# Patient Record
Sex: FEMALE | Race: WHITE | NOT HISPANIC OR LATINO | Employment: OTHER | URBAN - METROPOLITAN AREA
[De-identification: names, ages, dates, MRNs, and addresses within clinical notes are randomized per-mention and may not be internally consistent; named-entity substitution may affect disease eponyms.]

---

## 2017-04-18 ENCOUNTER — OFFICE VISIT (OUTPATIENT)
Dept: URGENT CARE | Facility: CLINIC | Age: 72
End: 2017-04-18
Payer: COMMERCIAL

## 2017-04-18 PROCEDURE — G0463 HOSPITAL OUTPT CLINIC VISIT: HCPCS

## 2017-04-18 PROCEDURE — 99213 OFFICE O/P EST LOW 20 MIN: CPT

## 2019-02-19 LAB — HBA1C MFR BLD HPLC: 5.4 %

## 2019-06-02 DIAGNOSIS — K21.9 GASTROESOPHAGEAL REFLUX DISEASE, ESOPHAGITIS PRESENCE NOT SPECIFIED: Primary | ICD-10-CM

## 2019-06-03 RX ORDER — PANTOPRAZOLE SODIUM 40 MG/1
TABLET, DELAYED RELEASE ORAL
Qty: 90 TABLET | Refills: 1 | Status: SHIPPED | OUTPATIENT
Start: 2019-06-03 | End: 2019-11-24 | Stop reason: SDUPTHER

## 2019-06-27 ENCOUNTER — TELEPHONE (OUTPATIENT)
Dept: GASTROENTEROLOGY | Facility: CLINIC | Age: 74
End: 2019-06-27

## 2019-07-01 ENCOUNTER — OFFICE VISIT (OUTPATIENT)
Dept: GASTROENTEROLOGY | Facility: CLINIC | Age: 74
End: 2019-07-01
Payer: COMMERCIAL

## 2019-07-01 ENCOUNTER — TELEPHONE (OUTPATIENT)
Dept: GASTROENTEROLOGY | Facility: CLINIC | Age: 74
End: 2019-07-01

## 2019-07-01 VITALS
WEIGHT: 132 LBS | BODY MASS INDEX: 24.29 KG/M2 | HEART RATE: 64 BPM | HEIGHT: 62 IN | DIASTOLIC BLOOD PRESSURE: 72 MMHG | SYSTOLIC BLOOD PRESSURE: 140 MMHG

## 2019-07-01 DIAGNOSIS — R10.10 PAIN OF UPPER ABDOMEN: Primary | ICD-10-CM

## 2019-07-01 DIAGNOSIS — Z96.641 HISTORY OF TOTAL RIGHT HIP REPLACEMENT: ICD-10-CM

## 2019-07-01 DIAGNOSIS — K22.70 BARRETT'S ESOPHAGUS WITHOUT DYSPLASIA: ICD-10-CM

## 2019-07-01 DIAGNOSIS — K59.00 CONSTIPATION, UNSPECIFIED CONSTIPATION TYPE: ICD-10-CM

## 2019-07-01 PROBLEM — E78.00 PURE HYPERCHOLESTEROLEMIA: Status: ACTIVE | Noted: 2019-07-01

## 2019-07-01 PROCEDURE — 99214 OFFICE O/P EST MOD 30 MIN: CPT | Performed by: INTERNAL MEDICINE

## 2019-07-01 RX ORDER — PRAVASTATIN SODIUM 40 MG
40 TABLET ORAL DAILY
Refills: 2 | COMMUNITY
Start: 2019-06-06

## 2019-07-01 RX ORDER — FOSINOPRIL SODIUM 10 MG/1
10 TABLET ORAL 2 TIMES DAILY
Refills: 3 | COMMUNITY
Start: 2019-06-08

## 2019-07-01 NOTE — LETTER
July 1, 2019     Mary Elliott MD  [address] [facility]  [address]  [facility] [address]    Patient: Lainey Fairbanks   YOB: 1945   Date of Visit: 7/1/2019       Dear Dr Kaden Jaramillo:    Thank you for referring Cherie Angry to me for evaluation  Below are my notes for this consultation  If you have questions, please do not hesitate to call me  I look forward to following your patient along with you  Sincerely,        Karla Wayne MD        CC: No Recipients  Karla Wayne MD  7/1/2019  6:00 PM  Incomplete  [facility] - Outpatient Follow-up Note  Lainey Fairbanks 74 y o  female MRN: [id number]  Encounter: [id number]    ASSESSMENT AND PLAN:      1  Pain of upper abdomen    --pain seems to be positional   She has noticed it after her surgery on June 20th  On physical exam patient seems to have tenderness over her ribs  I suspect this is related to a musculoskeletal type issue  Patient is not short of breath and does not have increasing pain with deep breathing  No clear relation to eating she is not sure if a bowel movement will help the pain  -pain is worse with sitting  No pain with standing or lying flat  Suspect pain is musculoskeletal in origin    - XR abdomen obstruction series; Future  - Comprehensive metabolic panel; Future  - CBC and differential; Future  -Amylase and Lipase     -if no improvement in 1-2 weeks consider CT scan exam  -pain worsens would obtain CT scan      2  Constipation, unspecified constipation type    Has been a chronic problem  Try Metamucil 1 tbsp daily or MiraLax as needed  Obstruction series should show if there is lot of stool in the colon    3  Edmondson's esophagus without dysplasia    -has been stable  Last endoscopy was 2018 without dysplasia    4   History of total right hip replacement    -patient had surgery June 20th [facility] [place]   -stable at this time  -patient's symptoms probably have something to do with the patient's surgery either change of her position during this procedure, less likely a bowel issue  Followup Appointment:  ISAEL r n   ______________________________________________________________________    Chief Complaint   Patient presents with    Constipation     Abd pain; changes with position; loose stools     HPI:  The patient comes to the office with complaints of sub costal abdominal pain  She has had surgery on June 20th at the [facility]  She had a right total hip replacement  She was discharged the next day from the hospital and did well but does complain of pain under both rib cages  She has this particularly with being in the sitting position  If she is in a standing position or walking she does not have pain  The pain is not aggravated by eating  She did have some problems with constipation as she had been on tramadol for pain  She has been off the tramadol now  She took various laxatives under their direction and had loose stool  She typically does not move her bowels all that frequently and has a bowel movement about twice per week  She does not have particular relief with having a bowel movement although she thought she might  Appetite is good and she has no nausea  Patient did have a colonoscopy as recently as 2017    Historical Information   No past medical history on file  No past surgical history on file  Social History     Substance and Sexual Activity   Alcohol Use Not on file     Social History     Substance and Sexual Activity   Drug Use Not on file     Social History     Tobacco Use   Smoking Status Not on file     Family History   Problem Relation Age of Onset    Colon polyps Sister          Current Outpatient Medications:     fosinopril (MONOPRIL) 10 mg tablet    pantoprazole (PROTONIX) 40 mg tablet    pravastatin (PRAVACHOL) 40 mg tablet  No Known Allergies    10 Point REVIEW OF SYSTEMS   Negative except for under musculoskeletal some hip pain    General/psych difficulty sleeping mostly related to current problem    PHYSICAL EXAM:    Blood pressure 140/72, pulse 64, height 5' 2" (1 575 m), weight 59 9 kg (132 lb)  Body mass index is 24 14 kg/m²  General Appearance:  Alert, cooperative, no distress  HEENT:  Normocephalic, atraumatic, anicteric  Neck: Supple, symmetrical, trachea midline  Lungs: Clear to auscultation bilaterally; no rales, rhonchi or wheezing; respirations unlabored   Heart: Regular rate and rhythm; no murmur, rub, or gallop  Abdomen:   Soft, non-tender, non-distended; normal bowel sounds; no masses, no organomegaly   Musculoskeletal patient does have some tenderness on palpation of both rib cages  Rectal:  Deferred   Extremities:  No cyanosis, clubbing or edema   Skin:  No jaundice, rashes, or lesions   Lymph nodes: No palpable cervical lymphadenopathy         Lebron Dunbar MD  7/1/2019  5:01 PM  Sign at close encounter  [facility] - Outpatient Follow-up Note  Lana Beatty 74 y o  female MRN: [id number]  Encounter: [id number]    ASSESSMENT AND PLAN:      1  Pain of upper abdomen  --pain seems to be positional   She has noticed it after her surgery on June 20th  On physical exam patient seems to have tenderness over her ribs  I suspect this is related to a musculoskeletal type issue  Patient is not short of breath and does not have increasing pain with deep breathing  No clear relation to eating she is not sure if a bowel movement will help the pain  -pain is worse with sitting  No pain with standing or lying flat    - XR abdomen obstruction series; Future  - Comprehensive metabolic panel; Future  - CBC and differential; Future  -Amylase and Lipase     -if no improvement in 1-2 weeks consider CT scan exam  -pain worsens would obtain CT scan      2   Constipation, unspecified constipation type    Has been a chronic problem  Try Metamucil 1 tbsp daily or MiraLax as needed  Obstruction series should show if there is lot of stool in the colon    3  Edmondson's esophagus without dysplasia    -has been stable  Last endoscopy was 2018 without dysplasia    4  History of total right hip replacement    -patient had surgery June [facility]th [facility] [place]   -stable at this time  -patient's symptoms probably have something to do with the patient's surgery either change of her position during this procedure, less likely a bowel issue  Followup Appointment:  ISAEL espinosa n   ______________________________________________________________________    Chief Complaint   Patient presents with    Constipation     Abd pain; changes with position; loose stools     HPI:  The patient comes to the office with complaints of sub costal abdominal pain  She has had surgery on June 20th at the [facility]  She had a right total hip replacement  She was discharged the next day from the hospital and did well but does complain of pain under both rib cages  She has this particularly with being in the sitting position  If she is in a standing position or walking she does not have pain  The pain is not aggravated by eating  She did have some problems with constipation as she had been on tramadol for pain  She has been off the tramadol now  She took various laxatives under their direction and had loose stool  She typically does not move her bowels all that frequently and has a bowel movement about twice per week  She does not have particular relief with having a bowel movement although she thought she might  Appetite is good and she has no nausea  Patient did have a colonoscopy as recently as 2017    Historical Information   No past medical history on file  No past surgical history on file    Social History     Substance and Sexual Activity   Alcohol Use Not on file     Social History     Substance and Sexual Activity   Drug Use Not on file     Social History     Tobacco Use   Smoking Status Not on file     Family History   Problem Relation Age of Onset    Colon polyps Sister          Current Outpatient Medications:     fosinopril (MONOPRIL) 10 mg tablet    pantoprazole (PROTONIX) 40 mg tablet    pravastatin (PRAVACHOL) 40 mg tablet  No Known Allergies    10 Point REVIEW OF SYSTEMS   Negative except for under musculoskeletal some hip pain    General/psych difficulty sleeping mostly related to current problem    PHYSICAL EXAM:    Blood pressure 140/72, pulse 64, height 5' 2" (1 575 m), weight 59 9 kg (132 lb)  Body mass index is 24 14 kg/m²  General Appearance:  Alert, cooperative, no distress  HEENT:  Normocephalic, atraumatic, anicteric  Neck: Supple, symmetrical, trachea midline  Lungs: Clear to auscultation bilaterally; no rales, rhonchi or wheezing; respirations unlabored   Heart: Regular rate and rhythm; no murmur, rub, or gallop    Abdomen:   Soft, non-tender, non-distended; normal bowel sounds; no masses, no organomegaly   Musculoskeletal patient does have some tenderness on palpation of both rib cages  Rectal:  Deferred   Extremities:  No cyanosis, clubbing or edema   Skin:  No jaundice, rashes, or lesions   Lymph nodes: No palpable cervical lymphadenopathy

## 2019-07-01 NOTE — PROGRESS NOTES
[facility] - Outpatient Follow-up Note  Ryan Rodrigez 74 y o  female MRN: [id number]  Encounter: [id number]    ASSESSMENT AND PLAN:      1  Pain of upper abdomen    --pain seems to be positional   She has noticed it after her surgery on June 20th  On physical exam patient seems to have tenderness over her ribs  I suspect this is related to a musculoskeletal type issue  Patient is not short of breath and does not have increasing pain with deep breathing  No clear relation to eating she is not sure if a bowel movement will help the pain  -pain is worse with sitting  No pain with standing or lying flat  Suspect pain is musculoskeletal in origin    - XR abdomen obstruction series; Future  - Comprehensive metabolic panel; Future  - CBC and differential; Future  -Amylase and Lipase     -if no improvement in 1-2 weeks consider CT scan exam  -pain worsens would obtain CT scan      2  Constipation, unspecified constipation type    Has been a chronic problem  Try Metamucil 1 tbsp daily or MiraLax as needed  Obstruction series should show if there is lot of stool in the colon    3  Edmondson's esophagus without dysplasia    -has been stable  Last endoscopy was 2018 without dysplasia    4  History of total right hip replacement    -patient had surgery June 20th [facility] [place]   -stable at this time  -patient's symptoms probably have something to do with the patient's surgery either change of her position during this procedure, less likely a bowel issue  Followup Appointment:  ISAEL ohara   ______________________________________________________________________    Chief Complaint   Patient presents with    Constipation     Abd pain; changes with position; loose stools     HPI:  The patient comes to the office with complaints of sub costal abdominal pain  She has had surgery on June 20th at the [facility]  She had a right total hip replacement    She was discharged the next day from the hospital and did well but does complain of pain under both rib cages  She has this particularly with being in the sitting position  If she is in a standing position or walking she does not have pain  The pain is not aggravated by eating  She did have some problems with constipation as she had been on tramadol for pain  She has been off the tramadol now  She took various laxatives under their direction and had loose stool  She typically does not move her bowels all that frequently and has a bowel movement about twice per week  She does not have particular relief with having a bowel movement although she thought she might  Appetite is good and she has no nausea  Patient did have a colonoscopy as recently as 2017    Historical Information   No past medical history on file  No past surgical history on file  Social History     Substance and Sexual Activity   Alcohol Use Not on file     Social History     Substance and Sexual Activity   Drug Use Not on file     Social History     Tobacco Use   Smoking Status Not on file     Family History   Problem Relation Age of Onset    Colon polyps Sister          Current Outpatient Medications:     fosinopril (MONOPRIL) 10 mg tablet    pantoprazole (PROTONIX) 40 mg tablet    pravastatin (PRAVACHOL) 40 mg tablet  No Known Allergies    10 Point REVIEW OF SYSTEMS   Negative except for under musculoskeletal some hip pain    General/psych difficulty sleeping mostly related to current problem    PHYSICAL EXAM:    Blood pressure 140/72, pulse 64, height 5' 2" (1 575 m), weight 59 9 kg (132 lb)  Body mass index is 24 14 kg/m²  General Appearance:  Alert, cooperative, no distress  HEENT:  Normocephalic, atraumatic, anicteric  Neck: Supple, symmetrical, trachea midline  Lungs: Clear to auscultation bilaterally; no rales, rhonchi or wheezing; respirations unlabored   Heart: Regular rate and rhythm; no murmur, rub, or gallop    Abdomen:   Soft, non-tender, non-distended; normal bowel sounds; no masses, no organomegaly   Musculoskeletal patient does have some tenderness on palpation of both rib cages  Rectal:  Deferred   Extremities:  No cyanosis, clubbing or edema   Skin:  No jaundice, rashes, or lesions   Lymph nodes: No palpable cervical lymphadenopathy

## 2019-07-01 NOTE — PATIENT INSTRUCTIONS
[facility] - Outpatient Follow-up Note  Michelle Liu 74 y o  female MRN: [id number]  Encounter: [id number]    ASSESSMENT AND PLAN:      1  Pain of upper abdomen  --pain seems to be positional   She has noticed it after her surgery on June 20th  On physical exam patient seems to have tenderness over her ribs  I suspect this is related to a musculoskeletal type issue  Patient is not short of breath and does not have increasing pain with deep breathing  No clear relation to eating she is not sure if a bowel movement will help the pain  -pain is worse with sitting  No pain with standing or lying flat    - XR abdomen obstruction series; Future  - Comprehensive metabolic panel; Future  - CBC and differential; Future  -Amylase and Lipase     -if no improvement in 1-2 weeks consider CT scan exam  -pain worsens would obtain CT scan      2  Constipation, unspecified constipation type    Has been a chronic problem  Try Metamucil 1 tbsp daily or MiraLax as needed  Obstruction series should show if there is lot of stool in the colon    3  Edmondson's esophagus without dysplasia    -has been stable  Last endoscopy was 2018 without dysplasia    4  History of total right hip replacement    -patient had surgery June [facility]th [facility] [place]   -stable at this time  -patient's symptoms probably have something to do with the patient's surgery either change of her position during this procedure, less likely a bowel issue  Followup Appointment:  ISAEL ohara

## 2019-07-12 LAB
ALBUMIN SERPL-MCNC: 4.3 G/DL (ref 3.5–4.8)
ALBUMIN/GLOB SERPL: 2 {RATIO} (ref 1.2–2.2)
ALP SERPL-CCNC: 93 IU/L (ref 39–117)
ALT SERPL-CCNC: 11 IU/L (ref 0–32)
AST SERPL-CCNC: 16 IU/L (ref 0–40)
BASOPHILS # BLD AUTO: 0 X10E3/UL (ref 0–0.2)
BASOPHILS NFR BLD AUTO: 0 %
BILIRUB SERPL-MCNC: 0.4 MG/DL (ref 0–1.2)
BUN SERPL-MCNC: 17 MG/DL (ref 8–27)
BUN/CREAT SERPL: 22 (ref 12–28)
CALCIUM SERPL-MCNC: 9.7 MG/DL (ref 8.7–10.3)
CHLORIDE SERPL-SCNC: 101 MMOL/L (ref 96–106)
CO2 SERPL-SCNC: 23 MMOL/L (ref 20–29)
CREAT SERPL-MCNC: 0.76 MG/DL (ref 0.57–1)
EOSINOPHIL # BLD AUTO: 0.2 X10E3/UL (ref 0–0.4)
EOSINOPHIL NFR BLD AUTO: 4 %
ERYTHROCYTE [DISTWIDTH] IN BLOOD BY AUTOMATED COUNT: 15.8 % (ref 12.3–15.4)
GLOBULIN SER-MCNC: 2.2 G/DL (ref 1.5–4.5)
GLUCOSE SERPL-MCNC: 89 MG/DL (ref 65–99)
HCT VFR BLD AUTO: 36.7 % (ref 34–46.6)
HGB BLD-MCNC: 11.7 G/DL (ref 11.1–15.9)
IMM GRANULOCYTES # BLD: 0 X10E3/UL (ref 0–0.1)
IMM GRANULOCYTES NFR BLD: 0 %
LIPASE SERPL-CCNC: 27 U/L (ref 14–85)
LYMPHOCYTES # BLD AUTO: 2.4 X10E3/UL (ref 0.7–3.1)
LYMPHOCYTES NFR BLD AUTO: 39 %
MCH RBC QN AUTO: 27.4 PG (ref 26.6–33)
MCHC RBC AUTO-ENTMCNC: 31.9 G/DL (ref 31.5–35.7)
MCV RBC AUTO: 86 FL (ref 79–97)
MONOCYTES # BLD AUTO: 0.6 X10E3/UL (ref 0.1–0.9)
MONOCYTES NFR BLD AUTO: 11 %
NEUTROPHILS # BLD AUTO: 2.8 X10E3/UL (ref 1.4–7)
NEUTROPHILS NFR BLD AUTO: 46 %
PLATELET # BLD AUTO: 424 X10E3/UL (ref 150–450)
POTASSIUM SERPL-SCNC: 4.7 MMOL/L (ref 3.5–5.2)
PROT SERPL-MCNC: 6.5 G/DL (ref 6–8.5)
RBC # BLD AUTO: 4.27 X10E6/UL (ref 3.77–5.28)
SL AMB EGFR AFRICAN AMERICAN: 89 ML/MIN/1.73
SL AMB EGFR NON AFRICAN AMERICAN: 77 ML/MIN/1.73
SODIUM SERPL-SCNC: 141 MMOL/L (ref 134–144)
WBC # BLD AUTO: 6 X10E3/UL (ref 3.4–10.8)

## 2019-07-13 NOTE — RESULT ENCOUNTER NOTE
Called the patient labs are normal and obstruction series at [facility] is ok - she is doing better at this time  Jey Greene

## 2019-11-24 DIAGNOSIS — K21.9 GASTROESOPHAGEAL REFLUX DISEASE, ESOPHAGITIS PRESENCE NOT SPECIFIED: ICD-10-CM

## 2019-11-25 RX ORDER — PANTOPRAZOLE SODIUM 40 MG/1
TABLET, DELAYED RELEASE ORAL
Qty: 90 TABLET | Refills: 1 | Status: SHIPPED | OUTPATIENT
Start: 2019-11-25 | End: 2020-05-21 | Stop reason: SDUPTHER

## 2020-05-21 DIAGNOSIS — K21.9 GASTROESOPHAGEAL REFLUX DISEASE, ESOPHAGITIS PRESENCE NOT SPECIFIED: ICD-10-CM

## 2020-05-21 RX ORDER — PANTOPRAZOLE SODIUM 40 MG/1
40 TABLET, DELAYED RELEASE ORAL DAILY
Qty: 90 TABLET | Refills: 1 | Status: SHIPPED | OUTPATIENT
Start: 2020-05-21 | End: 2020-11-16

## 2020-08-24 LAB — HBA1C MFR BLD HPLC: 5.4 %

## 2020-11-14 DIAGNOSIS — K21.9 GASTROESOPHAGEAL REFLUX DISEASE: ICD-10-CM

## 2020-11-16 ENCOUNTER — TELEPHONE (OUTPATIENT)
Dept: GASTROENTEROLOGY | Facility: CLINIC | Age: 75
End: 2020-11-16

## 2020-11-16 RX ORDER — PANTOPRAZOLE SODIUM 40 MG/1
TABLET, DELAYED RELEASE ORAL
Qty: 90 TABLET | Refills: 0 | Status: SHIPPED | OUTPATIENT
Start: 2020-11-16 | End: 2020-12-22 | Stop reason: SDUPTHER

## 2020-12-22 ENCOUNTER — OFFICE VISIT (OUTPATIENT)
Dept: GASTROENTEROLOGY | Facility: CLINIC | Age: 75
End: 2020-12-22
Payer: COMMERCIAL

## 2020-12-22 VITALS
BODY MASS INDEX: 24.56 KG/M2 | HEIGHT: 63 IN | WEIGHT: 138.6 LBS | DIASTOLIC BLOOD PRESSURE: 74 MMHG | HEART RATE: 66 BPM | SYSTOLIC BLOOD PRESSURE: 132 MMHG

## 2020-12-22 DIAGNOSIS — K21.00 GASTROESOPHAGEAL REFLUX DISEASE WITH ESOPHAGITIS WITHOUT HEMORRHAGE: Primary | ICD-10-CM

## 2020-12-22 DIAGNOSIS — Z86.010 HISTORY OF COLON POLYPS: ICD-10-CM

## 2020-12-22 DIAGNOSIS — K22.70 BARRETT'S ESOPHAGUS WITHOUT DYSPLASIA: ICD-10-CM

## 2020-12-22 DIAGNOSIS — R10.11 RIGHT UPPER QUADRANT ABDOMINAL PAIN: ICD-10-CM

## 2020-12-22 PROBLEM — Z86.0100 HISTORY OF COLON POLYPS: Status: ACTIVE | Noted: 2020-12-22

## 2020-12-22 PROCEDURE — 99214 OFFICE O/P EST MOD 30 MIN: CPT | Performed by: INTERNAL MEDICINE

## 2020-12-22 RX ORDER — MULTIVITAMIN
1 TABLET ORAL DAILY
COMMUNITY

## 2020-12-22 RX ORDER — PANTOPRAZOLE SODIUM 40 MG/1
40 TABLET, DELAYED RELEASE ORAL DAILY
Qty: 90 TABLET | Refills: 0
Start: 2020-12-22 | End: 2021-02-12

## 2021-01-12 ENCOUNTER — TELEMEDICINE (OUTPATIENT)
Dept: GASTROENTEROLOGY | Facility: CLINIC | Age: 76
End: 2021-01-12

## 2021-01-12 VITALS — BODY MASS INDEX: 23.74 KG/M2 | WEIGHT: 134 LBS | HEIGHT: 63 IN

## 2021-01-12 DIAGNOSIS — K22.70 BARRETT'S ESOPHAGUS WITHOUT DYSPLASIA: Primary | ICD-10-CM

## 2021-01-12 NOTE — PROGRESS NOTES
Why does your doctor want you to have this procedure? Barretts       Do you have kidney disease?  no  If yes, are you on dialysis :     Have you had diverticulitis within the past 2 months? no    Are you diabetic?  no  If yes, insulin dependent: If yes, provide diabetic instructions sheet     Do take iron supplements?  no  If yes, instruct patient to hold iron supplement for 7 days prior    Are you on a blood thinner? no   Was the blood thinner sheet complete and faxed to cardiologist no  Plavix (clopidogrel), Coumadin (warfarin), Lovenox (enoxaparin), Xarelto (rivaroxaban), Pradaxa(dabigatran), Eliquis(apixaban) Savaysa/Lixiana (edoxapan)    Do you have an automatic implantable cardiac defibrillator (AICD)/pacemaker ([facility])? no  Was AICD/pacemaker sheet completed and faxed to cardiologist? no    Are you on home oxygen? no  If yes, continuous or nocturnal:     Have you been treated for MRSA, VRE or any communicable diseases? no    Heart attack, stroke, or stent within 3 months? no  Schedule at Hospital if within 3-6 months   Use nitroglycerin for chest pain in the last 6 months? no    History of organ  transplant?  no   If yes, notify Endo      History of neck/throat/tongue surgery or cancer? no  IF yes, notify Endo      Any problems with anesthesia in the past? no     Was stool C diff ordered?  no Stool specimen needs to be completed prior to procedure    Do have any facial or body piercings?no     Do you have a latex allergy? no     Do have an allergy to metals?  (Bravo study only) no     If pediatric patient, was consent faxed to pediatrician no     Patient rights reviewed yes     EGD instructions were emailed to patient

## 2021-01-19 ENCOUNTER — ANESTHESIA EVENT (OUTPATIENT)
Dept: GASTROENTEROLOGY | Facility: AMBULATORY SURGERY CENTER | Age: 76
End: 2021-01-19

## 2021-01-19 ENCOUNTER — ANESTHESIA (OUTPATIENT)
Dept: GASTROENTEROLOGY | Facility: AMBULATORY SURGERY CENTER | Age: 76
End: 2021-01-19

## 2021-01-19 ENCOUNTER — HOSPITAL ENCOUNTER (OUTPATIENT)
Dept: GASTROENTEROLOGY | Facility: AMBULATORY SURGERY CENTER | Age: 76
Discharge: HOME | End: 2021-01-19
Payer: COMMERCIAL

## 2021-01-19 VITALS
DIASTOLIC BLOOD PRESSURE: 58 MMHG | SYSTOLIC BLOOD PRESSURE: 123 MMHG | HEART RATE: 55 BPM | RESPIRATION RATE: 18 BRPM | OXYGEN SATURATION: 100 % | TEMPERATURE: 97.5 F

## 2021-01-19 VITALS — HEART RATE: 77 BPM

## 2021-01-19 DIAGNOSIS — K21.00 GASTROESOPHAGEAL REFLUX DISEASE WITH ESOPHAGITIS WITHOUT HEMORRHAGE: ICD-10-CM

## 2021-01-19 DIAGNOSIS — R10.11 RIGHT UPPER QUADRANT ABDOMINAL PAIN: ICD-10-CM

## 2021-01-19 DIAGNOSIS — K22.70 BARRETT'S ESOPHAGUS WITHOUT DYSPLASIA: ICD-10-CM

## 2021-01-19 PROCEDURE — 88305 TISSUE EXAM BY PATHOLOGIST: CPT | Performed by: PATHOLOGY

## 2021-01-19 PROCEDURE — 43239 EGD BIOPSY SINGLE/MULTIPLE: CPT | Performed by: INTERNAL MEDICINE

## 2021-01-19 RX ORDER — PROPOFOL 10 MG/ML
INJECTION, EMULSION INTRAVENOUS AS NEEDED
Status: DISCONTINUED | OUTPATIENT
Start: 2021-01-19 | End: 2021-01-19

## 2021-01-19 RX ORDER — LIDOCAINE HYDROCHLORIDE 10 MG/ML
INJECTION, SOLUTION EPIDURAL; INFILTRATION; INTRACAUDAL; PERINEURAL AS NEEDED
Status: DISCONTINUED | OUTPATIENT
Start: 2021-01-19 | End: 2021-01-19

## 2021-01-19 RX ORDER — PHENOL 1.4 %
600 AEROSOL, SPRAY (ML) MUCOUS MEMBRANE 2 TIMES DAILY WITH MEALS
COMMUNITY

## 2021-01-19 RX ORDER — SODIUM CHLORIDE 9 MG/ML
50 INJECTION, SOLUTION INTRAVENOUS CONTINUOUS
Status: DISCONTINUED | OUTPATIENT
Start: 2021-01-19 | End: 2021-01-19

## 2021-01-19 RX ADMIN — PROPOFOL 10 MG: 10 INJECTION, EMULSION INTRAVENOUS at 07:34

## 2021-01-19 RX ADMIN — LIDOCAINE HYDROCHLORIDE 30 MG: 10 INJECTION, SOLUTION EPIDURAL; INFILTRATION; INTRACAUDAL; PERINEURAL at 07:29

## 2021-01-19 RX ADMIN — PROPOFOL 30 MG: 10 INJECTION, EMULSION INTRAVENOUS at 07:30

## 2021-01-19 RX ADMIN — PROPOFOL 30 MG: 10 INJECTION, EMULSION INTRAVENOUS at 07:31

## 2021-01-19 RX ADMIN — PROPOFOL 30 MG: 10 INJECTION, EMULSION INTRAVENOUS at 07:33

## 2021-01-19 RX ADMIN — PROPOFOL 30 MG: 10 INJECTION, EMULSION INTRAVENOUS at 07:32

## 2021-01-19 RX ADMIN — SODIUM CHLORIDE 50 ML/HR: 9 INJECTION, SOLUTION INTRAVENOUS at 07:10

## 2021-01-19 RX ADMIN — PROPOFOL 40 MG: 10 INJECTION, EMULSION INTRAVENOUS at 07:29

## 2021-01-19 NOTE — ANESTHESIA POSTPROCEDURE EVALUATION
Post-Op Assessment Note    CV Status:  Stable  Pain Score: 0    Pain management: adequate     Mental Status:  Sleepy   Hydration Status:  Euvolemic   PONV Controlled:  Controlled   Airway Patency:  Patent      Post Op Vitals Reviewed: Yes      Staff: Anesthesiologist         No complications documented      BP      Temp      Pulse     Resp      SpO2

## 2021-01-19 NOTE — PROGRESS NOTES
Dr Artemio Drew reviewed results and answered questions  Pt given written and verbal d/c instructions

## 2021-01-19 NOTE — H&P
History and Physical - [facility]  Raman Chou 66 y o  female MRN: [id number]                  HPI: Theresa Feliz is a 66y o  year old female who presents for abdominal pain  History of Edmondson's  REVIEW OF SYSTEMS: Per the HPI, and otherwise unremarkable      Historical Information   Past Medical History:   Diagnosis Date    Edmondson esophagus     Bundle-branch block     Cervical spine fracture (HCC)     Colon polyp     GERD (gastroesophageal reflux disease)     Hypertension     Pure hypercholesterolemia 7/1/2019     Past Surgical History:   Procedure Laterality Date    COLONOSCOPY  09/08/2017    5 yr recall    JOINT REPLACEMENT Right     hip    TIBIAL IM QUIRINO REMOVAL      TOTAL HIP ARTHROPLASTY      UPPER GASTROINTESTINAL ENDOSCOPY  10/18/2018    3 yr recall     Social History   Social History     Substance and Sexual Activity   Alcohol Use Not Currently     Social History     Substance and Sexual Activity   Drug Use Never     Social History     Tobacco Use   Smoking Status Never Smoker   Smokeless Tobacco Never Used     Family History   Problem Relation Age of Onset    Colon polyps Sister     Colon cancer Neg Hx        Meds/Allergies     Current Outpatient Medications:     calcium carbonate (OS-NATI) 600 MG tablet    CINNAMON PO    co-enzyme Q-10 30 MG capsule    Cranberry 300 MG tablet    fosinopril (MONOPRIL) 10 mg tablet    Multiple Vitamin (multivitamin) tablet    pantoprazole (PROTONIX) 40 mg tablet    pravastatin (PRAVACHOL) 40 mg tablet    denosumab (PROLIA) 60 mg/mL    Current Facility-Administered Medications:     sodium chloride 0 9 % infusion, 50 mL/hr, Intravenous, Continuous, 50 mL/hr at 01/19/21 0710    No Known Allergies    Objective     /72   Pulse 66   Temp 97 5 °F (36 4 °C) (Temporal)   Resp 15   SpO2 100%       PHYSICAL EXAM    Gen: NAD AAOx3  CV: S1S2 RRR no m/r/g  CHEST: Clear b/l no c/r/w  ABD: +BS soft, NT/ND  EXT: no edema      ASSESSMENT/PLAN:  This is a 66y o  year old female here for EGD, and she is stable and optimized for her procedure

## 2021-01-19 NOTE — ANESTHESIA PREPROCEDURE EVALUATION
Procedure:  EGD    Relevant Problems   ANESTHESIA (within normal limits)      CARDIO   (+) Bundle-branch block   (+) Pure hypercholesterolemia      GI/HEPATIC   (+) Gastroesophageal reflux disease with esophagitis without hemorrhage      NEURO/PSYCH   (+) History of colon polyps      Other   (+) Edmondson's esophagus without dysplasia   (+) Constipation   (+) Right upper quadrant abdominal pain        Physical Exam    Airway    Mallampati score: I  TM Distance: >3 FB  Neck ROM: full     Dental       Cardiovascular  Rhythm: regular, Rate: normal, Cardiovascular exam normal    Pulmonary  Pulmonary exam normal Breath sounds clear to auscultation,     Other Findings        Anesthesia Plan  ASA Score- 2     Anesthesia Type- IV sedation with anesthesia with ASA Monitors  Additional Monitors:   Airway Plan:           Plan Factors-Exercise tolerance (METS): >4 METS  Chart reviewed  Patient summary reviewed  Patient is not a current smoker  Induction- intravenous  Postoperative Plan-     Informed Consent- Anesthetic plan and risks discussed with patient

## 2021-01-19 NOTE — DISCHARGE INSTRUCTIONS
Hiatal Hernia   WHAT YOU NEED TO KNOW:   A hiatal hernia is a condition that causes part of your stomach to bulge through the hiatus (small opening) in your diaphragm  The part of the stomach may move up and down, or it may get trapped above the diaphragm  WHILE YOU ARE HERE:   Informed consent  is a legal document that explains the tests, treatments, or procedures that you may need  Informed consent means you understand what will be done and can make decisions about what you want  You give your permission when you sign the consent form  You can have someone sign this form for you if you are not able to sign it  You have the right to understand your medical care in words you know  Before you sign the consent form, understand the risks and benefits of what will be done  Make sure all your questions are answered  Nutrition:  A dietitian may talk to you about foods you should avoid to manage heartburn  If you continue to have trouble swallowing, a swallowing therapist may teach you a safer way to swallow  The swallowing therapist will also tell you what foods and liquids are safe to eat and drink  An IV  is a small tube placed in your vein that is used to give you medicine or liquids  Medicines:   · Antacids  decrease stomach acid that can irritate your esophagus and stomach  · A histamine type-2 receptor blocker  (H2-blocker) stops acid from being produced in the stomach  · Proton pump inhibitors (PPIs) block acid from being made in the stomach  · Promotility agents  help to tighten the esophageal sphincter  Tests:   · An endoscopy  uses a scope to see the inside of your digestive tract  A scope is a long, bendable tube with a light on the end of it  A camera may be hooked to the scope to take pictures  · An upper GI series test  includes x-rays of your esophagus, stomach, and your small intestines   It is also called a barium swallow test  You will be given barium (a chalky liquid) to drink before the pictures are taken  This liquid helps your stomach and intestines show up better on the x-rays  An upper GI series can show if you have an ulcer, a blocked intestine, or other problems  · Esophageal manometry  measures the pressure within the esophagus and stomach  · Esophageal pH monitoring  measures how much acid is in your stomach  A small probe is placed inside the esophagus and stomach to check the pH of your stomach acid  This test also measures the amount of acid that goes into your esophagus  Treatment:  Surgery may be done when medicines cannot control your symptoms, or other problems are present  Your healthcare provider may also suggest surgery depending on the type of hernia you have  Your healthcare provider can put your stomach back into its normal location  He may make the hiatus (hole) smaller and anchor your stomach in your abdomen  Fundoplication is a surgery that wraps the upper part of the stomach around the esophageal sphincter to strengthen it  RISKS:   Gastroesophageal reflux disease (GERD) caused by a hiatal hernia may lead to ulcers and bleeding in the esophagus  The hiatal hernia may also slide into your chest, get trapped, and not slide back into your abdomen  The tissue from the part of your stomach that is trapped may die if its blood supply is cut off  You may also have sudden severe chest pain and problems swallowing  This may lead to other serious health problems  CARE AGREEMENT:   You have the right to help plan your care  Learn about your health condition and how it may be treated  Discuss treatment options with your healthcare providers to decide what care you want to receive  You always have the right to refuse treatment  © Copyright [address] Information is for End User's use only and may not be sold, redistributed or otherwise used for commercial purposes   All illustrations and images included in CareNotes® are the copyrighted property of [employer]  or [facility]  The above information is an  only  It is not intended as medical advice for individual conditions or treatments  Talk to your doctor, nurse or pharmacist before following any medical regimen to see if it is safe and effective for you  Edmondson Esophagus   WHAT YOU NEED TO KNOW:   What is Edmondson esophagus? Edmondson esophagus is a condition that is also called intestinal metaplasia  Cells that line your esophagus change into cells that are like intestine cells  The change increases your risk for esophageal cancer  What increases my risk for Edmondson esophagus? The exact cause of Edmondson esophagus is not known  The following may increase your risk:  · Long-term gastroesophageal reflux disease (GERD)    · Age older than 48    · A family history of GERD, Edmondson esophagus, or esophageal cancer    · Obesity    · Smoking cigarettes    What are the signs and symptoms of Edmondson esophagus? Signs and symptoms are usually related to the signs and symptoms of GERD  You may have any of the following:  · Heartburn (burning pain in your chest)    · Pain after meals that spreads to your neck, jaw, or shoulder    · Pain that gets better when you change positions    · Bitter or acid taste in your mouth    · A dry cough    · Trouble swallowing or pain with swallowing    · Hoarseness or a sore throat    · Burping or hiccups    · Feeling full soon after you start eating    How is Edmondson esophagus diagnosed? An endoscopy is a procedure used to see the inside of your esophagus and stomach  A scope is a long, bendable tube with a light on the end of it  A camera may be hooked to the scope  Your healthcare provider may also take tissue samples to be tested for dysplasia (abnormal changes in your cells and tissues)  If dysplasia is found, it will be given a grade to describe the amount of change  The grade can range from negative (no dysplasia) to high-grade (a large amount)   You have a higher risk for cancer with high-grade dysplasia  How is Edmondson esophagus treated? Treatment depends on the grade of dysplasia  The goal of treatment is to control your symptoms and prevent esophageal cancer  Your healthcare provider may also suggest that you make changes in the foods you eat and in your lifestyle  You may need any of the following:  · Anti-reflux medicines  help decrease the stomach acid that can irritate your esophagus and stomach  These medicines may include proton pump inhibitors (PPI) and histamine type-2 receptor (H2) blockers  You may also be given medicines to stop vomiting  · Surgery or other procedures  may be done if you have high-grade dysplasia  Abnormal cells may be killed with heat or by freezing them  Light may be used to kill abnormal cells  It is used in combination with medicines that make the abnormal cells sensitive to light  Surgery may be used to wrap the upper part of your stomach around the esophageal sphincter to strengthen it  Surgery may be needed to remove part or all of your esophagus  What can I do to manage Edmondson esophagus? · Do not eat foods that make your symptoms worse  Examples are chocolate, garlic, onions, spicy or fatty foods, citrus fruits (oranges), and tomato-based foods (spaghetti sauce)  Do not drink alcohol, drinks that contain caffeine, or carbonated drinks, such as soda  Ask your healthcare provider if there are other foods and drinks you should not have  · Maintain a healthy weight  If you are overweight, weight loss may help relieve symptoms  Ask your healthcare provider about safe ways to lose weight  · Do not smoke  If you smoke, it is never too late to quit  Smoking may worsen acid reflux  Ask your healthcare provider for information if you need help quitting  Where can I get support and more information?    · [address]  [place]    [place] [address]  Phone: [phone]  Web Address: http://Privy/  org    Call your local emergency number (911 in the [address]) if:   · You have severe chest pain and shortness of breath  When should I seek immediate care? · Your bowel movements are black, bloody, or tarry  · Your vomit looks like coffee grounds or has blood in it  When should I call my doctor? · Your symptoms do not improve with treatment  · You have questions or concerns about your condition or care  CARE AGREEMENT:   You have the right to help plan your care  Learn about your health condition and how it may be treated  Discuss treatment options with your healthcare providers to decide what care you want to receive  You always have the right to refuse treatment  The above information is an  only  It is not intended as medical advice for individual conditions or treatments  Talk to your doctor, nurse or pharmacist before following any medical regimen to see if it is safe and effective for you  © Copyright [address] Information is for End User's use only and may not be sold, redistributed or otherwise used for commercial purposes   All illustrations and images included in CareNotes® are the copyrighted property of A D A M , Inc  or [address] [place] [address] [facility]

## 2021-01-21 DIAGNOSIS — Z23 ENCOUNTER FOR IMMUNIZATION: ICD-10-CM

## 2021-01-30 ENCOUNTER — IMMUNIZATIONS (OUTPATIENT)
Dept: FAMILY MEDICINE CLINIC | Facility: HOSPITAL | Age: 76
End: 2021-01-30

## 2021-01-30 DIAGNOSIS — Z23 ENCOUNTER FOR IMMUNIZATION: Primary | ICD-10-CM

## 2021-01-30 PROCEDURE — 91301 SARS-COV-2 / COVID-19 MRNA VACCINE (MODERNA) 100 MCG: CPT

## 2021-01-30 PROCEDURE — 0011A SARS-COV-2 / COVID-19 MRNA VACCINE (MODERNA) 100 MCG: CPT

## 2021-02-12 DIAGNOSIS — K21.00 GASTROESOPHAGEAL REFLUX DISEASE WITH ESOPHAGITIS WITHOUT HEMORRHAGE: ICD-10-CM

## 2021-02-12 RX ORDER — PANTOPRAZOLE SODIUM 40 MG/1
TABLET, DELAYED RELEASE ORAL
Qty: 90 TABLET | Refills: 3 | Status: SHIPPED | OUTPATIENT
Start: 2021-02-12 | End: 2022-01-18

## 2021-02-26 ENCOUNTER — IMMUNIZATIONS (OUTPATIENT)
Dept: FAMILY MEDICINE CLINIC | Facility: HOSPITAL | Age: 76
End: 2021-02-26

## 2021-02-26 DIAGNOSIS — Z23 ENCOUNTER FOR IMMUNIZATION: Primary | ICD-10-CM

## 2021-02-26 PROCEDURE — 91301 SARS-COV-2 / COVID-19 MRNA VACCINE (MODERNA) 100 MCG: CPT

## 2021-02-26 PROCEDURE — 0012A SARS-COV-2 / COVID-19 MRNA VACCINE (MODERNA) 100 MCG: CPT

## 2021-04-06 ENCOUNTER — OFFICE VISIT (OUTPATIENT)
Dept: URGENT CARE | Facility: CLINIC | Age: 76
End: 2021-04-06
Payer: COMMERCIAL

## 2021-04-06 VITALS
SYSTOLIC BLOOD PRESSURE: 120 MMHG | RESPIRATION RATE: 16 BRPM | TEMPERATURE: 98.4 F | BODY MASS INDEX: 23.66 KG/M2 | HEART RATE: 60 BPM | DIASTOLIC BLOOD PRESSURE: 62 MMHG | HEIGHT: 64 IN | OXYGEN SATURATION: 99 % | WEIGHT: 138.6 LBS

## 2021-04-06 DIAGNOSIS — T22.212A BURN OF LEFT FOREARM, SECOND DEGREE, INITIAL ENCOUNTER: Primary | ICD-10-CM

## 2021-04-06 PROCEDURE — 99213 OFFICE O/P EST LOW 20 MIN: CPT | Performed by: PHYSICIAN ASSISTANT

## 2021-04-06 RX ADMIN — Medication: at 09:07

## 2021-04-06 NOTE — PATIENT INSTRUCTIONS
Second-Degree Burn   AMBULATORY CARE:   A second-degree burn  is also called a partial-thickness burn  A second-degree burn occurs when the first layer and some of the second layer of skin are burned  A superficial second-degree burn usually heals within 2 to 3 weeks with some scarring  A deep second-degree burn can take longer to heal  A second-degree burn can also get worse after a few days and become a third-degree burn  Common signs and symptoms of a second-degree burn:   · A superficial second-degree burn  includes the first layer and some of the second layer  The deeper layers, sweat glands, and oil glands are not damaged  The skin is red, moist, very painful to the touch, and has blisters  Areas of redness turn white when pressure is applied  The area returns to red quickly when the pressure is removed  · A deep second-degree burn  includes damage in the middle layer, and in the sweat glands and oil glands  The skin is mixed red or waxy white, and wet or moist  Some areas of redness may turn white when pressure is applied  The area may return to red slowly or not all when the pressure is removed  Treatment  may include any of the following:  · Medicines  may be used to decrease pain, prevent infection, or help your burn heal  They may be given as a pill or as an ointment applied to your skin  · Surgery  may remove damaged tissue, replace or cover lost skin, or relieve pressure and improve blood flow  Surgery can help prevent infection, decrease inflammation, and improve healing  Surgery can also improve the appearance of your skin and reduce scarring  Burn care:   · Wash your hands with soap and water  Dry your hands with a clean towel or paper towel  · Remove old bandages  You may need to soak the bandage in water before you remove it so it will not stick to your wound  · Gently clean the burned area daily with mild soap and water  Pat the area dry   Look for any swelling or redness around the burn  Do not break closed blisters  You may cause a skin infection  · Apply cream or ointment to the burn with a cotton swab  Place a nonstick bandage over your burn  · Wrap a layer of gauze around the bandage to hold it in place  The wrap should be snug but not tight  It is too tight if you feel tingling or lose feeling in that area  · Apply gentle pressure for a few minutes if bleeding occurs  · Elevate your burned arm or leg above the level of your heart as often as you can  This will help decrease swelling and pain  Prop your burned arm or leg on pillows or blankets to keep it elevated comfortably  Self-care:   · Drink liquids as directed  You may need to drink extra liquid to help prevent dehydration  Ask how much liquid to drink each day and which liquids are best for you  · Go to physical therapy, if directed  Your muscles and joints may not work well after a second-degree burn  A physical therapist teaches you exercises to help improve movement and strength, and to decrease pain  Prevent second-degree burns:   · Do not leave cups, mugs, or bowls containing hot liquids at the edge of a table  Keep pot handles turned away from the stove front  · Do not leave a lit cigarette  Make sure it is no longer lit  Then dispose of it safely  · Store dangerous items out of the reach of children  Store cigarette lighters, matches, and chemicals where children cannot reach them  Use child safety latches on the door of the safe storage area  · Keep your water heater setting to low or medium  (90°F to 120°F, or 32°C to 48°C)  · Wear sunscreen that has a sun protectant factor (SPF) of 15 or higher  The sunscreen should also have ultraviolet A (UVA) and ultraviolet B (UVB) protection  Follow the directions on the label when you use sunscreen  Put on more sunscreen if you are in the sun for more than an hour   Reapply sunscreen often if you go swimming or are sweating  Follow up with your doctor or burn specialist as directed: You may need to return to have your wound checked and your bandage changed  Write down your questions so you remember to ask them during your visits  © Copyright [address] Information is for End User's use only and may not be sold, redistributed or otherwise used for commercial purposes  All illustrations and images included in CareNotes® are the copyrighted property of A D A M , Inc  or [facility] Noreen Esquivel   The above information is an  only  It is not intended as medical advice for individual conditions or treatments  Talk to your doctor, nurse or pharmacist before following any medical regimen to see if it is safe and effective for you

## 2021-04-08 NOTE — PROGRESS NOTES
[email or web address]        NAME: Anna Wayne is a 66 y o  female  : 1945    MRN: [id number]  DATE: 2021  TIME: 5:43 AM    Assessment and Plan   Burn of left forearm, second degree, initial encounter [T22 212A]  1  Burn of left forearm, second degree, initial encounter  silver sulfadiazine (SILVADENE,SSD) 1 % cream         Patient Instructions       Follow up with PCP in 3-5 days  Proceed to  ER if symptoms worsen  Chief Complaint     Chief Complaint   Patient presents with    Arm Pain     onset Friday burned left forearm on oven  Today burn is red, swollen and warm to touch  History of Present Illness         70-year-old female presents the clinic with left forearm burn that occurred Friday  Patient states that she burned the medial aspect of her forearm on an oven rack while cooking  Patient states that she use Neosporin on the area but today noticed that the burn has mild increased redness and swelling with some increased warmth to touch  Patient denies any discharge or drainage or weeping from the area  Patient also denies any blisters  Review of Systems   Review of Systems   Constitutional: Negative for chills, fatigue and fever  Skin: Positive for color change and wound  Negative for rash  Neurological: Negative for dizziness, weakness, light-headedness, numbness and headaches           Current Medications       Current Outpatient Medications:     calcium carbonate (OS-NATI) 600 MG tablet, Take 600 mg by mouth 2 (two) times a day with meals, Disp: , Rfl:     CINNAMON PO, Take by mouth, Disp: , Rfl:     co-enzyme Q-10 30 MG capsule, Take 30 mg by mouth 3 (three) times a day, Disp: , Rfl:     Cranberry 300 MG tablet, Take 300 mg by mouth 2 (two) times a day, Disp: , Rfl:     denosumab (PROLIA) 60 mg/mL, Inject 60 mg under the skin once, Disp: , Rfl:     fosinopril (MONOPRIL) 10 mg tablet, Take 10 mg by mouth 2 (two) times a day , Disp: , Rfl: 3    Multiple Vitamin (multivitamin) tablet, Take 1 tablet by mouth daily, Disp: , Rfl:     pantoprazole (PROTONIX) 40 mg tablet, TAKE 1 TABLET BY MOUTH EVERY DAY, Disp: 90 tablet, Rfl: 3    pravastatin (PRAVACHOL) 40 mg tablet, Take 40 mg by mouth daily, Disp: , Rfl: 2    Current Allergies     Allergies as of 04/06/2021    (No Known Allergies)            The following portions of the patient's history were reviewed and updated as appropriate: allergies, current medications, past family history, past medical history, past social history, past surgical history and problem list      Past Medical History:   Diagnosis Date    Edmondson esophagus     Bundle-branch block     Cervical spine fracture ([address] )     Colon polyp     GERD (gastroesophageal reflux disease)     Hypertension     Pure hypercholesterolemia 7/1/2019       Past Surgical History:   Procedure Laterality Date    COLONOSCOPY  09/08/2017    5 yr recall    JOINT REPLACEMENT Right     hip    TIBIAL IM QUIRINO REMOVAL      TOTAL HIP ARTHROPLASTY      UPPER GASTROINTESTINAL ENDOSCOPY  10/18/2018    3 yr recall       Family History   Problem Relation Age of Onset    Colon polyps Sister     Colon cancer Neg Hx          Medications have been verified  Objective   /62   Pulse 60   Temp 98 4 °F (36 9 °C) (Tympanic)   Resp 16   Ht 5' 3 5" (1 613 m)   Wt 62 9 kg (138 lb 9 6 oz)   SpO2 99%   BMI 24 17 kg/m²   No LMP recorded  Patient is postmenopausal        Physical Exam     Physical Exam  Vitals signs and nursing note reviewed  Constitutional:       General: She is not in acute distress  Appearance: She is well-developed  She is not diaphoretic  HENT:      Head: Normocephalic and atraumatic  Cardiovascular:      Pulses: Normal pulses  Pulmonary:      Effort: Pulmonary effort is normal    Musculoskeletal: Normal range of motion  Left forearm: She exhibits tenderness and swelling   She exhibits no bony tenderness, no edema, no deformity and no laceration  Arms:       Comments:   Approximately 3 cm linear burn noted to the medial aspect of the left forearm with mild erythema localized to area  with localized swelling  No increased warmth noted, no blistering or weeping skin  No streaking redness, pus or drainage noted  Silvadene cream placed over burn, nonadhesive gauze and roll gauze used to cover area  Tube of Silvadene given to patient to continue treatments at home  Skin:     General: Skin is warm and dry  Capillary Refill: Capillary refill takes less than 2 seconds  Neurological:      Mental Status: She is alert and oriented to person, place, and time

## 2021-04-13 ENCOUNTER — OFFICE VISIT (OUTPATIENT)
Dept: URGENT CARE | Facility: CLINIC | Age: 76
End: 2021-04-13
Payer: COMMERCIAL

## 2021-04-13 VITALS
BODY MASS INDEX: 24.34 KG/M2 | DIASTOLIC BLOOD PRESSURE: 78 MMHG | RESPIRATION RATE: 16 BRPM | TEMPERATURE: 97.3 F | SYSTOLIC BLOOD PRESSURE: 132 MMHG | OXYGEN SATURATION: 98 % | HEIGHT: 63 IN | WEIGHT: 137.4 LBS | HEART RATE: 74 BPM

## 2021-04-13 DIAGNOSIS — B02.9 HERPES ZOSTER WITHOUT COMPLICATION: Primary | ICD-10-CM

## 2021-04-13 PROCEDURE — 99213 OFFICE O/P EST LOW 20 MIN: CPT | Performed by: PHYSICIAN ASSISTANT

## 2021-04-13 RX ORDER — VALACYCLOVIR HYDROCHLORIDE 1 G/1
1000 TABLET, FILM COATED ORAL 3 TIMES DAILY
Qty: 21 TABLET | Refills: 0 | Status: SHIPPED | OUTPATIENT
Start: 2021-04-13 | End: 2021-04-20

## 2021-04-13 NOTE — PATIENT INSTRUCTIONS
Shingles   AMBULATORY CARE:   Shingles  is a painful rash  Shingles is caused by the same virus that causes chickenpox (varicella-zoster)  After you get chickenpox, the virus stays in your body for several years without causing any symptoms  Shingles occurs when the virus becomes active again  The active virus travels along a nerve to your skin and causes a rash  Common signs and symptoms include the following:  Shingles often starts with pain in the back, chest, neck, or face  A rash then develops in the same area  The rash is usually found on only one side of the body  The rash may feel itchy or painful  It starts as red dots that become blisters filled with fluid  The blisters usually grow bigger, become filled with pus, and then crust over after a few days  You may also have any of the following:  · Fatigue and muscle weakness    · Pain when your skin is lightly touched    · Headache    · Fever    · Eye pain when exposed to light    Call your local emergency number (911 in the [address] [facility]) if:   · You have trouble moving your arms, legs, or face  · You become confused, or have difficulty speaking  · You have a seizure  Seek care immediately if:   · You have weakness in an arm or leg  · You have dizziness, a severe headache, or hearing or vision loss  · You have painful, red, warm skin around the blisters, or the blisters drain pus  · Your neck is stiff or you have trouble moving it  Call your doctor if:   · You feel weak or have a headache  · You have a cough, chills, or a fever  · You have abdominal pain or nausea, or you are vomiting  · Your rash becomes more itchy or painful  · Your rash spreads to other parts of your body  · Your pain worsens and does not go away even after you take medicine  · You have questions or concerns about your condition or care  Medicines: You may need any of the following:  · Antiviral medicine  helps decrease symptoms and healing time   They may also decrease your risk of developing nerve pain  You will need to start taking them within 3 days of the start of symptoms to prevent nerve pain  · Pain medicine  may be prescribed or suggested by your healthcare provider  You may need NSAIDs, acetaminophen, or opioid medicine depending on how much pain you are in  Do not wait until the pain is severe before you take more pain medicine  · Topical anesthetics  are used to numb the skin and decrease pain  They can be a cream, gel, spray, or patch  · Anticonvulsants  decrease nerve pain and may help you sleep at night  · Antidepressants  may be used to decrease nerve pain  Self-care:  Keep your rash clean and dry  Cover your rash with a bandage or clothing  Do not use bandages that stick to your skin  The sticky part may irritate your skin and make your rash last longer  Prevent the spread of shingles:       · Wash your hands often  Wash your hands several times each day  Wash after you use the bathroom, change a child's diaper, and before you prepare or eat food  Use soap and water every time  Rub your soapy hands together, lacing your fingers  Wash the front and back of your hands, and in between your fingers  Use the fingers of one hand to scrub under the fingernails of the other hand  Wash for at least 20 seconds  Rinse with warm, running water for several seconds  Then dry your hands with a clean towel or paper towel  Use hand  that contains alcohol if soap and water are not available  Do not touch your eyes, nose, or mouth without washing your hands first          · Cover a sneeze or cough  Use a tissue that covers your mouth and nose  Throw the tissue away in a trash can right away  Use the bend of your arm if a tissue is not available  Wash your hands well with soap and water or use a hand   · Stay away from others while you are sick  Avoid crowds as much as possible  · Ask about vaccines you may need    Talk to your healthcare provider about your vaccine history  He or she will tell you which vaccines you need, and when to get them  Prevent shingles or another shingles outbreak:  A vaccine may be given to help prevent shingles  You can get the vaccine even if you already had shingles  The vaccine can help prevent a future outbreak  If you do get shingles again, the vaccine can keep it from becoming severe  The vaccine comes in 2 forms  Your healthcare provider will tell you which form is right for you  The decision is based on your age and any medical conditions you have  A 2-dose vaccine is usually given to adults 48 years or older  A 1-dose vaccine may be given to adults 61 years or older  For more information:   · Centers for Disease Control and Prevention  [address] , [address]  Phone: 0- 570 - [phone]  Phone: 2- 107 - [phone]  Web Address: [employer]    Follow up with your doctor as directed:  Write down your questions so you remember to ask them during your visits  © Copyright [address] Information is for End User's use only and may not be sold, redistributed or otherwise used for commercial purposes  All illustrations and images included in CareNotes® are the copyrighted property of [employer] , Inc  or [facility] Noreen Esquivel   The above information is an  only  It is not intended as medical advice for individual conditions or treatments  Talk to your doctor, nurse or pharmacist before following any medical regimen to see if it is safe and effective for you

## 2021-04-13 NOTE — PROGRESS NOTES
[facility]        NAME: Margaret Stewart is a 66 y o  female  : 1945    MRN: [id number]  DATE: 2021  TIME: 6:31 AM    Assessment and Plan   Herpes zoster without complication [M49 7]  1  Herpes zoster without complication  valACYclovir (VALTREX) 1,000 mg tablet         Patient Instructions       Follow up with PCP in 3-5 days  Proceed to  ER if symptoms worsen  Chief Complaint     Chief Complaint   Patient presents with    Rash     onset 2-3 days- Rash on sternum going in a band to back  Rash is achy and sore         History of Present Illness        57-year-old female presents the clinic with a rash to the left-sided chest wall and sternum that radiates around the left side towards her back  Patient notes a burning sensation as well as achiness and itching to the area and states that her rash started approximately 2-3 days ago      Review of Systems   Review of Systems   Constitutional: Negative for chills, fatigue and fever  Skin: Positive for rash  Negative for color change and wound  Neurological: Negative for dizziness, weakness, light-headedness, numbness and headaches           Current Medications       Current Outpatient Medications:     calcium carbonate (OS-NATI) 600 MG tablet, Take 600 mg by mouth 2 (two) times a day with meals, Disp: , Rfl:     CINNAMON PO, Take by mouth, Disp: , Rfl:     co-enzyme Q-10 30 MG capsule, Take 30 mg by mouth 3 (three) times a day, Disp: , Rfl:     Cranberry 300 MG tablet, Take 300 mg by mouth 2 (two) times a day, Disp: , Rfl:     denosumab (PROLIA) 60 mg/mL, Inject 60 mg under the skin once, Disp: , Rfl:     fosinopril (MONOPRIL) 10 mg tablet, Take 10 mg by mouth 2 (two) times a day , Disp: , Rfl: 3    Multiple Vitamin (multivitamin) tablet, Take 1 tablet by mouth daily, Disp: , Rfl:     pantoprazole (PROTONIX) 40 mg tablet, TAKE 1 TABLET BY MOUTH EVERY DAY, Disp: 90 tablet, Rfl: 3    pravastatin (PRAVACHOL) 40 mg tablet, Take 40 mg by mouth daily, Disp: , Rfl: 2    valACYclovir (VALTREX) 1,000 mg tablet, Take 1 tablet (1,000 mg total) by mouth 3 (three) times a day for 7 days, Disp: 21 tablet, Rfl: 0    Current Allergies     Allergies as of 04/13/2021    (No Known Allergies)            The following portions of the patient's history were reviewed and updated as appropriate: allergies, current medications, past family history, past medical history, past social history, past surgical history and problem list      Past Medical History:   Diagnosis Date    Edmondson esophagus     Bundle-branch block     Cervical spine fracture ([address] )     Colon polyp     GERD (gastroesophageal reflux disease)     Hypertension     Pure hypercholesterolemia 7/1/2019       Past Surgical History:   Procedure Laterality Date    COLONOSCOPY  09/08/2017    5 yr recall    JOINT REPLACEMENT Right     hip    TIBIAL IM QUIRINO REMOVAL      TOTAL HIP ARTHROPLASTY      UPPER GASTROINTESTINAL ENDOSCOPY  10/18/2018    3 yr recall       Family History   Problem Relation Age of Onset    Colon polyps Sister     Colon cancer Neg Hx          Medications have been verified  Objective   /78   Pulse 74   Temp (!) 97 3 °F (36 3 °C) (Tympanic)   Resp 16   Ht 5' 3" (1 6 m)   Wt 62 3 kg (137 lb 6 4 oz)   SpO2 98%   BMI 24 34 kg/m²   No LMP recorded  Patient is postmenopausal        Physical Exam     Physical Exam  Vitals signs and nursing note reviewed  Constitutional:       General: She is not in acute distress  Appearance: She is well-developed  She is not diaphoretic  HENT:      Head: Normocephalic and atraumatic  Pulmonary:      Effort: Pulmonary effort is normal    Musculoskeletal: Normal range of motion  Skin:     General: Skin is warm and dry  Capillary Refill: Capillary refill takes less than 2 seconds  Findings: Erythema and rash present  Rash is vesicular               Comments: Erythematous rash with small vesicle noted to medial aspect of left breast and lateral aspect of left breast/chest wall  Neurological:      Mental Status: She is alert and oriented to person, place, and time

## 2021-06-24 ENCOUNTER — OFFICE VISIT (OUTPATIENT)
Dept: URGENT CARE | Facility: CLINIC | Age: 76
End: 2021-06-24
Payer: COMMERCIAL

## 2021-06-24 VITALS
SYSTOLIC BLOOD PRESSURE: 130 MMHG | OXYGEN SATURATION: 97 % | DIASTOLIC BLOOD PRESSURE: 68 MMHG | HEART RATE: 56 BPM | TEMPERATURE: 97.4 F | RESPIRATION RATE: 16 BRPM

## 2021-06-24 DIAGNOSIS — L25.5 DERMATITIS DUE TO PLANTS, INCLUDING POISON IVY, SUMAC, AND OAK: Primary | ICD-10-CM

## 2021-06-24 PROCEDURE — 99213 OFFICE O/P EST LOW 20 MIN: CPT | Performed by: PHYSICIAN ASSISTANT

## 2021-06-24 RX ORDER — TRIAMCINOLONE ACETONIDE 1 MG/G
CREAM TOPICAL 2 TIMES DAILY
Qty: 30 G | Refills: 0 | Status: SHIPPED | OUTPATIENT
Start: 2021-06-24

## 2021-06-24 NOTE — PATIENT INSTRUCTIONS
Poison Ivy   WHAT YOU NEED TO KNOW:   Poison ivy is a plant that can cause an itchy, uncomfortable rash on your skin  Poison ivy grows as a shrub or vine in woods, fields, and areas of thick [place]  It has 3 bright green leaves on each stem that turn red in saroj  DISCHARGE INSTRUCTIONS:   Medicines:   · Antiseptic or drying creams or ointments: These medicines may be used to dry out the rash and decrease the itching  These products may be available without a doctor's order  · Steroids: This medicine helps decrease itching and inflammation  It can be given as a cream to apply to your skin or as a pill  · Antihistamines: This medicine may help decrease itching and help you sleep  It is available without a doctor's order  · Take your medicine as directed  Contact your healthcare provider if you think your medicine is not helping or if you have side effects  Tell him of her if you are allergic to any medicine  Keep a list of the medicines, vitamins, and herbs you take  Include the amounts, and when and why you take them  Bring the list or the pill bottles to follow-up visits  Carry your medicine list with you in case of an emergency  Follow up with your healthcare provider as directed:  Write down your questions so you remember to ask them during your visits  How your poison ivy rash spreads: You cannot spread poison ivy by touching your rash or the liquid from your blisters  Poison ivy is spread only if you scratch your skin while it still has oil on it  You may think your rash is spreading because new rashes appear over a number of days  This happens because areas covered by thin skin break out in a rash first  Your face or forearms may develop a rash before thicker areas, such as the palms of your hands  Self-care:   · Keep your rash clean and dry:  Wash it with soap and water  Gently pat it dry with a clean towel  · Try not to scratch or rub your rash:   This can cause your skin to become infected  · Use a compress on your rash:  Dip a clean washcloth in cool water  Wring it out and place it on your rash  Leave the washcloth on your skin for 15 minutes  Do this at least 3 times per day  · Take a cornstarch or oatmeal bath: If your rash is too large to cover with wet washcloths, take 3 or 4 cornstarch baths daily  Mix 1 pound of cornstarch with a little water to make a paste  Add the paste to a tub full of water and mix well  You may also use colloidal oatmeal in the bath water  Use lukewarm water  Avoid hot water because it may cause your itching to increase  Prevent a poison ivy rash in the future:   · Wear skin protection:  Wear long pants, a long-sleeved shirt, and gloves  Use a skin block lotion to protect your skin from poison ivy oil  You can find this at a drugstore without a prescription  · Wash clothing after possible exposure: If you think you have been near a poison ivy plant, wash the clothes you were wearing separately from other clothes  Rinse the washing machine well after you take the clothes out  Scrub boots and shoes with warm, soapy water  Dry clean items and clothing that you cannot wash in water  Poison ivy oil is sticky and can stay on surfaces for a long time  It can cause a new rash even years later  · Bathe your pet:  Use warm water and shampoo on your pet's fur  This will prevent the spread of oil to your skin, car, and home  Wear long sleeves, long pants, and gloves while washing pets or any items that may have oil on them  · Reduce exposure to poison ivy:  Do not touch plants that look like poison ivy  Keep your yard free of poison ivy  While protecting your skin, remove the plant and the roots  Place them in a plastic bag and seal the bag tightly  · Do not burn poison ivy plants: This can spread the oil through the air  If you breathe the oil into your lungs, you could have swelling and serious breathing problems   Oil that clings to the fire sin can land on your skin and cause a rash  Contact your healthcare provider if:   · You have pus, soft yellow scabs, or tenderness on the rash  · The itching gets worse or keeps you awake at night  · The rash covers more than 1/4 of your skin or spreads to your eyes, mouth, or genital area  · The rash is not better after 2 to 3 weeks  · You have tender, swollen glands on the sides of your neck  · You have swelling in your arms and legs  · You have questions or concerns about your condition or care  Return to the emergency department if:   · You have a fever  · You have redness, swelling, and tenderness around the rash  · You have trouble breathing  © Copyright [address] Information is for End User's use only and may not be sold, redistributed or otherwise used for commercial purposes  All illustrations and images included in CareNotes® are the copyrighted property of A D A M , Inc  or [facility] Noreen Esquivel   The above information is an  only  It is not intended as medical advice for individual conditions or treatments  Talk to your doctor, nurse or pharmacist before following any medical regimen to see if it is safe and effective for you

## 2021-06-24 NOTE — PROGRESS NOTES
[facility]        NAME: Kenisha Beltran is a 66 y o  female  : 1945    MRN: [id number]  DATE: 2021  TIME: 7:12 AM    Assessment and Plan   Dermatitis due to plants, including poison ivy, sumac, and oak [L25 5]  1  Dermatitis due to plants, including poison ivy, sumac, and oak  triamcinolone (KENALOG) 0 1 % cream         Patient Instructions       Follow up with PCP in 3-5 days  Proceed to  ER if symptoms worsen  Chief Complaint     Chief Complaint   Patient presents with    Rash     began on week ago on pt's left forearm  she reports being outside prior to the rash developing         History of Present Illness         24-year-old female presents the clinic with a rash that started approximately 1 week ago to her left forearm  Patient states that she was outside gardening prior to the start of her rash and states that she is unsure if she came in contact with any poison is plants  Patient notes that the areas are itchy  Review of Systems   Review of Systems   Constitutional: Negative for chills, fatigue and fever  Skin: Positive for rash  Negative for color change and wound  Neurological: Negative for dizziness, weakness, light-headedness, numbness and headaches           Current Medications       Current Outpatient Medications:     calcium carbonate (OS-NATI) 600 MG tablet, Take 600 mg by mouth 2 (two) times a day with meals, Disp: , Rfl:     CINNAMON PO, Take by mouth, Disp: , Rfl:     co-enzyme Q-10 30 MG capsule, Take 30 mg by mouth 3 (three) times a day, Disp: , Rfl:     Cranberry 300 MG tablet, Take 300 mg by mouth 2 (two) times a day, Disp: , Rfl:     denosumab (PROLIA) 60 mg/mL, Inject 60 mg under the skin once, Disp: , Rfl:     fosinopril (MONOPRIL) 10 mg tablet, Take 10 mg by mouth 2 (two) times a day , Disp: , Rfl: 3    Multiple Vitamin (multivitamin) tablet, Take 1 tablet by mouth daily, Disp: , Rfl:     pantoprazole (PROTONIX) 40 mg tablet, TAKE 1 TABLET BY MOUTH EVERY DAY, Disp: 90 tablet, Rfl: 3    pravastatin (PRAVACHOL) 40 mg tablet, Take 40 mg by mouth daily, Disp: , Rfl: 2    triamcinolone (KENALOG) 0 1 % cream, Apply topically 2 (two) times a day, Disp: 30 g, Rfl: 0    valACYclovir (VALTREX) 1,000 mg tablet, Take 1 tablet (1,000 mg total) by mouth 3 (three) times a day for 7 days, Disp: 21 tablet, Rfl: 0    Current Allergies     Allergies as of 06/24/2021    (No Known Allergies)            The following portions of the patient's history were reviewed and updated as appropriate: allergies, current medications, past family history, past medical history, past social history, past surgical history and problem list      Past Medical History:   Diagnosis Date    Edmondson esophagus     Bundle-branch block     Cervical spine fracture ([address] )     Colon polyp     GERD (gastroesophageal reflux disease)     Hypertension     Pure hypercholesterolemia 7/1/2019       Past Surgical History:   Procedure Laterality Date    COLONOSCOPY  09/08/2017    5 yr recall    JOINT REPLACEMENT Right     hip    TIBIAL IM QUIRINO REMOVAL      TOTAL HIP ARTHROPLASTY      UPPER GASTROINTESTINAL ENDOSCOPY  10/18/2018    3 yr recall       Family History   Problem Relation Age of Onset    Colon polyps Sister     Colon cancer Neg Hx          Medications have been verified  Objective   /68   Pulse 56   Temp (!) 97 4 °F (36 3 °C)   Resp 16   SpO2 97%   No LMP recorded  Patient is postmenopausal        Physical Exam     Physical Exam  Vitals and nursing note reviewed  Constitutional:       General: She is not in acute distress  Appearance: She is well-developed  She is not diaphoretic  HENT:      Head: Normocephalic and atraumatic  Pulmonary:      Effort: Pulmonary effort is normal    Musculoskeletal:         General: Normal range of motion  Skin:     General: Skin is warm and dry  Capillary Refill: Capillary refill takes less than 2 seconds        Findings: Erythema and rash present  Rash is vesicular  Comments: Erythematous maculopapular rash with small vesicle noted to left forearm  Area blanchable with  Mild excoriations noted   Neurological:      Mental Status: She is alert and oriented to person, place, and time

## 2021-08-09 ENCOUNTER — OFFICE VISIT (OUTPATIENT)
Dept: URGENT CARE | Facility: CLINIC | Age: 76
End: 2021-08-09
Payer: COMMERCIAL

## 2021-08-09 VITALS
HEIGHT: 63 IN | TEMPERATURE: 96.9 F | BODY MASS INDEX: 24.8 KG/M2 | WEIGHT: 140 LBS | DIASTOLIC BLOOD PRESSURE: 74 MMHG | OXYGEN SATURATION: 99 % | RESPIRATION RATE: 16 BRPM | HEART RATE: 58 BPM | SYSTOLIC BLOOD PRESSURE: 120 MMHG

## 2021-08-09 DIAGNOSIS — T14.8XXA BLISTER: Primary | ICD-10-CM

## 2021-08-09 PROCEDURE — 99213 OFFICE O/P EST LOW 20 MIN: CPT | Performed by: PHYSICIAN ASSISTANT

## 2021-08-09 NOTE — PROGRESS NOTES
[facility]        NAME: Caesar Walls is a 66 y o  female  : 1945    MRN: [id number]  DATE: 2021  TIME: 8:18 AM    Assessment and Plan   Blister [T14  8XXA]  1  Blister           Patient Instructions   There are no Patient Instructions on file for this visit  Follow up with PCP in 3-5 days  Proceed to  ER if symptoms worsen  Chief Complaint     Chief Complaint   Patient presents with    Insect Bite     Last tuesday noticed bug bite right foot between 4/5 digit  Itchy, Tried peroxide and cortisone cream  Blistered thursday-used sterile needle and popped it  History of Present Illness       The pt is a 78-year-old female presenting for a blister on her right foot between the 4th and 5th digit  About a week ago it started off as a bug bite  She tried peroxide and cortisone cream   She has been popping it and has been coming back  No erythema around the blister  No fevers or chills  Review of Systems   Review of Systems   Constitutional: Negative for chills and fever  Skin: Negative for wound          Blister           Current Medications       Current Outpatient Medications:     calcium carbonate (OS-NATI) 600 MG tablet, Take 600 mg by mouth 2 (two) times a day with meals, Disp: , Rfl:     CINNAMON PO, Take by mouth, Disp: , Rfl:     co-enzyme Q-10 30 MG capsule, Take 30 mg by mouth 3 (three) times a day, Disp: , Rfl:     Cranberry 300 MG tablet, Take 300 mg by mouth 2 (two) times a day, Disp: , Rfl:     denosumab (PROLIA) 60 mg/mL, Inject 60 mg under the skin once, Disp: , Rfl:     fosinopril (MONOPRIL) 10 mg tablet, Take 10 mg by mouth 2 (two) times a day , Disp: , Rfl: 3    Multiple Vitamin (multivitamin) tablet, Take 1 tablet by mouth daily, Disp: , Rfl:     pantoprazole (PROTONIX) 40 mg tablet, TAKE 1 TABLET BY MOUTH EVERY DAY, Disp: 90 tablet, Rfl: 3    pravastatin (PRAVACHOL) 40 mg tablet, Take 40 mg by mouth daily, Disp: , Rfl: 2    triamcinolone (KENALOG) 0 1 % cream, Apply topically 2 (two) times a day, Disp: 30 g, Rfl: 0    valACYclovir (VALTREX) 1,000 mg tablet, Take 1 tablet (1,000 mg total) by mouth 3 (three) times a day for 7 days, Disp: 21 tablet, Rfl: 0    Current Allergies     Allergies as of 08/09/2021    (No Known Allergies)            The following portions of the patient's history were reviewed and updated as appropriate: allergies, current medications, past family history, past medical history, past social history, past surgical history and problem list      Past Medical History:   Diagnosis Date    Edmondson esophagus     Bundle-branch block     Cervical spine fracture ([address] )     Colon polyp     GERD (gastroesophageal reflux disease)     Hypertension     Pure hypercholesterolemia 7/1/2019       Past Surgical History:   Procedure Laterality Date    COLONOSCOPY  09/08/2017    5 yr recall    JOINT REPLACEMENT Right     hip    TIBIAL IM QUIRINO REMOVAL      TOTAL HIP ARTHROPLASTY      UPPER GASTROINTESTINAL ENDOSCOPY  10/18/2018    3 yr recall       Family History   Problem Relation Age of Onset    Colon polyps Sister     Colon cancer Neg Hx          Medications have been verified  Objective   /74   Pulse 58   Temp (!) 96 9 °F (36 1 °C)   Resp 16   Ht 5' 3" (1 6 m)   Wt 63 5 kg (140 lb)   SpO2 99%   BMI 24 80 kg/m²        Physical Exam     Physical Exam  Vitals reviewed  Constitutional:       General: She is not in acute distress  Appearance: Normal appearance  She is normal weight  She is not ill-appearing, toxic-appearing or diaphoretic  Musculoskeletal:         General: Normal range of motion  Comments: Small blister of the right foot between the 4th and 5th digit  Appears to be filled with fluid  No surrounding erythema  Skin:     General: Skin is warm  Capillary Refill: Capillary refill takes less than 2 seconds  Neurological:      Mental Status: She is alert

## 2022-01-15 DIAGNOSIS — K21.00 GASTROESOPHAGEAL REFLUX DISEASE WITH ESOPHAGITIS WITHOUT HEMORRHAGE: ICD-10-CM

## 2022-01-18 RX ORDER — PANTOPRAZOLE SODIUM 40 MG/1
TABLET, DELAYED RELEASE ORAL
Qty: 90 TABLET | Refills: 0 | Status: SHIPPED | OUTPATIENT
Start: 2022-01-18 | End: 2022-04-20 | Stop reason: SDUPTHER

## 2022-05-31 DIAGNOSIS — K21.00 GASTROESOPHAGEAL REFLUX DISEASE WITH ESOPHAGITIS WITHOUT HEMORRHAGE: ICD-10-CM

## 2022-05-31 NOTE — TELEPHONE ENCOUNTER
Pt requesting refill of Pantoprazole/will run out before 6/8 OV appt w/ GS; send to [facility]/Trisha  If Geremias Basilio # [id number]

## 2022-06-01 RX ORDER — PANTOPRAZOLE SODIUM 40 MG/1
40 TABLET, DELAYED RELEASE ORAL DAILY
Qty: 60 TABLET | Refills: 0 | Status: SHIPPED | OUTPATIENT
Start: 2022-06-01 | End: 2022-06-08 | Stop reason: SDUPTHER

## 2022-06-08 ENCOUNTER — OFFICE VISIT (OUTPATIENT)
Dept: GASTROENTEROLOGY | Facility: CLINIC | Age: 77
End: 2022-06-08
Payer: COMMERCIAL

## 2022-06-08 VITALS
HEART RATE: 51 BPM | BODY MASS INDEX: 24.27 KG/M2 | SYSTOLIC BLOOD PRESSURE: 132 MMHG | HEIGHT: 63 IN | WEIGHT: 137 LBS | DIASTOLIC BLOOD PRESSURE: 84 MMHG

## 2022-06-08 DIAGNOSIS — K21.00 GASTROESOPHAGEAL REFLUX DISEASE WITH ESOPHAGITIS WITHOUT HEMORRHAGE: Primary | ICD-10-CM

## 2022-06-08 DIAGNOSIS — K22.70 BARRETT'S ESOPHAGUS WITHOUT DYSPLASIA: ICD-10-CM

## 2022-06-08 DIAGNOSIS — Z12.11 SCREENING FOR COLON CANCER: ICD-10-CM

## 2022-06-08 PROCEDURE — 99213 OFFICE O/P EST LOW 20 MIN: CPT | Performed by: NURSE PRACTITIONER

## 2022-06-08 RX ORDER — POLYETHYLENE GLYCOL 3350, SODIUM SULFATE ANHYDROUS, SODIUM BICARBONATE, SODIUM CHLORIDE, POTASSIUM CHLORIDE 236; 22.74; 6.74; 5.86; 2.97 G/4L; G/4L; G/4L; G/4L; G/4L
4000 POWDER, FOR SOLUTION ORAL ONCE
Qty: 4000 ML | Refills: 0 | Status: SHIPPED | OUTPATIENT
Start: 2022-06-08 | End: 2022-06-08

## 2022-06-08 RX ORDER — PANTOPRAZOLE SODIUM 40 MG/1
40 TABLET, DELAYED RELEASE ORAL DAILY
Qty: 30 TABLET | Refills: 11 | Status: SHIPPED | OUTPATIENT
Start: 2022-06-08 | End: 2022-07-19

## 2022-06-08 NOTE — PROGRESS NOTES
[facility] - Outpatient Follow-up Note  Hi Mckinnon 64 y o  female MRN: [id number]  Encounter: [id number]    ASSESSMENT AND PLAN:      1  Gastroesophageal reflux disease with esophagitis without hemorrhage  2  Edmondson's esophagus without dysplasia  Patient presents to office for annual checkup and prescription refill  Patient states she is not having any acid reflux symptoms while taking Protonix 40 mg daily  With last EGD 01/2021, Barretts esophagus observed  Three year recall for surveillance, patient is due 2024      - pantoprazole (PROTONIX) 40 mg tablet; Take 1 tablet (40 mg total) by mouth daily  Dispense: 30 tablet; Refill: 11    3  Screening for colon cancer  Colonoscopy 2017; 5 year recall  - schedule colonoscopy at [facility])  - polyethylene glycol (Golytely) 4000 mL solution; Take 4,000 mL by mouth once for 1 dose Take 4000 mL by mouth once for 1 dose  Use as directed  Dispense: 4000 mL; Refill: 0      Followup Appointment:  1 year  ______________________________________________________________________    Chief Complaint   Patient presents with    Follow up-GERD, needs refills     HPI:  75-year-old female with past medical history of GERD and Barretts esophagus  Patient denies any symptoms at this moment  Her last EGD was 01/19/2021; Barretts esophagus observed above the Z-line, with 2 Edmondson's islands above the Z-line and no associated nodule  She denies nausea, vomiting or abdominal pain  Denies any acid reflux symptoms at this time  States he is having daily normal bowel movements  Denies hematochezia or melena  Patient is due for 5 year recall on colonoscopy from 2017  Nonsmoker, denies ETOH use      Historical Information   Past Medical History:   Diagnosis Date    Edmondson esophagus     Bundle-branch block     Cervical spine fracture (HCC)     Colon polyp     GERD (gastroesophageal reflux disease)     Hypertension     Pure hypercholesterolemia 7/1/2019     Past Surgical History:   Procedure Laterality Date    COLONOSCOPY  09/08/2017    5 yr recall    JOINT REPLACEMENT Right     hip    TIBIAL IM QUIRINO REMOVAL      TOTAL HIP ARTHROPLASTY      UPPER GASTROINTESTINAL ENDOSCOPY  10/18/2018    3 yr recall     Social History     Substance and Sexual Activity   Alcohol Use Not Currently     Social History     Substance and Sexual Activity   Drug Use Never     Social History     Tobacco Use   Smoking Status Never Smoker   Smokeless Tobacco Never Used     Family History   Problem Relation Age of Onset    Colon polyps Sister     Colon cancer Neg Hx          Current Outpatient Medications:     CINNAMON PO    co-enzyme Q-10 30 MG capsule    Cranberry 300 MG tablet    denosumab (PROLIA) 60 mg/mL    fosinopril (MONOPRIL) 10 mg tablet    Multiple Vitamin (multivitamin) tablet    pantoprazole (PROTONIX) 40 mg tablet    polyethylene glycol (Golytely) 4000 mL solution    pravastatin (PRAVACHOL) 40 mg tablet    calcium carbonate (OS-NATI) 600 MG tablet    triamcinolone (KENALOG) 0 1 % cream    valACYclovir (VALTREX) 1,000 mg tablet  No Known Allergies  Reviewed medications and allergies and updated as indicated    PHYSICAL EXAM:    Blood pressure 132/84, pulse (!) 51, height 5' 3" (1 6 m), weight 62 1 kg (137 lb)  Body mass index is 24 27 kg/m²  Normal exam    General Appearance: NAD, cooperative, alert  Eyes: Anicteric, PERRLA, EOMI  ENT:  Normocephalic, atraumatic, normal mucosa  Neck:  Supple, symmetrical, trachea midline  Resp:  Clear to auscultation bilaterally; no rales, rhonchi or wheezing; respirations unlabored   CV:  S1 S2, Regular rate and rhythm; no murmur, rub, or gallop  GI:  Soft, non-tender, non-distended; normal bowel sounds; no masses, no organomegaly   Rectal: Deferred  Musculoskeletal: No cyanosis, clubbing or edema  Normal ROM    Skin:  No jaundice, rashes, or lesions   Heme/Lymph: No palpable cervical lymphadenopathy  Psych: Normal affect, good eye contact  Neuro: No gross deficits, AAOx3    Lab Results:   Lab Results   Component Value Date    WBC 6 0 07/11/2019    HGB 11 7 07/11/2019    HCT 36 7 07/11/2019    MCV 86 07/11/2019     07/11/2019     Lab Results   Component Value Date    K 4 7 07/11/2019     07/11/2019    CO2 23 07/11/2019    BUN 17 07/11/2019    CREATININE 0 76 07/11/2019    AST 16 07/11/2019    ALT 11 07/11/2019     No results found for: IRON, TIBC, FERRITIN  Lab Results   Component Value Date    LIPASE 27 07/11/2019       Radiology Results:   No results found

## 2022-06-20 ENCOUNTER — TELEPHONE (OUTPATIENT)
Dept: GASTROENTEROLOGY | Facility: CLINIC | Age: 77
End: 2022-06-20

## 2022-06-20 NOTE — TELEPHONE ENCOUNTER
1st call-spoke w/pt to sched colon at endo-pt will call back to pick date for September  TK HAS PAPERWORK

## 2022-07-19 DIAGNOSIS — K21.00 GASTROESOPHAGEAL REFLUX DISEASE WITH ESOPHAGITIS WITHOUT HEMORRHAGE: ICD-10-CM

## 2022-07-19 RX ORDER — PANTOPRAZOLE SODIUM 40 MG/1
TABLET, DELAYED RELEASE ORAL
Qty: 90 TABLET | Refills: 4 | Status: SHIPPED | OUTPATIENT
Start: 2022-07-19

## 2022-09-26 ENCOUNTER — TELEPHONE (OUTPATIENT)
Dept: GASTROENTEROLOGY | Facility: CLINIC | Age: 77
End: 2022-09-26

## 2022-09-26 NOTE — TELEPHONE ENCOUNTER
Scheduled date of colonoscopy (as of today):  10/4/2022  Physician performing colonoscopy:  ANETA  Location of colonoscopy:  [facility]  Bowel prep reviewed with patient:  Randall (old instructions)  Instructions reviewed with patient by:  SANJUANITA  Clearances:   N/a    Prescription called to [facility] - left  with rx

## 2022-10-03 ENCOUNTER — HOSPITAL ENCOUNTER (OUTPATIENT)
Dept: GASTROENTEROLOGY | Facility: AMBULATORY SURGERY CENTER | Age: 77
Discharge: HOME | End: 2022-10-03
Attending: INTERNAL MEDICINE
Payer: COMMERCIAL

## 2022-10-03 ENCOUNTER — ANESTHESIA (OUTPATIENT)
Dept: GASTROENTEROLOGY | Facility: AMBULATORY SURGERY CENTER | Age: 77
End: 2022-10-03

## 2022-10-03 ENCOUNTER — ANESTHESIA EVENT (OUTPATIENT)
Dept: GASTROENTEROLOGY | Facility: AMBULATORY SURGERY CENTER | Age: 77
End: 2022-10-03

## 2022-10-03 VITALS
HEART RATE: 56 BPM | HEIGHT: 63 IN | TEMPERATURE: 97.3 F | BODY MASS INDEX: 23.92 KG/M2 | WEIGHT: 135 LBS | SYSTOLIC BLOOD PRESSURE: 140 MMHG | RESPIRATION RATE: 18 BRPM | DIASTOLIC BLOOD PRESSURE: 95 MMHG | OXYGEN SATURATION: 100 %

## 2022-10-03 DIAGNOSIS — Z86.010 HISTORY OF COLON POLYPS: ICD-10-CM

## 2022-10-03 PROCEDURE — 88305 TISSUE EXAM BY PATHOLOGIST: CPT | Performed by: SPECIALIST

## 2022-10-03 PROCEDURE — 45385 COLONOSCOPY W/LESION REMOVAL: CPT | Performed by: INTERNAL MEDICINE

## 2022-10-03 PROCEDURE — 45380 COLONOSCOPY AND BIOPSY: CPT | Performed by: INTERNAL MEDICINE

## 2022-10-03 RX ORDER — SODIUM CHLORIDE, SODIUM LACTATE, POTASSIUM CHLORIDE, CALCIUM CHLORIDE 600; 310; 30; 20 MG/100ML; MG/100ML; MG/100ML; MG/100ML
INJECTION, SOLUTION INTRAVENOUS CONTINUOUS PRN
Status: DISCONTINUED | OUTPATIENT
Start: 2022-10-03 | End: 2022-10-03

## 2022-10-03 RX ORDER — SODIUM CHLORIDE, SODIUM LACTATE, POTASSIUM CHLORIDE, CALCIUM CHLORIDE 600; 310; 30; 20 MG/100ML; MG/100ML; MG/100ML; MG/100ML
75 INJECTION, SOLUTION INTRAVENOUS CONTINUOUS
Status: DISCONTINUED | OUTPATIENT
Start: 2022-10-03 | End: 2022-10-07 | Stop reason: HOSPADM

## 2022-10-03 RX ORDER — PROPOFOL 10 MG/ML
INJECTION, EMULSION INTRAVENOUS AS NEEDED
Status: DISCONTINUED | OUTPATIENT
Start: 2022-10-03 | End: 2022-10-03

## 2022-10-03 RX ORDER — LIDOCAINE HYDROCHLORIDE 10 MG/ML
INJECTION, SOLUTION EPIDURAL; INFILTRATION; INTRACAUDAL; PERINEURAL AS NEEDED
Status: DISCONTINUED | OUTPATIENT
Start: 2022-10-03 | End: 2022-10-03

## 2022-10-03 RX ADMIN — PROPOFOL 100 MG: 10 INJECTION, EMULSION INTRAVENOUS at 08:07

## 2022-10-03 RX ADMIN — PROPOFOL 50 MG: 10 INJECTION, EMULSION INTRAVENOUS at 08:23

## 2022-10-03 RX ADMIN — SODIUM CHLORIDE, SODIUM LACTATE, POTASSIUM CHLORIDE, CALCIUM CHLORIDE: 600; 310; 30; 20 INJECTION, SOLUTION INTRAVENOUS at 08:04

## 2022-10-03 RX ADMIN — PROPOFOL 50 MG: 10 INJECTION, EMULSION INTRAVENOUS at 08:11

## 2022-10-03 RX ADMIN — LIDOCAINE HYDROCHLORIDE 50 MG: 10 INJECTION, SOLUTION EPIDURAL; INFILTRATION; INTRACAUDAL; PERINEURAL at 08:07

## 2022-10-03 RX ADMIN — PROPOFOL 50 MG: 10 INJECTION, EMULSION INTRAVENOUS at 08:16

## 2022-10-03 RX ADMIN — SODIUM CHLORIDE, SODIUM LACTATE, POTASSIUM CHLORIDE, CALCIUM CHLORIDE 75 ML/HR: 600; 310; 30; 20 INJECTION, SOLUTION INTRAVENOUS at 08:02

## 2022-10-03 NOTE — H&P
History and Physical - [id number]    Rama Grazyna Chou 64 y o  female MRN: [id number]      HPI: Alisia Citizen is a 64y o  year old female who presents for personal history of colon polyps  REVIEW OF SYSTEMS: Per the HPI, and otherwise unremarkable      Historical Information     Past Medical History:   Diagnosis Date    Edmondson esophagus     Bundle-branch block     Cervical spine fracture (HCC)     Colon polyp     GERD (gastroesophageal reflux disease)     Hypertension     Pure hypercholesterolemia 7/1/2019     Past Surgical History:   Procedure Laterality Date    COLONOSCOPY  09/08/2017    5 yr recall    JOINT REPLACEMENT Right     hip    TIBIAL IM QUIRINO REMOVAL      TOTAL HIP ARTHROPLASTY      UPPER GASTROINTESTINAL ENDOSCOPY  10/18/2018    3 yr recall     Social History   Social History     Substance and Sexual Activity   Alcohol Use Never     Social History     Substance and Sexual Activity   Drug Use Never     Social History     Tobacco Use   Smoking Status Never Smoker   Smokeless Tobacco Never Used     Family History   Problem Relation Age of Onset    Colon polyps Sister     Colon cancer Neg Hx        Meds/Allergies       Current Outpatient Medications:     co-enzyme Q-10 30 MG capsule    Cranberry 300 MG tablet    fosinopril (MONOPRIL) 10 mg tablet    Multiple Vitamin (multivitamin) tablet    pantoprazole (PROTONIX) 40 mg tablet    pravastatin (PRAVACHOL) 40 mg tablet    calcium carbonate (OS-NATI) 600 MG tablet    CINNAMON PO    denosumab (PROLIA) 60 mg/mL    polyethylene glycol (Golytely) 4000 mL solution    triamcinolone (KENALOG) 0 1 % cream    valACYclovir (VALTREX) 1,000 mg tablet    Current Facility-Administered Medications:     lactated ringers infusion, 75 mL/hr, Intravenous, Continuous    No Known Allergies    Objective     /63   Pulse 78   Temp (!) 97 3 °F (36 3 °C) (Temporal)   Resp 17   Ht 5' 3" (1 6 m)   Wt 61 2 kg (135 lb) SpO2 98%   BMI 23 91 kg/m²       PHYSICAL EXAM    Gen: NAD AAOx3  Head: Normocephalic, Atraumatic  CV: S1S2 RRR no m/r/g  CHEST: Clear b/l no c/r/w  ABD: soft, +BS NT/ND no masses  EXT: no edema      ASSESSMENT/PLAN:  This is a 64y o  year old female here for colonoscopy, and she is stable and optimized for her procedure

## 2022-10-03 NOTE — ANESTHESIA POSTPROCEDURE EVALUATION
Post-Op Assessment Note    CV Status:  Stable  Pain Score: 0    Pain management: adequate     Mental Status:  Alert and awake   Hydration Status:  Euvolemic   PONV Controlled:  Controlled   Airway Patency:  Patent      Post Op Vitals Reviewed: Yes      Staff: Anesthesiologist, CRNA         No complications documented      BP   103/55   Temp     Pulse  54   Resp   18   SpO2   98

## 2022-10-03 NOTE — ANESTHESIA PREPROCEDURE EVALUATION
Procedure:  COLONOSCOPY    Relevant Problems   CARDIO   (+) Bundle-branch block   (+) Pure hypercholesterolemia      GI/HEPATIC   (+) Gastroesophageal reflux disease with esophagitis without hemorrhage      NEURO/PSYCH   (+) History of colon polyps    Denies CP, saw cardiologist 1 month ago, no issues    Physical Exam    Airway    Mallampati score: II  TM Distance: >3 FB  Neck ROM: full     Dental   No notable dental hx     Cardiovascular  Rhythm: irregular,     Pulmonary  Pulmonary exam normal     Other Findings        Anesthesia Plan  ASA Score- 3     Anesthesia Type- IV sedation with anesthesia with ASA Monitors  Additional Monitors:   Airway Plan:           Plan Factors-Exercise tolerance (METS): >4 METS  Chart reviewed  Patient summary reviewed  Patient is not a current smoker  Induction- intravenous  Postoperative Plan-     Informed Consent- Anesthetic plan and risks discussed with patient  I personally reviewed this patient with the CRNA  Discussed and agreed on the Anesthesia Plan with the CRNA  Yancy Day

## 2022-10-28 ENCOUNTER — TELEPHONE (OUTPATIENT)
Dept: GASTROENTEROLOGY | Facility: CLINIC | Age: 77
End: 2022-10-28

## 2022-10-28 NOTE — TELEPHONE ENCOUNTER
Patients GI provider:  Dr ADAME [facility]    Number to return call: [phone]    Reason for call: Pt calling for biopsy results      Scheduled procedure/appointment date if applicable: N/A

## 2023-09-07 DIAGNOSIS — K21.00 GASTROESOPHAGEAL REFLUX DISEASE WITH ESOPHAGITIS WITHOUT HEMORRHAGE: ICD-10-CM

## 2023-09-07 RX ORDER — PANTOPRAZOLE SODIUM 40 MG/1
TABLET, DELAYED RELEASE ORAL
Qty: 90 TABLET | Refills: 0 | Status: SHIPPED | OUTPATIENT
Start: 2023-09-07 | End: 2023-09-07

## 2023-09-07 RX ORDER — PANTOPRAZOLE SODIUM 40 MG/1
TABLET, DELAYED RELEASE ORAL
Qty: 90 TABLET | Refills: 1 | Status: SHIPPED | OUTPATIENT
Start: 2023-09-07

## 2024-01-18 ENCOUNTER — OFFICE VISIT (OUTPATIENT)
Dept: GASTROENTEROLOGY | Facility: CLINIC | Age: 79
End: 2024-01-18
Payer: COMMERCIAL

## 2024-01-18 VITALS
HEIGHT: 62 IN | DIASTOLIC BLOOD PRESSURE: 70 MMHG | BODY MASS INDEX: 23.77 KG/M2 | SYSTOLIC BLOOD PRESSURE: 148 MMHG | WEIGHT: 129.2 LBS

## 2024-01-18 DIAGNOSIS — K22.70 BARRETT'S ESOPHAGUS WITHOUT DYSPLASIA: ICD-10-CM

## 2024-01-18 DIAGNOSIS — K59.04 CHRONIC IDIOPATHIC CONSTIPATION: Primary | ICD-10-CM

## 2024-01-18 PROCEDURE — 99214 OFFICE O/P EST MOD 30 MIN: CPT | Performed by: NURSE PRACTITIONER

## 2024-01-18 NOTE — PATIENT INSTRUCTIONS
IBgard/peppermint oil-take 1 capsule 2 times daily in a.m. and at bedtime but can take up to 4 capsules/day  Take MiraLAX 1 capful daily.  Titrate dosing with goal of soft bowel movement every 1 to 2 days

## 2024-01-18 NOTE — PROGRESS NOTES
[facility] - Outpatient Follow-up Note  Noble Chou 78 y.o. female MRN: [id number]  Encounter: [id number]    ASSESSMENT AND PLAN:      1. Chronic idiopathic constipation  2.  Abdominal bloating  1 to 2-week history of worsening constipation with hard small bowel movement every 3 to 4 days  Patient is experiencing associated abdominal bloating and discomfort, increased flatus  No improvement with stool softeners, probiotics or simethicone  No alarm symptoms  Colonoscopy 2022-adenomatous polyps excised  Abdominal bloating and gas likely related to worsening constipation  -Start MiraLAX 1 capful daily and titrate dose with goal of soft bowel movement every 1 to 2 days  -Trial of peppermint oils/IBgard-recommend starting 1 capsule twice daily and can increase to 4 times per day for abdominal bloating  -Would stop stool softeners and probiotic as ineffective  -Discussed increasing dietary fiber, drinking at least 64 ounces of fluid daily  -Encourage patient to increase activity, daily walking, okay to resume PT for back pain    3. Edmondson's esophagus without dysplasia  Prior EGD January 2021 with irregular Z-line, biopsies positive for intestinal metaplasia consistent with Edmondson's  GERD symptoms currently well-controlled with pantoprazole 40 mg daily  -Plan schedule surveillance EGD at next office visit.  Patient did not want to schedule endoscopy until constipation resolved and back pain improved      Followup Appointment: 2 to 3 months  ______________________________________________________________________    Chief Complaint   Patient presents with    GI Consult     Pt is experiencing bloating and gas after eating for 1-2 weeks. PCP recommended stool softener, discontinued made her sick.     HPI: Patient presents with 2-week history of constipation with associated lower abdominal bloating and discomfort.  Also experiencing increased flatus.   Symptoms worsen with sitting, and with  lying down.  Improves with drawling knees up to abdomen    Normal bowel pattern is formed bowel movement every 2 to 3 days  Now with hard small bowel movements every 3 to 4 days  Denies melena or rectal bleeding    Evaluated by PCP and started on stool softener daily which patient feels has aggravated her symptoms  Was also started on probiotic with no improvement in her symptoms  No improvement with simethicone    Prior colonoscopy 10/2022 -2 adenomatous polyps size, nodular mucosa at cecum, biopsy benign  Prior EGD 1/2021 -irregular Z-line, small hiatal hernia.  Biopsies positive for Edmondson's esophagus.  3-year recall recommended in January 2024    GERD symptoms well-controlled on pantoprazole 40 mg daily  Her appetite is fair-she does eat small quantities of food 2-3 times per day  Does try to eat several servings of fruits daily.  Drinks 64 ounces of water daily    Historical Information   Past Medical History:   Diagnosis Date    Edmondson esophagus     Bundle-branch block     Cervical spine fracture (HCC)     Colon polyp     GERD (gastroesophageal reflux disease)     Hypertension     Pure hypercholesterolemia 7/1/2019     Past Surgical History:   Procedure Laterality Date    COLONOSCOPY  09/08/2017    5 yr recall    JOINT REPLACEMENT Right     hip    TIBIAL IM QUIRINO REMOVAL      TOTAL HIP ARTHROPLASTY      UPPER GASTROINTESTINAL ENDOSCOPY  10/18/2018    3 yr recall     Social History     Substance and Sexual Activity   Alcohol Use Never     Social History     Substance and Sexual Activity   Drug Use Never     Social History     Tobacco Use   Smoking Status Never    Passive exposure: Never   Smokeless Tobacco Never     Family History   Problem Relation Age of Onset    Colon polyps Sister     Colon cancer Neg Hx          Current Outpatient Medications:     co-enzyme Q-10 30 MG capsule    Cranberry 300 MG tablet    denosumab (PROLIA) 60 mg/mL    fosinopril (MONOPRIL) 10 mg tablet    Multiple Vitamin (multivitamin)  "tablet    pantoprazole (PROTONIX) 40 mg tablet    pravastatin (PRAVACHOL) 40 mg tablet    TURMERIC PO  No Known Allergies  Reviewed medications and allergies and updated as indicated    PHYSICAL EXAM:    Blood pressure 148/70, height 5' 1.75\" (1.568 m), weight 58.6 kg (129 lb 3.2 oz). Body mass index is 23.82 kg/m².  General Appearance: NAD, cooperative, alert  Eyes: Anicteric  ENT:  Normocephalic, atraumatic, normal mucosa.    Neck:  Supple, symmetrical, trachea midline  Resp:  Clear to auscultation bilaterally; no rales, rhonchi or wheezing; respirations unlabored   CV:  S1 S2, Regular rate and rhythm; no murmur, rub, or gallop.  GI:  Soft, non-tender, non-distended; normal bowel sounds; no masses, no organomegaly   Rectal: Deferred  Musculoskeletal: No cyanosis, clubbing or edema. Normal ROM.  Skin:  No jaundice, rashes, or lesions   Psych: Normal affect, good eye contact  Neuro: No gross deficits, AAOx3    Lab Results:   Lab Results   Component Value Date    WBC 6.0 07/11/2019    HGB 11.7 07/11/2019    HCT 36.7 07/11/2019    MCV 86 07/11/2019     07/11/2019     Lab Results   Component Value Date    K 4.7 07/11/2019     07/11/2019    CO2 23 07/11/2019    BUN 17 07/11/2019    CREATININE 0.76 07/11/2019    AST 16 07/11/2019    ALT 11 07/11/2019       Lab Results   Component Value Date    LIPASE 27 07/11/2019         "

## 2024-02-24 DIAGNOSIS — K21.00 GASTROESOPHAGEAL REFLUX DISEASE WITH ESOPHAGITIS WITHOUT HEMORRHAGE: ICD-10-CM

## 2024-02-26 RX ORDER — PANTOPRAZOLE SODIUM 40 MG/1
TABLET, DELAYED RELEASE ORAL
Qty: 90 TABLET | Refills: 1 | Status: SHIPPED | OUTPATIENT
Start: 2024-02-26

## 2024-03-20 ENCOUNTER — PREP FOR PROCEDURE (OUTPATIENT)
Age: 79
End: 2024-03-20

## 2024-03-20 ENCOUNTER — TELEPHONE (OUTPATIENT)
Age: 79
End: 2024-03-20

## 2024-03-20 ENCOUNTER — OFFICE VISIT (OUTPATIENT)
Age: 79
End: 2024-03-20
Payer: COMMERCIAL

## 2024-03-20 VITALS
HEIGHT: 62 IN | DIASTOLIC BLOOD PRESSURE: 80 MMHG | BODY MASS INDEX: 24 KG/M2 | SYSTOLIC BLOOD PRESSURE: 154 MMHG | WEIGHT: 130.4 LBS

## 2024-03-20 DIAGNOSIS — K59.09 OTHER CONSTIPATION: ICD-10-CM

## 2024-03-20 DIAGNOSIS — K22.70 BARRETT'S ESOPHAGUS WITHOUT DYSPLASIA: Primary | ICD-10-CM

## 2024-03-20 DIAGNOSIS — K21.00 GASTROESOPHAGEAL REFLUX DISEASE WITH ESOPHAGITIS WITHOUT HEMORRHAGE: ICD-10-CM

## 2024-03-20 PROCEDURE — 99213 OFFICE O/P EST LOW 20 MIN: CPT | Performed by: NURSE PRACTITIONER

## 2024-03-20 NOTE — PROGRESS NOTES
[facility] - Outpatient Follow-up Note  Noble Chou 78 y.o. female MRN: [id number]  Encounter: [id number]    ASSESSMENT AND PLAN:      1. Edmondson's esophagus without dysplasia  2. Gastroesophageal reflux disease with esophagitis without hemorrhage  Prior EGD January 2021 showed irregular Z-line, biopsies positive for Edmondson's esophagus  GERD symptoms currently well-controlled on pantoprazole 40 mg daily  Does report occasional mild epigastric discomfort which occurs with sitting after eating a meal.  Denies liquid reflux  No alarm symptoms  -Scheduled for surveillance EGD at [facility]  -Continue pantoprazole 40 mg daily  -Reviewed diet and lifestyle modification    3.  Constipation  Resolved  Patient currently using MiraLAX 1-2 times per week with soft bowel movement every 1 to 2 days  Abdominal pain and bloating has resolved  No improvement with IBgard-not currently using  -Continue to use MiraLAX as needed to promote soft bowel movement every 1 to 2 days  -Continue dietary fiber and increase fluid  -No recall for screening colonoscopy given patient's age        Followup Appointment: 4 months  ______________________________________________________________________    Chief Complaint   Patient presents with    Follow-up     Pt states her constipation has resolved. Pt notes she occasionally experiences pressure or discomfort in her upper abdomen. No nausea or vomiting.     HPI: Presents in follow-up for history of GERD, Edmondson's esophagus and constipation  Her reflux symptoms are currently well-controlled with omeprazole 40 mg daily.  Denies chest/esophageal discomfort or liquid reflux  She does report occasional epigastric discomfort which she only notices with sitting or driving.  Often occurs after eating a meal  Denies nausea or vomiting  Appetite is good and her weight has been stable    Reports constipation has resolved.  Currently takes MiraLAX 1-2 times per week with  "soft bowel movement every 1 to 2 days.  No further abdominal pain, bloating or gas  Denies melena or rectal bleeding    Historical Information   Past Medical History:   Diagnosis Date    Edmondson esophagus     Bundle-branch block     Cervical spine fracture (HCC)     Colon polyp     GERD (gastroesophageal reflux disease)     Hypertension     Pure hypercholesterolemia 7/1/2019     Past Surgical History:   Procedure Laterality Date    COLONOSCOPY  09/08/2017    5 yr recall    JOINT REPLACEMENT Right     hip    TIBIAL IM QUIRINO REMOVAL      TOTAL HIP ARTHROPLASTY      UPPER GASTROINTESTINAL ENDOSCOPY  10/18/2018    3 yr recall     Social History     Substance and Sexual Activity   Alcohol Use Never     Social History     Substance and Sexual Activity   Drug Use Never     Social History     Tobacco Use   Smoking Status Never    Passive exposure: Never   Smokeless Tobacco Never     Family History   Problem Relation Age of Onset    Colon polyps Sister     Colon cancer Neg Hx          Current Outpatient Medications:     co-enzyme Q-10 30 MG capsule    Cranberry 300 MG tablet    denosumab (PROLIA) 60 mg/mL    fosinopril (MONOPRIL) 10 mg tablet    Multiple Vitamin (multivitamin) tablet    pantoprazole (PROTONIX) 40 mg tablet    pravastatin (PRAVACHOL) 40 mg tablet    TURMERIC PO  No Known Allergies  Reviewed medications and allergies and updated as indicated    PHYSICAL EXAM:    Blood pressure 154/80, height 5' 1.75\" (1.568 m), weight 59.1 kg (130 lb 6.4 oz). Body mass index is 24.04 kg/m².  General Appearance: NAD, cooperative, alert  Eyes: Anicteric  ENT:  Normocephalic, atraumatic, normal mucosa.    Neck:  Supple, symmetrical, trachea midline  Resp:  Clear to auscultation bilaterally; no rales, rhonchi or wheezing; respirations unlabored   CV:  S1 S2, Regular rate and rhythm; no murmur, rub, or gallop.  GI:  Soft, non-tender, non-distended; normal bowel sounds; no masses, no organomegaly   Rectal: Deferred  Musculoskeletal: " No cyanosis, clubbing or edema. Normal ROM.  Skin:  No jaundice, rashes, or lesions   Psych: Normal affect, good eye contact  Neuro: No gross deficits, AAOx3    Lab Results:   Lab Results   Component Value Date    WBC 6.0 07/11/2019    HGB 11.7 07/11/2019    HCT 36.7 07/11/2019    MCV 86 07/11/2019     07/11/2019     Lab Results   Component Value Date    K 4.7 07/11/2019     07/11/2019    CO2 23 07/11/2019    BUN 17 07/11/2019    CREATININE 0.76 07/11/2019    AST 16 07/11/2019    ALT 11 07/11/2019       Lab Results   Component Value Date    LIPASE 27 07/11/2019

## 2024-03-20 NOTE — TELEPHONE ENCOUNTER
Scheduled date of EGD(as of today): 5/20/24  Physician performing EGD: Dr. Joseph  Location of EGD: [facility]  Instructions reviewed with patient by: Megan Cowart  Clearances: none

## 2024-05-06 ENCOUNTER — ANESTHESIA EVENT (OUTPATIENT)
Dept: ANESTHESIOLOGY | Facility: AMBULATORY SURGERY CENTER | Age: 79
End: 2024-05-06

## 2024-05-06 ENCOUNTER — ANESTHESIA (OUTPATIENT)
Dept: ANESTHESIOLOGY | Facility: AMBULATORY SURGERY CENTER | Age: 79
End: 2024-05-06

## 2024-05-09 ENCOUNTER — TELEPHONE (OUTPATIENT)
Dept: GASTROENTEROLOGY | Facility: CLINIC | Age: 79
End: 2024-05-09

## 2024-05-09 NOTE — TELEPHONE ENCOUNTER
Procedure confirmed  Endoscopy     Via: Spoke with patient.      Instructions given: Given to Patient at Visit     Prep Given: N/A    Call the office if there are any questions.

## 2024-05-18 ENCOUNTER — NURSE TRIAGE (OUTPATIENT)
Dept: OTHER | Facility: OTHER | Age: 79
End: 2024-05-18

## 2024-05-18 NOTE — TELEPHONE ENCOUNTER
"Regarding: Cough / Appointment question  ----- Message from Tigist YAN sent at 5/18/2024 12:03 PM EDT -----  Pt stated, \" I have a procedure on Monday but I am sick, I am not sure if I should cancel the appointment or still go because I do not have a fever.\"    "

## 2024-05-18 NOTE — TELEPHONE ENCOUNTER
"Answer Assessment - Initial Assessment Questions  1. REASON FOR CALL or QUESTION: \"What is your reason for calling today?\" or \"How can I best help you?\" or \"What question do you have that I can help answer?\"      Calling in with concerns due to have a \"hacking cough\" since Wednesday and is unsure if her Endoscopy on Monday needs to be cancelled. Denies any fevers or other symptoms at this time.    Protocols used: Information Only Call - No Triage-ADULT-    "

## 2024-05-18 NOTE — TELEPHONE ENCOUNTER
On call provider contacted, stated that as long as the patient is fever free and COVID negative she is okay to still have her procedure done on Monday. Patient made aware and verbalized understanding.  Reason for Disposition  • Question about upcoming scheduled test, no triage required and triager able to answer question    Protocols used: Information Only Call - [facility]

## 2024-05-20 ENCOUNTER — HOSPITAL ENCOUNTER (OUTPATIENT)
Dept: GASTROENTEROLOGY | Facility: AMBULATORY SURGERY CENTER | Age: 79
Discharge: HOME | End: 2024-05-20

## 2024-05-20 DIAGNOSIS — K21.00 GASTROESOPHAGEAL REFLUX DISEASE WITH ESOPHAGITIS WITHOUT HEMORRHAGE: ICD-10-CM

## 2024-05-20 DIAGNOSIS — K22.70 BARRETT'S ESOPHAGUS WITHOUT DYSPLASIA: ICD-10-CM

## 2024-05-20 RX ORDER — SODIUM CHLORIDE, SODIUM LACTATE, POTASSIUM CHLORIDE, CALCIUM CHLORIDE 600; 310; 30; 20 MG/100ML; MG/100ML; MG/100ML; MG/100ML
50 INJECTION, SOLUTION INTRAVENOUS CONTINUOUS
Status: DISCONTINUED | OUTPATIENT
Start: 2024-05-20 | End: 2024-05-24 | Stop reason: HOSPADM

## 2024-05-27 ENCOUNTER — HOSPITAL ENCOUNTER (EMERGENCY)
Facility: HOSPITAL | Age: 79
End: 2024-05-27
Attending: EMERGENCY MEDICINE | Admitting: EMERGENCY MEDICINE
Payer: COMMERCIAL

## 2024-05-27 ENCOUNTER — APPOINTMENT (EMERGENCY)
Dept: CT IMAGING | Facility: HOSPITAL | Age: 79
End: 2024-05-27
Payer: COMMERCIAL

## 2024-05-27 VITALS
OXYGEN SATURATION: 96 % | TEMPERATURE: 98.4 F | HEART RATE: 82 BPM | BODY MASS INDEX: 23.97 KG/M2 | DIASTOLIC BLOOD PRESSURE: 77 MMHG | WEIGHT: 130 LBS | RESPIRATION RATE: 18 BRPM | SYSTOLIC BLOOD PRESSURE: 150 MMHG

## 2024-05-27 DIAGNOSIS — I21.4 NSTEMI (NON-ST ELEVATED MYOCARDIAL INFARCTION) (HCC): ICD-10-CM

## 2024-05-27 DIAGNOSIS — J18.9 PNEUMONIA: Primary | ICD-10-CM

## 2024-05-27 PROBLEM — R93.89 ABNORMAL CT SCAN: Status: ACTIVE | Noted: 2024-05-27

## 2024-05-27 PROBLEM — R79.89 ELEVATED TROPONIN: Status: ACTIVE | Noted: 2024-05-27

## 2024-05-27 PROBLEM — M43.9 COMPRESSION DEFORMITY OF VERTEBRA: Status: ACTIVE | Noted: 2024-05-27

## 2024-05-27 PROBLEM — A41.9 SEPSIS (HCC): Status: ACTIVE | Noted: 2024-05-27

## 2024-05-27 LAB
2HR DELTA HS TROPONIN: 1763 NG/L
4HR DELTA HS TROPONIN: 1802 NG/L
ALBUMIN SERPL BCP-MCNC: 3.8 G/DL (ref 3.5–5)
ALP SERPL-CCNC: 108 U/L (ref 34–104)
ALT SERPL W P-5'-P-CCNC: 23 U/L (ref 7–52)
AMORPH URATE CRY URNS QL MICRO: NORMAL
ANION GAP SERPL CALCULATED.3IONS-SCNC: 10 MMOL/L (ref 4–13)
APTT PPP: 29 SECONDS (ref 23–37)
AST SERPL W P-5'-P-CCNC: 24 U/L (ref 13–39)
BACTERIA UR QL AUTO: NORMAL /HPF
BASOPHILS # BLD AUTO: 0.03 THOUSANDS/ÂΜL (ref 0–0.1)
BASOPHILS NFR BLD AUTO: 0 % (ref 0–1)
BILIRUB SERPL-MCNC: 0.62 MG/DL (ref 0.2–1)
BILIRUB UR QL STRIP: NEGATIVE
BNP SERPL-MCNC: 124 PG/ML (ref 0–100)
BUN SERPL-MCNC: 16 MG/DL (ref 5–25)
CALCIUM SERPL-MCNC: 8.8 MG/DL (ref 8.4–10.2)
CARDIAC TROPONIN I PNL SERPL HS: 2517 NG/L
CARDIAC TROPONIN I PNL SERPL HS: 2556 NG/L
CARDIAC TROPONIN I PNL SERPL HS: 754 NG/L
CHLORIDE SERPL-SCNC: 100 MMOL/L (ref 96–108)
CLARITY UR: CLEAR
CO2 SERPL-SCNC: 23 MMOL/L (ref 21–32)
COLOR UR: YELLOW
CREAT SERPL-MCNC: 0.75 MG/DL (ref 0.6–1.3)
EOSINOPHIL # BLD AUTO: 0.01 THOUSAND/ÂΜL (ref 0–0.61)
EOSINOPHIL NFR BLD AUTO: 0 % (ref 0–6)
ERYTHROCYTE [DISTWIDTH] IN BLOOD BY AUTOMATED COUNT: 13.9 % (ref 11.6–15.1)
GFR SERPL CREATININE-BSD FRML MDRD: 75 ML/MIN/1.73SQ M
GLUCOSE SERPL-MCNC: 114 MG/DL (ref 65–140)
GLUCOSE UR STRIP-MCNC: NEGATIVE MG/DL
HCT VFR BLD AUTO: 38.6 % (ref 34.8–46.1)
HGB BLD-MCNC: 12.1 G/DL (ref 11.5–15.4)
HGB UR QL STRIP.AUTO: ABNORMAL
IMM GRANULOCYTES # BLD AUTO: 0.26 THOUSAND/UL (ref 0–0.2)
IMM GRANULOCYTES NFR BLD AUTO: 1 % (ref 0–2)
INR PPP: 1.29 (ref 0.84–1.19)
KETONES UR STRIP-MCNC: ABNORMAL MG/DL
LACTATE SERPL-SCNC: 1.4 MMOL/L (ref 0.5–2)
LEUKOCYTE ESTERASE UR QL STRIP: NEGATIVE
LIPASE SERPL-CCNC: 14 U/L (ref 11–82)
LYMPHOCYTES # BLD AUTO: 1.65 THOUSANDS/ÂΜL (ref 0.6–4.47)
LYMPHOCYTES NFR BLD AUTO: 9 % (ref 14–44)
MCH RBC QN AUTO: 26.9 PG (ref 26.8–34.3)
MCHC RBC AUTO-ENTMCNC: 31.3 G/DL (ref 31.4–37.4)
MCV RBC AUTO: 86 FL (ref 82–98)
MONOCYTES # BLD AUTO: 1.61 THOUSAND/ÂΜL (ref 0.17–1.22)
MONOCYTES NFR BLD AUTO: 9 % (ref 4–12)
NEUTROPHILS # BLD AUTO: 15.22 THOUSANDS/ÂΜL (ref 1.85–7.62)
NEUTS SEG NFR BLD AUTO: 81 % (ref 43–75)
NITRITE UR QL STRIP: NEGATIVE
NON-SQ EPI CELLS URNS QL MICRO: NORMAL /HPF
NRBC BLD AUTO-RTO: 0 /100 WBCS
PH UR STRIP.AUTO: 6.5 [PH]
PLATELET # BLD AUTO: 415 THOUSANDS/UL (ref 149–390)
PMV BLD AUTO: 9.1 FL (ref 8.9–12.7)
POTASSIUM SERPL-SCNC: 3.7 MMOL/L (ref 3.5–5.3)
PROCALCITONIN SERPL-MCNC: 0.19 NG/ML
PROT SERPL-MCNC: 7.4 G/DL (ref 6.4–8.4)
PROT UR STRIP-MCNC: ABNORMAL MG/DL
PROTHROMBIN TIME: 16.5 SECONDS (ref 11.6–14.5)
RBC # BLD AUTO: 4.49 MILLION/UL (ref 3.81–5.12)
RBC #/AREA URNS AUTO: NORMAL /HPF
SODIUM SERPL-SCNC: 133 MMOL/L (ref 135–147)
SP GR UR STRIP.AUTO: <1.005 (ref 1–1.03)
UROBILINOGEN UR STRIP-ACNC: <2 MG/DL
WBC # BLD AUTO: 18.78 THOUSAND/UL (ref 4.31–10.16)
WBC #/AREA URNS AUTO: NORMAL /HPF

## 2024-05-27 PROCEDURE — 96365 THER/PROPH/DIAG IV INF INIT: CPT

## 2024-05-27 PROCEDURE — 96375 TX/PRO/DX INJ NEW DRUG ADDON: CPT

## 2024-05-27 PROCEDURE — 74177 CT ABD & PELVIS W/CONTRAST: CPT

## 2024-05-27 PROCEDURE — 81001 URINALYSIS AUTO W/SCOPE: CPT | Performed by: EMERGENCY MEDICINE

## 2024-05-27 PROCEDURE — 85025 COMPLETE CBC W/AUTO DIFF WBC: CPT | Performed by: EMERGENCY MEDICINE

## 2024-05-27 PROCEDURE — 84484 ASSAY OF TROPONIN QUANT: CPT | Performed by: EMERGENCY MEDICINE

## 2024-05-27 PROCEDURE — 87040 BLOOD CULTURE FOR BACTERIA: CPT | Performed by: EMERGENCY MEDICINE

## 2024-05-27 PROCEDURE — 96368 THER/DIAG CONCURRENT INF: CPT

## 2024-05-27 PROCEDURE — 84145 PROCALCITONIN (PCT): CPT | Performed by: EMERGENCY MEDICINE

## 2024-05-27 PROCEDURE — 85610 PROTHROMBIN TIME: CPT | Performed by: EMERGENCY MEDICINE

## 2024-05-27 PROCEDURE — 83605 ASSAY OF LACTIC ACID: CPT | Performed by: EMERGENCY MEDICINE

## 2024-05-27 PROCEDURE — 96366 THER/PROPH/DIAG IV INF ADDON: CPT

## 2024-05-27 PROCEDURE — 83690 ASSAY OF LIPASE: CPT | Performed by: EMERGENCY MEDICINE

## 2024-05-27 PROCEDURE — 93005 ELECTROCARDIOGRAM TRACING: CPT

## 2024-05-27 PROCEDURE — 85730 THROMBOPLASTIN TIME PARTIAL: CPT | Performed by: EMERGENCY MEDICINE

## 2024-05-27 PROCEDURE — 99285 EMERGENCY DEPT VISIT HI MDM: CPT

## 2024-05-27 PROCEDURE — 99215 OFFICE O/P EST HI 40 MIN: CPT | Performed by: NURSE PRACTITIONER

## 2024-05-27 PROCEDURE — 80053 COMPREHEN METABOLIC PANEL: CPT | Performed by: EMERGENCY MEDICINE

## 2024-05-27 PROCEDURE — 36415 COLL VENOUS BLD VENIPUNCTURE: CPT | Performed by: EMERGENCY MEDICINE

## 2024-05-27 PROCEDURE — 71275 CT ANGIOGRAPHY CHEST: CPT

## 2024-05-27 PROCEDURE — 83880 ASSAY OF NATRIURETIC PEPTIDE: CPT | Performed by: EMERGENCY MEDICINE

## 2024-05-27 PROCEDURE — 99291 CRITICAL CARE FIRST HOUR: CPT | Performed by: EMERGENCY MEDICINE

## 2024-05-27 PROCEDURE — 99222 1ST HOSP IP/OBS MODERATE 55: CPT | Performed by: NURSE PRACTITIONER

## 2024-05-27 RX ORDER — CEFEPIME HYDROCHLORIDE 2 G/50ML
2000 INJECTION, SOLUTION INTRAVENOUS ONCE
Status: COMPLETED | OUTPATIENT
Start: 2024-05-27 | End: 2024-05-27

## 2024-05-27 RX ORDER — HEPARIN SODIUM 1000 [USP'U]/ML
1650 INJECTION, SOLUTION INTRAVENOUS; SUBCUTANEOUS EVERY 6 HOURS PRN
Status: DISCONTINUED | OUTPATIENT
Start: 2024-05-27 | End: 2024-05-28 | Stop reason: HOSPADM

## 2024-05-27 RX ORDER — HEPARIN SODIUM 10000 [USP'U]/100ML
3-20 INJECTION, SOLUTION INTRAVENOUS
Status: DISCONTINUED | OUTPATIENT
Start: 2024-05-27 | End: 2024-05-28 | Stop reason: HOSPADM

## 2024-05-27 RX ORDER — ACETAMINOPHEN 325 MG/1
975 TABLET ORAL ONCE
Status: COMPLETED | OUTPATIENT
Start: 2024-05-27 | End: 2024-05-27

## 2024-05-27 RX ORDER — HEPARIN SODIUM 1000 [USP'U]/ML
3300 INJECTION, SOLUTION INTRAVENOUS; SUBCUTANEOUS EVERY 6 HOURS PRN
Status: DISCONTINUED | OUTPATIENT
Start: 2024-05-27 | End: 2024-05-28 | Stop reason: HOSPADM

## 2024-05-27 RX ORDER — HEPARIN SODIUM 1000 [USP'U]/ML
3300 INJECTION, SOLUTION INTRAVENOUS; SUBCUTANEOUS ONCE
Status: COMPLETED | OUTPATIENT
Start: 2024-05-27 | End: 2024-05-27

## 2024-05-27 RX ORDER — ASPIRIN 81 MG/1
324 TABLET, CHEWABLE ORAL ONCE
Status: COMPLETED | OUTPATIENT
Start: 2024-05-27 | End: 2024-05-27

## 2024-05-27 RX ORDER — ALBUTEROL SULFATE 2.5 MG/3ML
5 SOLUTION RESPIRATORY (INHALATION) ONCE
Status: COMPLETED | OUTPATIENT
Start: 2024-05-27 | End: 2024-05-27

## 2024-05-27 RX ORDER — BENZONATATE 100 MG/1
100 CAPSULE ORAL ONCE
Status: COMPLETED | OUTPATIENT
Start: 2024-05-27 | End: 2024-05-27

## 2024-05-27 RX ADMIN — TICAGRELOR 180 MG: 90 TABLET ORAL at 20:22

## 2024-05-27 RX ADMIN — BENZONATATE 100 MG: 100 CAPSULE ORAL at 16:45

## 2024-05-27 RX ADMIN — ALBUTEROL SULFATE 5 MG: 2.5 SOLUTION RESPIRATORY (INHALATION) at 16:45

## 2024-05-27 RX ADMIN — IOHEXOL 85 ML: 350 INJECTION, SOLUTION INTRAVENOUS at 18:52

## 2024-05-27 RX ADMIN — CEFEPIME HYDROCHLORIDE 2000 MG: 2 INJECTION, SOLUTION INTRAVENOUS at 19:51

## 2024-05-27 RX ADMIN — ASPIRIN 81 MG CHEWABLE TABLET 324 MG: 81 TABLET CHEWABLE at 17:39

## 2024-05-27 RX ADMIN — IPRATROPIUM BROMIDE 0.5 MG: 0.5 SOLUTION RESPIRATORY (INHALATION) at 16:45

## 2024-05-27 RX ADMIN — HEPARIN SODIUM 12 UNITS/KG/HR: 10000 INJECTION, SOLUTION INTRAVENOUS at 17:37

## 2024-05-27 RX ADMIN — HEPARIN SODIUM 3300 UNITS: 1000 INJECTION INTRAVENOUS; SUBCUTANEOUS at 17:37

## 2024-05-27 RX ADMIN — ACETAMINOPHEN 975 MG: 325 TABLET, FILM COATED ORAL at 22:28

## 2024-05-27 NOTE — ED PROVIDER NOTES
History  Chief Complaint   Patient presents with    Abdominal Pain     Pt to ED c/o abd pain. States she just finished a steroid and zpak for pneumonia and has been having abd pain since.      78-year-old female presents for evaluation of persistent cough that started a few weeks ago in addition to abdominal pain.  Patient reports she just completed a course of steroids and Z-Parth prescribed at urgent care for pneumonia however continues to cough which causes her to have abdominal pain.  Denies chest pain, shortness of breath, fever.  Patient was scheduled for EGD last week but that got canceled given her current symptoms.        Prior to Admission Medications   Prescriptions Last Dose Informant Patient Reported? Taking?   Cranberry 300 MG tablet  Self Yes No   Sig: Take 300 mg by mouth 2 (two) times a day   Multiple Vitamin (multivitamin) tablet  Self Yes No   Sig: Take 1 tablet by mouth daily   TURMERIC PO  Self Yes No   Sig: Take by mouth   co-enzyme Q-10 30 MG capsule  Self Yes No   Sig: Take 30 mg by mouth 3 (three) times a day   denosumab (PROLIA) 60 mg/mL  Self Yes No   Sig: Inject 60 mg under the skin once   fosinopril (MONOPRIL) 10 mg tablet  Self Yes No   Sig: Take 10 mg by mouth 2 (two) times a day    pantoprazole (PROTONIX) 40 mg tablet  Self No No   Sig: TAKE 1 TABLET BY MOUTH EVERY DAY   pravastatin (PRAVACHOL) 40 mg tablet  Self Yes No   Sig: Take 40 mg by mouth daily      Facility-Administered Medications: None       Past Medical History:   Diagnosis Date    Edmondson esophagus     Bundle-branch block     Cervical spine fracture (HCC)     Colon polyp     GERD (gastroesophageal reflux disease)     Hypertension     Post-nasal drip     causing cough    Pure hypercholesterolemia 07/01/2019       Past Surgical History:   Procedure Laterality Date    COLONOSCOPY  09/08/2017    5 yr recall    JOINT REPLACEMENT Right     hip    TIBIAL IM QUIRINO REMOVAL      TOTAL HIP ARTHROPLASTY      UPPER GASTROINTESTINAL  ENDOSCOPY  10/18/2018    3 yr recall       Family History   Problem Relation Age of Onset    Colon polyps Sister     Colon cancer Neg Hx      I have reviewed and agree with the history as documented.    E-Cigarette/Vaping    E-Cigarette Use Never User      E-Cigarette/Vaping Substances     Social History     Tobacco Use    Smoking status: Never     Passive exposure: Never    Smokeless tobacco: Never   Vaping Use    Vaping status: Never Used   Substance Use Topics    Alcohol use: Never    Drug use: Never       Review of Systems   Respiratory:  Positive for cough.        Physical Exam  Physical Exam  Vitals and nursing note reviewed.   Constitutional:       Appearance: She is well-developed.   HENT:      Head: Normocephalic and atraumatic.      Right Ear: External ear normal.      Left Ear: External ear normal.      Nose: Nose normal.   Eyes:      General: No scleral icterus.  Cardiovascular:      Rate and Rhythm: Normal rate.   Pulmonary:      Effort: Pulmonary effort is normal. No respiratory distress.      Breath sounds: Wheezing present.      Comments: Mild scattered wheezing  Abdominal:      General: There is no distension.      Tenderness: There is abdominal tenderness in the periumbilical area.   Musculoskeletal:         General: No deformity. Normal range of motion.      Cervical back: Normal range of motion.   Skin:     Findings: No rash.   Neurological:      General: No focal deficit present.      Mental Status: She is alert and oriented to person, place, and time.   Psychiatric:         Mood and Affect: Mood normal.         Vital Signs  ED Triage Vitals [05/27/24 1621]   Temperature Pulse Respirations Blood Pressure SpO2   98.4 °F (36.9 °C) 99 18 157/91 99 %      Temp Source Heart Rate Source Patient Position - Orthostatic VS BP Location FiO2 (%)   Temporal Monitor Sitting Left arm --      Pain Score       3           Vitals:    05/27/24 1700 05/27/24 1730 05/27/24 1800 05/27/24 1900   BP: 164/70 140/69  138/88 146/71   Pulse: 101 97 98 95   Patient Position - Orthostatic VS: Sitting Sitting Sitting Sitting         Visual Acuity  Visual Acuity      Flowsheet Row Most Recent Value   L Pupil Size (mm) 3   R Pupil Size (mm) 3            ED Medications  Medications   heparin (porcine) 25,000 units in 0.45% NaCl 250 mL infusion (premix) (12 Units/kg/hr × 55 kg (Order-Specific) Intravenous New Bag 5/27/24 1737)   heparin (porcine) injection 3,300 Units (has no administration in time range)   heparin (porcine) injection 1,650 Units (has no administration in time range)   cefepime (MAXIPIME) IVPB (premix in dextrose) 2,000 mg 50 mL (2,000 mg Intravenous New Bag 5/27/24 1951)   ticagrelor (BRILINTA) tablet 180 mg (has no administration in time range)   albuterol inhalation solution 5 mg (5 mg Nebulization Given 5/27/24 1645)   ipratropium (ATROVENT) 0.02 % inhalation solution 0.5 mg (0.5 mg Nebulization Given 5/27/24 1645)   benzonatate (TESSALON PERLES) capsule 100 mg (100 mg Oral Given 5/27/24 1645)   aspirin chewable tablet 324 mg (324 mg Oral Given 5/27/24 1739)   heparin (porcine) injection 3,300 Units (3,300 Units Intravenous Given 5/27/24 1737)   iohexol (OMNIPAQUE) 350 MG/ML injection (SINGLE-DOSE) 85 mL (85 mL Intravenous Given 5/27/24 1852)       Diagnostic Studies  Results Reviewed       Procedure Component Value Units Date/Time    APTT six (6) hours after Heparin bolus/drip initiation or dosing change [[id number]]     Lab Status: No result Specimen: Blood     HS Troponin I 2hr [[id number]]  (Abnormal) Collected: 05/27/24 1856    Lab Status: Final result Specimen: Blood from Arm, Right Updated: 05/27/24 1922     hs TnI 2hr 2,517 ng/L      Delta 2hr hsTnI 1,763 ng/L     B-Type Natriuretic Peptide(BNP) [[id number]]  (Abnormal) Collected: 05/27/24 1642    Lab Status: Final result Specimen: Blood from Arm, Right Updated: 05/27/24 1853      pg/mL     HS Troponin I 4hr [[id number]]     Lab Status: No result Specimen:  Blood     Procalcitonin [[id number]]  (Normal) Collected: 05/27/24 1642    Lab Status: Final result Specimen: Blood from Arm, Right Updated: 05/27/24 1716     Procalcitonin 0.19 ng/ml     HS Troponin 0hr (reflex protocol) [[id number]]  (Abnormal) Collected: 05/27/24 1642    Lab Status: Final result Specimen: Blood from Arm, Right Updated: 05/27/24 1713     hs TnI 0hr 754 ng/L     Protime-INR [[id number]]  (Abnormal) Collected: 05/27/24 1642    Lab Status: Final result Specimen: Blood from Arm, Right Updated: 05/27/24 1712     Protime 16.5 seconds      INR 1.29    APTT [[id number]]  (Normal) Collected: 05/27/24 1642    Lab Status: Final result Specimen: Blood from Arm, Right Updated: 05/27/24 1712     PTT 29 seconds     CMP [[id number]]  (Abnormal) Collected: 05/27/24 1642    Lab Status: Final result Specimen: Blood from Arm, Right Updated: 05/27/24 1709     Sodium 133 mmol/L      Potassium 3.7 mmol/L      Chloride 100 mmol/L      CO2 23 mmol/L      ANION GAP 10 mmol/L      BUN 16 mg/dL      Creatinine 0.75 mg/dL      Glucose 114 mg/dL      Calcium 8.8 mg/dL      AST 24 U/L      ALT 23 U/L      Alkaline Phosphatase 108 U/L      Total Protein 7.4 g/dL      Albumin 3.8 g/dL      Total Bilirubin 0.62 mg/dL      eGFR 75 ml/min/1.73sq m     Narrative:      National Kid[employer] Foundation guidelines for Chronic Kidney Disease (CKD):     Stage 1 with normal or high GFR (GFR > 90 mL/min/1.73 square meters)    Stage 2 Mild CKD (GFR = 60-89 mL/min/1.73 square meters)    Stage 3A Moderate CKD (GFR = 45-59 mL/min/1.73 square meters)    Stage 3B Moderate CKD (GFR = 30-44 mL/min/1.73 square meters)    Stage 4 Severe CKD (GFR = 15-29 mL/min/1.73 square meters)    Stage 5 End Stage CKD (GFR <15 mL/min/1.73 square meters)  Note: GFR calculation is accurate only with a steady state creatinine    Lipase [[id number]]  (Normal) Collected: 05/27/24 1642    Lab Status: Final result Specimen: Blood from Arm, Right Updated: 05/27/24 8938      Lipase 14 u/L     Lactic acid [[id number]]  (Normal) Collected: 05/27/24 1642    Lab Status: Final result Specimen: Blood from Arm, Right Updated: 05/27/24 1708     LACTIC ACID 1.4 mmol/L     Narrative:      Result may be elevated if tourniquet was used during collection.    CBC and differential [[id number]]  (Abnormal) Collected: 05/27/24 1642    Lab Status: Final result Specimen: Blood from Arm, Right Updated: 05/27/24 1651     WBC 18.78 Thousand/uL      RBC 4.49 Million/uL      Hemoglobin 12.1 g/dL      Hematocrit 38.6 %      MCV 86 fL      MCH 26.9 pg      MCHC 31.3 g/dL      RDW 13.9 %      MPV 9.1 fL      Platelets 415 Thousands/uL      nRBC 0 /100 WBCs      Segmented % 81 %      Immature Grans % 1 %      Lymphocytes % 9 %      Monocytes % 9 %      Eosinophils Relative 0 %      Basophils Relative 0 %      Absolute Neutrophils 15.22 Thousands/µL      Absolute Immature Grans 0.26 Thousand/uL      Absolute Lymphocytes 1.65 Thousands/µL      Absolute Monocytes 1.61 Thousand/µL      Eosinophils Absolute 0.01 Thousand/µL      Basophils Absolute 0.03 Thousands/µL     Blood culture #1 [[id number]] Collected: 05/27/24 1642    Lab Status: In process Specimen: Blood from Arm, Right Updated: 05/27/24 1649    Blood culture #2 [[id number]] Collected: 05/27/24 1642    Lab Status: In process Specimen: Blood from Arm, Right Updated: 05/27/24 1649    UA w Reflex to Microscopic w Reflex to Culture [[id number]]     Lab Status: No result Specimen: Urine                    PE Study with CT abdomen & pelvis with contrast   Final Result by Oniel Charles MD (05/27 1938)      1.  No pulmonary embolism.   2.  Large right lower lobe consolidation and patchy left-sided opacities with endoluminal debris, consistent with pneumonia.   3.  Retained debris in the esophagus. Given the lung opacities and endoluminal debris, aspiration should also be considered.   4.  Small right pleural effusion.   5.  Indeterminate 1.3 cm left renal lesion  measuring above simple fluid density. Cannot distinguish hyperdense cyst from solid neoplasm on single phase CT. Recommend correlation with any prior outside imaging and otherwise characterization by nonemergent    renal protocol CT or MRI.   6.  Abnormal appearance of the uterus or vaginal cuff. A 2.4 cm soft tissue density in this region surrounded by partial rim of fluid is indeterminate but the presence of a neoplasm cannot be excluded. Correlation is needed with any available prior    outside imaging. Recommend further evaluation with nonemergent pelvic ultrasound.   7.  Age-indeterminate L3 compression deformity. Correlate for focal pain.               The study was marked in EPIC for immediate notification.      Workstation performed: [id number]                    Procedures  CriticalCare Time    Date/Time: 5/27/2024 8:15 PM    Performed by: Harpal Morrison DO  Authorized by: Harpal Morrison DO    Critical care provider statement:     Critical care time (minutes):  34    Critical care time was exclusive of:  Separately billable procedures and treating other patients and teaching time    Critical care was necessary to treat or prevent imminent or life-threatening deterioration of the following conditions:  Cardiac failure, circulatory failure and sepsis    Critical care was time spent personally by me on the following activities:  Blood draw for specimens, obtaining history from patient or surrogate, development of treatment plan with patient or surrogate, discussions with consultants, discussions with primary provider, evaluation of patient's response to treatment, examination of patient, ordering and performing treatments and interventions, ordering and review of laboratory studies, ordering and review of radiographic studies, re-evaluation of patient's condition and review of old charts    I assumed direction of critical care for this patient from another provider in my specialty: no             ED Course                                SBIRT 20yo+      Flowsheet Row Most Recent Value   Initial Alcohol Screen: US AUDIT-C     1. How often do you have a drink containing alcohol? 0 Filed at: 05/27/2024 1622   2. How many drinks containing alcohol do you have on a typical day you are drinking?  0 Filed at: 05/27/2024 1622   3a. Male UNDER 65: How often do you have five or more drinks on one occasion? 0 Filed at: 05/27/2024 1622   3b. FEMALE Any Age, or MALE 65+: How often do you have 4 or more drinks on one occassion? 0 Filed at: 05/27/2024 1622   Audit-C Score 0 Filed at: 05/27/2024 1622   STEVEN: How many times in the past year have you...    Used an illegal drug or used a prescription medication for non-medical reasons? Never Filed at: 05/27/2024 1622                      Medical Decision Making  78-year-old female presenting with persistent cough and abdominal pain.  Tachycardic on arrival.  Obtain sepsis/cardiopulmonary evaluation with labs, EKG.    Troponin slightly elevated.  Administer aspirin and start heparin.  Left bundle branch block with DREW although discordant. Patient still without any chest pain. Suspect NSTEMI type 2 in setting of pneumonia. D/w cards and also IC. Will transfer to [facility] given delta troponin.     Amount and/or Complexity of Data Reviewed  Labs: ordered.  Radiology: ordered.    Risk  OTC drugs.  Prescription drug management.             Disposition  Final diagnoses:   Pneumonia   NSTEMI (non-ST elevated myocardial infarction) (HCC)     Time reflects when diagnosis was documented in both MDM as applicable and the Disposition within this note       Time User Action Codes Description Comment    5/27/2024  7:53 PM Harpal Morrison Add [J18.9] Pneumonia     5/27/2024  7:53 PM Harpal Morrison Add [I21.4] NSTEMI (non-ST elevated myocardial infarction) (HCC)           ED Disposition       ED Disposition   Transfer to Another Facility-In Network    Condition   --    Date/Time   Mon May 27, 2024 1953    Comment    Noble LONDON Chou should be transferred out to [facility].               Follow-up Information    None         Patient's Medications   Discharge Prescriptions    No medications on file       No discharge procedures on file.    PDMP Review       None            ED Provider  Electronically Signed by             Harpal Morrison DO  05/27/24 2016

## 2024-05-28 ENCOUNTER — HOSPITAL ENCOUNTER (INPATIENT)
Facility: HOSPITAL | Age: 79
LOS: 6 days | Discharge: HOME HEALTH | DRG: 286 | End: 2024-06-03
Attending: INTERNAL MEDICINE | Admitting: INTERNAL MEDICINE
Payer: COMMERCIAL

## 2024-05-28 ENCOUNTER — APPOINTMENT (INPATIENT)
Dept: NON INVASIVE DIAGNOSTICS | Facility: HOSPITAL | Age: 79
DRG: 286 | End: 2024-05-28
Payer: COMMERCIAL

## 2024-05-28 DIAGNOSIS — I10 PRIMARY HYPERTENSION: ICD-10-CM

## 2024-05-28 DIAGNOSIS — J18.9 PNEUMONIA OF BOTH LUNGS DUE TO INFECTIOUS ORGANISM, UNSPECIFIED PART OF LUNG: ICD-10-CM

## 2024-05-28 DIAGNOSIS — E78.00 PURE HYPERCHOLESTEROLEMIA: ICD-10-CM

## 2024-05-28 DIAGNOSIS — K21.00 GASTROESOPHAGEAL REFLUX DISEASE WITH ESOPHAGITIS WITHOUT HEMORRHAGE: ICD-10-CM

## 2024-05-28 DIAGNOSIS — I42.8 NONISCHEMIC CARDIOMYOPATHY (HCC): ICD-10-CM

## 2024-05-28 DIAGNOSIS — R79.89 ELEVATED TROPONIN: Primary | ICD-10-CM

## 2024-05-28 LAB
ANION GAP SERPL CALCULATED.3IONS-SCNC: 10 MMOL/L (ref 4–13)
AORTIC ROOT: 3 CM
APICAL FOUR CHAMBER EJECTION FRACTION: 31 %
APTT PPP: 47 SECONDS (ref 23–37)
APTT PPP: 58 SECONDS (ref 23–37)
APTT PPP: 66 SECONDS (ref 23–37)
APTT PPP: 70 SECONDS (ref 23–37)
ASCENDING AORTA: 2.8 CM
ATRIAL RATE: 100 BPM
BASOPHILS # BLD AUTO: 0.01 THOUSANDS/ÂΜL (ref 0–0.1)
BASOPHILS NFR BLD AUTO: 0 % (ref 0–1)
BSA FOR ECHO PROCEDURE: 1.59 M2
BUN SERPL-MCNC: 17 MG/DL (ref 5–25)
CALCIUM SERPL-MCNC: 8.4 MG/DL (ref 8.4–10.2)
CARDIAC TROPONIN I PNL SERPL HS: 1206 NG/L (ref 8–18)
CHLORIDE SERPL-SCNC: 101 MMOL/L (ref 96–108)
CHOLEST SERPL-MCNC: 143 MG/DL
CO2 SERPL-SCNC: 23 MMOL/L (ref 21–32)
CREAT SERPL-MCNC: 0.6 MG/DL (ref 0.6–1.3)
E WAVE DECELERATION TIME: 117 MS
E/A RATIO: 0.65
EOSINOPHIL # BLD AUTO: 0 THOUSAND/ÂΜL (ref 0–0.61)
EOSINOPHIL NFR BLD AUTO: 0 % (ref 0–6)
ERYTHROCYTE [DISTWIDTH] IN BLOOD BY AUTOMATED COUNT: 14.2 % (ref 11.6–15.1)
FRACTIONAL SHORTENING: 18 (ref 28–44)
GFR SERPL CREATININE-BSD FRML MDRD: 86 ML/MIN/1.73SQ M
GLUCOSE SERPL-MCNC: 145 MG/DL (ref 65–140)
HCT VFR BLD AUTO: 35.6 % (ref 34.8–46.1)
HDLC SERPL-MCNC: 49 MG/DL
HGB BLD-MCNC: 11.6 G/DL (ref 11.5–15.4)
IMM GRANULOCYTES # BLD AUTO: 0.13 THOUSAND/UL (ref 0–0.2)
IMM GRANULOCYTES NFR BLD AUTO: 1 % (ref 0–2)
INTERVENTRICULAR SEPTUM IN DIASTOLE (PARASTERNAL SHORT AXIS VIEW): 1 CM
INTERVENTRICULAR SEPTUM: 1 CM (ref 0.6–1.1)
L PNEUMO1 AG UR QL IA.RAPID: NEGATIVE
LAAS-AP2: 17.5 CM2
LAAS-AP4: 17.1 CM2
LDLC SERPL CALC-MCNC: 71 MG/DL (ref 0–100)
LEFT ATRIUM SIZE: 3.5 CM
LEFT ATRIUM VOLUME (MOD BIPLANE): 48 ML
LEFT ATRIUM VOLUME INDEX (MOD BIPLANE): 30.2 ML/M2
LEFT INTERNAL DIMENSION IN SYSTOLE: 3.6 CM (ref 2.1–4)
LEFT VENTRICLE DIASTOLIC VOLUME (MOD BIPLANE): 121 ML
LEFT VENTRICLE DIASTOLIC VOLUME INDEX (MOD BIPLANE): 76.1 ML/M2
LEFT VENTRICLE SYSTOLIC VOLUME (MOD BIPLANE): 89 ML
LEFT VENTRICLE SYSTOLIC VOLUME INDEX (MOD BIPLANE): 56 ML/M2
LEFT VENTRICULAR INTERNAL DIMENSION IN DIASTOLE: 4.4 CM (ref 3.5–6)
LEFT VENTRICULAR POSTERIOR WALL IN END DIASTOLE: 0.9 CM
LEFT VENTRICULAR STROKE VOLUME: 31 ML
LV EF: 27 %
LVSV (TEICH): 31 ML
LYMPHOCYTES # BLD AUTO: 1.11 THOUSANDS/ÂΜL (ref 0.6–4.47)
LYMPHOCYTES NFR BLD AUTO: 9 % (ref 14–44)
MAGNESIUM SERPL-MCNC: 2.5 MG/DL (ref 1.9–2.7)
MCH RBC QN AUTO: 27.4 PG (ref 26.8–34.3)
MCHC RBC AUTO-ENTMCNC: 32.6 G/DL (ref 31.4–37.4)
MCV RBC AUTO: 84 FL (ref 82–98)
MONOCYTES # BLD AUTO: 0.78 THOUSAND/ÂΜL (ref 0.17–1.22)
MONOCYTES NFR BLD AUTO: 6 % (ref 4–12)
MV E'TISSUE VEL-SEP: 7 CM/S
MV PEAK A VEL: 1 M/S
MV PEAK E VEL: 65 CM/S
MV STENOSIS PRESSURE HALF TIME: 34 MS
MV VALVE AREA P 1/2 METHOD: 6.47
NEUTROPHILS # BLD AUTO: 10.74 THOUSANDS/ÂΜL (ref 1.85–7.62)
NEUTS SEG NFR BLD AUTO: 84 % (ref 43–75)
NONHDLC SERPL-MCNC: 94 MG/DL
NRBC BLD AUTO-RTO: 0 /100 WBCS
P AXIS: 74 DEGREES
PLATELET # BLD AUTO: 399 THOUSANDS/UL (ref 149–390)
PMV BLD AUTO: 9.2 FL (ref 8.9–12.7)
POTASSIUM SERPL-SCNC: 3.7 MMOL/L (ref 3.5–5.3)
PR INTERVAL: 150 MS
QRS AXIS: 40 DEGREES
QRSD INTERVAL: 132 MS
QT INTERVAL: 372 MS
QTC INTERVAL: 479 MS
RA PRESSURE ESTIMATED: 8 MMHG
RBC # BLD AUTO: 4.23 MILLION/UL (ref 3.81–5.12)
RIGHT ATRIAL 2D VOLUME: 30 ML
RIGHT ATRIUM AREA SYSTOLE A4C: 12.8 CM2
RIGHT VENTRICLE ID DIMENSION: 2.9 CM
RV PSP: 39 MMHG
S PNEUM AG UR QL: NEGATIVE
SL CV LEFT ATRIUM LENGTH A2C: 4.7 CM
SL CV LV EF: 30
SL CV PED ECHO LEFT VENTRICLE DIASTOLIC VOLUME (MOD BIPLANE) 2D: 87 ML
SL CV PED ECHO LEFT VENTRICLE SYSTOLIC VOLUME (MOD BIPLANE) 2D: 56 ML
SODIUM SERPL-SCNC: 134 MMOL/L (ref 135–147)
T WAVE AXIS: 118 DEGREES
TR MAX PG: 31 MMHG
TR PEAK VELOCITY: 2.8 M/S
TRICUSPID ANNULAR PLANE SYSTOLIC EXCURSION: 1.8 CM
TRICUSPID VALVE PEAK REGURGITATION VELOCITY: 2.77 M/S
TRIGL SERPL-MCNC: 116 MG/DL
VENTRICULAR RATE: 100 BPM
WBC # BLD AUTO: 12.77 THOUSAND/UL (ref 4.31–10.16)

## 2024-05-28 PROCEDURE — 80048 BASIC METABOLIC PNL TOTAL CA: CPT | Performed by: INTERNAL MEDICINE

## 2024-05-28 PROCEDURE — 85730 THROMBOPLASTIN TIME PARTIAL: CPT | Performed by: NURSE PRACTITIONER

## 2024-05-28 PROCEDURE — 93306 TTE W/DOPPLER COMPLETE: CPT | Performed by: INTERNAL MEDICINE

## 2024-05-28 PROCEDURE — 83735 ASSAY OF MAGNESIUM: CPT | Performed by: INTERNAL MEDICINE

## 2024-05-28 PROCEDURE — 85730 THROMBOPLASTIN TIME PARTIAL: CPT | Performed by: STUDENT IN AN ORGANIZED HEALTH CARE EDUCATION/TRAINING PROGRAM

## 2024-05-28 PROCEDURE — 99233 SBSQ HOSP IP/OBS HIGH 50: CPT | Performed by: STUDENT IN AN ORGANIZED HEALTH CARE EDUCATION/TRAINING PROGRAM

## 2024-05-28 PROCEDURE — 94760 N-INVAS EAR/PLS OXIMETRY 1: CPT

## 2024-05-28 PROCEDURE — 80061 LIPID PANEL: CPT | Performed by: STUDENT IN AN ORGANIZED HEALTH CARE EDUCATION/TRAINING PROGRAM

## 2024-05-28 PROCEDURE — 87449 NOS EACH ORGANISM AG IA: CPT | Performed by: NURSE PRACTITIONER

## 2024-05-28 PROCEDURE — 84484 ASSAY OF TROPONIN QUANT: CPT | Performed by: INTERNAL MEDICINE

## 2024-05-28 PROCEDURE — 93010 ELECTROCARDIOGRAM REPORT: CPT | Performed by: INTERNAL MEDICINE

## 2024-05-28 PROCEDURE — C8929 TTE W OR WO FOL WCON,DOPPLER: HCPCS

## 2024-05-28 PROCEDURE — 99223 1ST HOSP IP/OBS HIGH 75: CPT | Performed by: INTERNAL MEDICINE

## 2024-05-28 PROCEDURE — 85025 COMPLETE CBC W/AUTO DIFF WBC: CPT | Performed by: INTERNAL MEDICINE

## 2024-05-28 PROCEDURE — 85730 THROMBOPLASTIN TIME PARTIAL: CPT | Performed by: INTERNAL MEDICINE

## 2024-05-28 PROCEDURE — B246ZZ4 ULTRASONOGRAPHY OF RIGHT AND LEFT HEART, TRANSESOPHAGEAL: ICD-10-PCS | Performed by: INTERNAL MEDICINE

## 2024-05-28 RX ORDER — BENZONATATE 100 MG/1
100 CAPSULE ORAL 3 TIMES DAILY
Status: DISCONTINUED | OUTPATIENT
Start: 2024-05-28 | End: 2024-06-03 | Stop reason: HOSPADM

## 2024-05-28 RX ORDER — SODIUM CHLORIDE 9 MG/ML
100 INJECTION, SOLUTION INTRAVENOUS CONTINUOUS
Status: DISCONTINUED | OUTPATIENT
Start: 2024-05-28 | End: 2024-05-28

## 2024-05-28 RX ORDER — ASPIRIN 81 MG/1
81 TABLET, CHEWABLE ORAL DAILY
Status: DISCONTINUED | OUTPATIENT
Start: 2024-05-28 | End: 2024-06-03 | Stop reason: HOSPADM

## 2024-05-28 RX ORDER — HEPARIN SODIUM 10000 [USP'U]/100ML
3-20 INJECTION, SOLUTION INTRAVENOUS
Status: DISCONTINUED | OUTPATIENT
Start: 2024-05-28 | End: 2024-05-29

## 2024-05-28 RX ORDER — ACETAMINOPHEN 325 MG/1
650 TABLET ORAL EVERY 6 HOURS PRN
Status: DISCONTINUED | OUTPATIENT
Start: 2024-05-28 | End: 2024-06-03 | Stop reason: HOSPADM

## 2024-05-28 RX ORDER — GUAIFENESIN 600 MG/1
1200 TABLET, EXTENDED RELEASE ORAL EVERY 12 HOURS SCHEDULED
Status: DISCONTINUED | OUTPATIENT
Start: 2024-05-28 | End: 2024-06-03 | Stop reason: HOSPADM

## 2024-05-28 RX ORDER — SODIUM CHLORIDE 9 MG/ML
100 INJECTION, SOLUTION INTRAVENOUS CONTINUOUS
Status: DISCONTINUED | OUTPATIENT
Start: 2024-05-29 | End: 2024-05-29

## 2024-05-28 RX ORDER — HEPARIN SODIUM 1000 [USP'U]/ML
3300 INJECTION, SOLUTION INTRAVENOUS; SUBCUTANEOUS EVERY 6 HOURS PRN
Status: DISCONTINUED | OUTPATIENT
Start: 2024-05-28 | End: 2024-05-29

## 2024-05-28 RX ORDER — HEPARIN SODIUM 1000 [USP'U]/ML
1650 INJECTION, SOLUTION INTRAVENOUS; SUBCUTANEOUS EVERY 6 HOURS PRN
Status: DISCONTINUED | OUTPATIENT
Start: 2024-05-28 | End: 2024-05-29

## 2024-05-28 RX ORDER — PANTOPRAZOLE SODIUM 40 MG/1
40 TABLET, DELAYED RELEASE ORAL
Status: DISCONTINUED | OUTPATIENT
Start: 2024-05-28 | End: 2024-06-03 | Stop reason: HOSPADM

## 2024-05-28 RX ORDER — LIDOCAINE 50 MG/G
2 PATCH TOPICAL DAILY
Status: DISCONTINUED | OUTPATIENT
Start: 2024-05-28 | End: 2024-06-03 | Stop reason: HOSPADM

## 2024-05-28 RX ORDER — METOPROLOL TARTRATE 50 MG/1
50 TABLET, FILM COATED ORAL EVERY 12 HOURS SCHEDULED
Status: DISCONTINUED | OUTPATIENT
Start: 2024-05-28 | End: 2024-05-29

## 2024-05-28 RX ORDER — ATORVASTATIN CALCIUM 40 MG/1
80 TABLET, FILM COATED ORAL
Status: DISCONTINUED | OUTPATIENT
Start: 2024-05-28 | End: 2024-06-03 | Stop reason: HOSPADM

## 2024-05-28 RX ORDER — POTASSIUM CHLORIDE 20 MEQ/1
40 TABLET, EXTENDED RELEASE ORAL ONCE
Status: COMPLETED | OUTPATIENT
Start: 2024-05-28 | End: 2024-05-28

## 2024-05-28 RX ORDER — DOXYCYCLINE HYCLATE 100 MG/1
100 CAPSULE ORAL EVERY 12 HOURS
Status: DISCONTINUED | OUTPATIENT
Start: 2024-05-28 | End: 2024-05-29

## 2024-05-28 RX ADMIN — HEPARIN SODIUM 14 UNITS/KG/HR: 10000 INJECTION, SOLUTION INTRAVENOUS at 02:03

## 2024-05-28 RX ADMIN — ATORVASTATIN CALCIUM 80 MG: 40 TABLET, FILM COATED ORAL at 01:56

## 2024-05-28 RX ADMIN — BENZONATATE 100 MG: 100 CAPSULE ORAL at 15:39

## 2024-05-28 RX ADMIN — HEPARIN SODIUM 16 UNITS/KG/HR: 10000 INJECTION, SOLUTION INTRAVENOUS at 22:23

## 2024-05-28 RX ADMIN — TICAGRELOR 90 MG: 90 TABLET ORAL at 21:59

## 2024-05-28 RX ADMIN — GUAIFENESIN 1200 MG: 600 TABLET ORAL at 08:04

## 2024-05-28 RX ADMIN — METOPROLOL TARTRATE 25 MG: 25 TABLET, FILM COATED ORAL at 14:20

## 2024-05-28 RX ADMIN — HEPARIN SODIUM 1650 UNITS: 1000 INJECTION INTRAVENOUS; SUBCUTANEOUS at 09:27

## 2024-05-28 RX ADMIN — HEPARIN SODIUM 1650 UNITS: 1000 INJECTION INTRAVENOUS; SUBCUTANEOUS at 02:05

## 2024-05-28 RX ADMIN — PANTOPRAZOLE SODIUM 40 MG: 40 TABLET, DELAYED RELEASE ORAL at 05:12

## 2024-05-28 RX ADMIN — DOXYCYCLINE 100 MG: 100 CAPSULE ORAL at 15:39

## 2024-05-28 RX ADMIN — Medication 30 MG: at 21:59

## 2024-05-28 RX ADMIN — Medication 30 MG: at 15:39

## 2024-05-28 RX ADMIN — GUAIFENESIN 1200 MG: 600 TABLET ORAL at 21:59

## 2024-05-28 RX ADMIN — ATORVASTATIN CALCIUM 80 MG: 40 TABLET, FILM COATED ORAL at 15:39

## 2024-05-28 RX ADMIN — BENZONATATE 100 MG: 100 CAPSULE ORAL at 08:04

## 2024-05-28 RX ADMIN — TICAGRELOR 90 MG: 90 TABLET ORAL at 08:04

## 2024-05-28 RX ADMIN — CEFTRIAXONE SODIUM 1000 MG: 10 INJECTION, POWDER, FOR SOLUTION INTRAVENOUS at 06:17

## 2024-05-28 RX ADMIN — PERFLUTREN 0.6 ML/MIN: 6.52 INJECTION, SUSPENSION INTRAVENOUS at 10:50

## 2024-05-28 RX ADMIN — Medication 30 MG: at 08:04

## 2024-05-28 RX ADMIN — AMIODARONE HYDROCHLORIDE 1 MG/MIN: 50 INJECTION, SOLUTION INTRAVENOUS at 15:51

## 2024-05-28 RX ADMIN — METOPROLOL TARTRATE 25 MG: 25 TABLET, FILM COATED ORAL at 01:56

## 2024-05-28 RX ADMIN — AMIODARONE HYDROCHLORIDE 150 MG: 50 INJECTION, SOLUTION INTRAVENOUS at 15:43

## 2024-05-28 RX ADMIN — POTASSIUM CHLORIDE 40 MEQ: 1500 TABLET, EXTENDED RELEASE ORAL at 13:13

## 2024-05-28 RX ADMIN — METOPROLOL TARTRATE 50 MG: 50 TABLET, FILM COATED ORAL at 21:59

## 2024-05-28 RX ADMIN — BENZONATATE 100 MG: 100 CAPSULE ORAL at 22:00

## 2024-05-28 RX ADMIN — DOXYCYCLINE 100 MG: 100 CAPSULE ORAL at 01:55

## 2024-05-28 RX ADMIN — ASPIRIN 81 MG CHEWABLE TABLET 81 MG: 81 TABLET CHEWABLE at 08:04

## 2024-05-28 RX ADMIN — SODIUM CHLORIDE 100 ML/HR: 0.9 INJECTION, SOLUTION INTRAVENOUS at 13:22

## 2024-05-28 RX ADMIN — BENZONATATE 100 MG: 100 CAPSULE ORAL at 01:55

## 2024-05-28 RX ADMIN — LIDOCAINE 5% 2 PATCH: 700 PATCH TOPICAL at 01:56

## 2024-05-28 RX ADMIN — GUAIFENESIN 1200 MG: 600 TABLET ORAL at 01:55

## 2024-05-28 NOTE — SPEECH THERAPY NOTE
Speech Language/Pathology    Speech/Language Pathology Cancel Note    Patient Name: Noble Chou  Today's Date: 5/28/2024     Problem List  Principal Problem:    Elevated troponin  Active Problems:    Pure hypercholesterolemia    Sepsis (HCC)    Pneumonia    Abnormal CT scan    Compression deformity of vertebra    Primary hypertension      Consult rec'd for swallow eval. Pt NPO for possible cardiac cath, will hold swallow eval today and follow up to complete when medically able.       Raya Gutierrez MA CCC-SLP  Speech Pathologist  Available via  tiger text

## 2024-05-28 NOTE — ASSESSMENT & PLAN NOTE
On fosinopril as op, hold for now pending cardiology eval +/- cardiac cath  Started on metoprolol due to above

## 2024-05-28 NOTE — ASSESSMENT & PLAN NOTE
Presented to ED with abdominal pain, cough.  CT: RLL pneumonia. Small right pleural effusion. Patchy left sided opacities with endoluminal debris.  Consideration for aspiration.   Antibiotics   Completed Z vale as outpatietn  Cefepime, vancomycin in ED    Continue with ceftriaxone, doxycycline.  Tessalon TID, mucinex BID  Speech consult

## 2024-05-28 NOTE — CONSULTS
Consultation - Cardiology Team One  Noble Chou 78 y.o. female MRN: [id number]  Unit/Bed#: S -01 Encounter: [id number]    Inpatient consult to Cardiology  Consult performed by: CAROL Harris  Consult ordered by: CAROL Narvaez          Physician Requesting Consult: Devin Birch MD  Reason for Consult / Principal Problem: Elevated troponin       Assessment/ Plan    Elevated troponin   HS troponin 754-->2,517-->2,556-->1,206  No complaint of chest pain or SOB at rest or with exertion   Echocardiogram pending  Reviewed ECG showing LBBB (patient reports history of BBB)  Currently on heparin gtt   Loaded with aspirin and Brilinta   On DAPT: aspirin and brilinta, atorvastatin 80 mg PO daily and metoprolol 25 mg PO BID   Will d/w attending regarding ischemic workup   Currently NPO     2. Hypertension   BP stable: 139/75  She takes fosinopril 10 mg PO BID at home prior to admission   Currently on metoprolol 25 mg PO BID   Continue to monitor     3. Pneumonia   On antibiotic: doxycycline and ceftriaxone   On tessalon pearls   Blood cultures pending   Followed by primary team     4. Hyperlipidemia   On pravastatin 40 mg PO daily at home  Currently on atorvastatin 80 mg PO daily       History of Present Illness   HPI: Noble Chou is a 78 y.o. year old female who has a history of hypertension, hyperlipidemia, osteoporosis and rios esophagus. She follows with cardiologist Dr. Caceres from [place]. She has no history of known CAD, heart failure or dysrhythmias.            She presents to [facility] ER 5/27/2024 with a cough and fever. Patient reports symptoms of cough for the past 1-2 weeks but yesterday she developed a fever. She did see her PCP for symptoms and was treated with a Z pac and steroids which she completed over the weekend. She reports her symptoms have not improved and she continues with a cough that keeps her awake. She lives at home with her . She goes to PT twice a week for her hip. She  reports no chest pain or SOB with exertion during her PT. She also lives in a two story home where her bedroom is on the second floor. She reports she is able to climb the flight of stairs without complaint of chest pain or SOB. Her only complaint is the cough.     She reports no family history of heart disease. She denies tobacco or alcohol use. She uses a cane to ambulate. She has history of  ECG with LBBB. This was found in routine pre op testing prior to her hip replacement about 4 years ago. She was referred to cardiology and at that time and had stress test with no significant abnormalities noted.     EKG reviewed personally:  Normal sinus rhythm with LBBB  Ventricular rate 100 bpm   MA interval 150 ms  QRSD 132 ms  QT interval 372 ms  QTc interval 479 ms     Telemetry reviewed personally:   Normal sinus rhythm, no ectopy     Review of Systems   Constitutional: Positive for malaise/fatigue. Negative for chills and fever.   Cardiovascular:  Negative for chest pain, dyspnea on exertion, leg swelling, orthopnea and palpitations.   Respiratory:  Positive for cough.    Musculoskeletal:  Negative for falls.   Gastrointestinal:  Negative for bloating, nausea and vomiting.   Neurological:  Negative for dizziness and light-headedness.   Psychiatric/Behavioral:  Negative for altered mental status.    All other systems reviewed and are negative.    Historical Information   Past Medical History:   Diagnosis Date    Edmondson esophagus     Bundle-branch block     Cervical spine fracture (HCC)     Colon polyp     GERD (gastroesophageal reflux disease)     Hypertension     Post-nasal drip     causing cough    Pure hypercholesterolemia 07/01/2019     Past Surgical History:   Procedure Laterality Date    COLONOSCOPY  09/08/2017    5 yr recall    JOINT REPLACEMENT Right     hip    TIBIAL IM QUIRINO REMOVAL      TOTAL HIP ARTHROPLASTY      UPPER GASTROINTESTINAL ENDOSCOPY  10/18/2018    3 yr recall     Social History     Substance and  Sexual Activity   Alcohol Use Never     Social History     Substance and Sexual Activity   Drug Use Never     Social History     Tobacco Use   Smoking Status Never    Passive exposure: Never   Smokeless Tobacco Never     Family History:   Family History   Problem Relation Age of Onset    Colon polyps Sister     Colon cancer Neg Hx        Meds/Allergies   all current active meds have been reviewed and current meds:   Current Facility-Administered Medications   Medication Dose Route Frequency    acetaminophen (TYLENOL) tablet 650 mg  650 mg Oral Q6H PRN    aspirin chewable tablet 81 mg  81 mg Oral Daily    atorvastatin (LIPITOR) tablet 80 mg  80 mg Oral Daily With Dinner    benzonatate (TESSALON PERLES) capsule 100 mg  100 mg Oral TID    ceftriaxone (ROCEPHIN) 1 g/50 mL in dextrose IVPB  1,000 mg Intravenous Q24H    co-enzyme Q-10 capsule 30 mg  30 mg Oral TID    doxycycline hyclate (VIBRAMYCIN) capsule 100 mg  100 mg Oral Q12H    guaiFENesin (MUCINEX) 12 hr tablet 1,200 mg  1,200 mg Oral Q12H DEVON    heparin (porcine) 25,000 units in 0.45% NaCl 250 mL infusion (premix)  3-20 Units/kg/hr (Order-Specific) Intravenous Titrated    heparin (porcine) injection 1,650 Units  1,650 Units Intravenous Q6H PRN    heparin (porcine) injection 3,300 Units  3,300 Units Intravenous Q6H PRN    lidocaine (LIDODERM) 5 % patch 2 patch  2 patch Topical Daily    metoprolol tartrate (LOPRESSOR) tablet 25 mg  25 mg Oral Q12H DEVON    pantoprazole (PROTONIX) EC tablet 40 mg  40 mg Oral Early Morning    ticagrelor (BRILINTA) tablet 90 mg  90 mg Oral Q12H DEVON     heparin (porcine), 3-20 Units/kg/hr (Order-Specific), Last Rate: 14 Units/kg/hr (24 0203)        No Known Allergies    Objective   Vitals: Blood pressure 139/75, pulse 70, temperature 98.2 °F (36.8 °C), resp. rate 18, SpO2 94%.,     There is no height or weight on file to calculate BMI.,     Systolic (24hrs), Av , Min:119 , Max:143     Diastolic (24hrs), Av, Min:70,  Max:88      No intake or output data in the 24 hours ending 05/28/24 0905  Weight (last 2 days)       None          Invasive Devices       Peripheral Intravenous Line  Duration             Peripheral IV 05/27/24 Distal;Right;Upper;Ventral (anterior) Arm <1 day    Peripheral IV 05/27/24 Right Antecubital <1 day                  Physical Exam  Constitutional:       General: She is not in acute distress.  HENT:      Head: Normocephalic.      Mouth/Throat:      Mouth: Mucous membranes are moist.   Cardiovascular:      Rate and Rhythm: Normal rate and regular rhythm.      Pulses: Normal pulses.   Pulmonary:      Effort: Pulmonary effort is normal. No respiratory distress.      Breath sounds: Normal breath sounds.   Abdominal:      General: Bowel sounds are normal.      Palpations: Abdomen is soft.   Musculoskeletal:         General: No swelling.      Cervical back: Neck supple.   Skin:     General: Skin is warm and dry.      Capillary Refill: Capillary refill takes less than 2 seconds.   Neurological:      Mental Status: She is alert and oriented to person, place, and time.   Psychiatric:         Mood and Affect: Mood normal.         LABORATORY RESULTS:      CBC with diff:   Results from last 7 days   Lab Units 05/28/24  0824 05/27/24  1642   WBC Thousand/uL 12.77* 18.78*   HEMOGLOBIN g/dL 11.6 12.1   HEMATOCRIT % 35.6 38.6   MCV fL 84 86   PLATELETS Thousands/uL 399* 415*   RBC Million/uL 4.23 4.49   MCH pg 27.4 26.9   MCHC g/dL 32.6 31.3*   RDW % 14.2 13.9   MPV fL 9.2 9.1   NRBC AUTO /100 WBCs 0 0       CMP:  Results from last 7 days   Lab Units 05/28/24  0824 05/27/24  1642   POTASSIUM mmol/L 3.7 3.7   CHLORIDE mmol/L 101 100   CO2 mmol/L 23 23   BUN mg/dL 17 16   CREATININE mg/dL 0.60 0.75   CALCIUM mg/dL 8.4 8.8   AST U/L  --  24   ALT U/L  --  23   ALK PHOS U/L  --  108*   EGFR ml/min/1.73sq m 86 75       BMP:  Results from last 7 days   Lab Units 05/28/24  0824 05/27/24  1642   POTASSIUM mmol/L 3.7 3.7   CHLORIDE  "mmol/L 101 100   CO2 mmol/L 23 23   BUN mg/dL 17 16   CREATININE mg/dL 0.60 0.75   CALCIUM mg/dL 8.4 8.8     Results from last 7 days   Lab Units 05/28/24  0824   MAGNESIUM mg/dL 2.5       Results from last 7 days   Lab Units 05/27/24  1642   INR  1.29*     Lipid Profile:   No results found for: \"CHOL\"  No results found for: \"HDL\"  No results found for: \"LDLCALC\"  No results found for: \"TRIG\"      Cardiac testing:   No results found for this or any previous visit.    No results found for this or any previous visit.    No valid procedures specified.  No results found for this or any previous visit.    Imaging:   PE Study with CT abdomen & pelvis with contrast    Result Date: 5/27/2024  Narrative: CT PULMONARY ANGIOGRAM OF THE CHEST AND CT ABDOMEN AND PELVIS WITH INTRAVENOUS CONTRAST INDICATION: SOB, cough, elevated troponin. COMPARISON: None. TECHNIQUE: CT examination of the chest, abdomen and pelvis was performed. Thin section CT angiographic technique was used in the chest in order to evaluate for pulmonary embolus and coronal 3D MIP postprocessing was performed on the acquisition scanner. Multiplanar 2D reformatted images were created from the source data. This examination, like all CT scans performed in the [facility], was performed utilizing techniques to minimize radiation dose exposure, including the use of iterative reconstruction and automated exposure control. Radiation dose length product (DLP) for this visit: 705.06 mGy-cm IV Contrast: 85 mL of iohexol (OMNIPAQUE) Enteric Contrast: Not administered. FINDINGS: CHEST PULMONARY ARTERIAL TREE: No pulmonary embolus is seen. LUNGS: Large consolidation in the right lower lobe. Debris within the right lower lobe airways. Additional patchy opacities and small amount of endoluminal debris in the lingula and left lower lobe. PLEURA: Small right pleural effusion. HEART/AORTA: Heart is unremarkable for patient's age. No thoracic aortic aneurysm. " MEDIASTINUM AND JERE: Mediastinal lymphadenopathy is likely reactive. CHEST WALL AND LOWER NECK: Unremarkable. ABDOMEN LIVER/BILIARY TREE: Unremarkable. GALLBLADDER: No calcified gallstones. No pericholecystic inflammatory change. SPLEEN: Unremarkable. PANCREAS: Unremarkable. ADRENAL GLANDS: Unremarkable. KIDNEYS/URETERS: 1.3 cm left renal lesion measuring above fluid density, indeterminate on single phase exam for hyperdense cyst versus solid neoplasm. No hydronephrosis. STOMACH AND BOWEL: Unremarkable. APPENDIX: No findings to suggest appendicitis. ABDOMINOPELVIC CAVITY: No ascites. No pneumoperitoneum. No lymphadenopathy. VESSELS: Atherosclerosis without abdominal aortic aneurysm. PELVIS REPRODUCTIVE ORGANS: Abnormal appearance of the uterus or vaginal cuff. The uterus has a somewhat diminutive appearance which could be due to age-related atrophy versus the sequela of previous hysterectomy. But in either case there is a 2.4 x 2.1 cm soft  tissue density which is partially surrounded by a rim of fluid as demonstrated on series 3 image 129. This is difficult to interpret in the absence of comparison imaging as well as artifact in the pelvis from the right hip arthroplasty but the presence of a neoplasm cannot be excluded. URINARY BLADDER: Unremarkable. ABDOMINAL WALL/INGUINAL REGIONS: Unremarkable. BONES: Degenerative changes in the spine. Age-indeterminate L3 compression deformity. Grade 1 anterolisthesis of L5 on S1 secondary to bilateral L5 pars defects. Right total hip arthroplasty. Mild degenerative change at the left hip joint.     Impression: 1.  No pulmonary embolism. 2.  Large right lower lobe consolidation and patchy left-sided opacities with endoluminal debris, consistent with pneumonia. 3.  Retained debris in the esophagus. Given the lung opacities and endoluminal debris, aspiration should also be considered. 4.  Small right pleural effusion. 5.  Indeterminate 1.3 cm left renal lesion measuring above  simple fluid density. Cannot distinguish hyperdense cyst from solid neoplasm on single phase CT. Recommend correlation with any prior outside imaging and otherwise characterization by nonemergent  renal protocol CT or MRI. 6.  Abnormal appearance of the uterus or vaginal cuff. A 2.4 cm soft tissue density in this region surrounded by partial rim of fluid is indeterminate but the presence of a neoplasm cannot be excluded. Correlation is needed with any available prior outside imaging. Recommend further evaluation with nonemergent pelvic ultrasound. 7.  Age-indeterminate L3 compression deformity. Correlate for focal pain. The study was marked in EPIC for immediate notification. Workstation performed: [id number]     Thank you for allowing us to participate in this patient's care.   Counseling / Coordination of Care  Total floor / unit time spent today 45 minutes.  Greater than 50% of total time was spent with the patient and / or family counseling and / or coordination of care.  A description of the counseling / coordination of care: Review of history, current assessment, development of a plan.      Code Status: Level 1 - Full Code    ** Please Note: Dragon 360 Dictation voice to text software may have been used in the creation of this document. **

## 2024-05-28 NOTE — NURSING NOTE
Patient having 4-6 beat runs of VTACH sporadically, patient asymptomatic, denies chest pain, SOB, palpitations. Attending and cardiology made aware.

## 2024-05-28 NOTE — ASSESSMENT & PLAN NOTE
Denies chest pain. Troponin [phone]  EKG: LBBB, PACs  Case reviewed by ED with interventional cardiology - transferred to [facility] for non emergent cath.  Loaded with Brilenta ASA.  ACS heparin.  Increase statin   Start metoprolol 25 mg bid  A1c, lipid panel pending  Today's updated plan:  Continue with IV heparin, patient has already been given beta-blocker.  1 dose of per oral KCl ordered.  Patient had recurrent NSVT.  Cardiology on-call informed recommended monitoring and keep potassium more than 4.  Plan for left heart cath today

## 2024-05-28 NOTE — NURSING NOTE
Patient on Amio gtt, had 4 beat run of VTACH. Patient asymptomatic, denies chest pain, SOB, palpitations. Provider made aware via TT.

## 2024-05-28 NOTE — PLAN OF CARE
Problem: PAIN - ADULT  Goal: Verbalizes/displays adequate comfort level or baseline comfort level  Description: Interventions:  - Encourage patient to monitor pain and request assistance  - Assess pain using appropriate pain scale  - Administer analgesics based on type and severity of pain and evaluate response  - Implement non-pharmacological measures as appropriate and evaluate response  - Consider cultural and social influences on pain and pain management  - Notify physician/advanced practitioner if interventions unsuccessful or patient reports new pain  Reactivated     Problem: INFECTION - ADULT  Goal: Absence or prevention of progression during hospitalization  Description: INTERVENTIONS:  - Assess and monitor for signs and symptoms of infection  - Monitor lab/diagnostic results  - Monitor all insertion sites, i.e. indwelling lines, tubes, and drains  - Monitor endotracheal if appropriate and nasal secretions for changes in amount and color  - [place] appropriate cooling/warming therapies per order  - Administer medications as ordered  - Instruct and encourage patient and family to use good hand hygiene technique  - Identify and instruct in appropriate isolation precautions for identified infection/condition  Reactivated  Goal: Absence of fever/infection during neutropenic period  Description: INTERVENTIONS:  - Monitor WBC    Reactivated     Problem: SAFETY ADULT  Goal: Patient will remain free of falls  Description: INTERVENTIONS:  - Educate patient/family on patient safety including physical limitations  - Instruct patient to call for assistance with activity   - Consult OT/PT to assist with strengthening/mobility   - Keep Call bell within reach  - Keep bed low and locked with side rails adjusted as appropriate  - Keep care items and personal belongings within reach  - Initiate and maintain comfort rounds  - Make Fall Risk Sign visible to staff  - Offer Toileting every 2 Hours, in advance of need  -  Initiate/Maintain alarm  - Obtain necessary fall risk management equipment  - Apply yellow socks and bracelet for high fall risk patients  - Consider moving patient to room near nurses station  Reactivated  Goal: Maintain or return to baseline ADL function  Description: INTERVENTIONS:  -  Assess patient's ability to carry out ADLs; assess patient's baseline for ADL function and identify physical deficits which impact ability to perform ADLs (bathing, care of mouth/teeth, toileting, grooming, dressing, etc.)  - Assess/evaluate cause of self-care deficits   - Assess range of motion  - Assess patient's mobility; develop plan if impaired  - Assess patient's need for assistive devices and provide as appropriate  - Encourage maximum independence but intervene and supervise when necessary  - Involve family in performance of ADLs  - Assess for home care needs following discharge   - Consider OT consult to assist with ADL evaluation and planning for discharge  - Provide patient education as appropriate  Reactivated  Goal: Maintains/Returns to pre admission functional level  Description: INTERVENTIONS:  - Perform AM-PAC 6 Click Basic Mobility/ Daily Activity assessment daily.  - Set and communicate daily mobility goal to care team and patient/family/caregiver.   - Collaborate with rehabilitation services on mobility goals if consulted  - Perform Range of Motion 3 times a day.  - Reposition patient every 2 hours.  - Dangle patient 3 times a day  - Stand patient 3 times a day  - Ambulate patient 3 times a day  - Out of bed to chair 3 times a day   - Out of bed for meals 3 times a day  - Out of bed for toileting  - Record patient progress and toleration of activity level   Reactivated     Problem: DISCHARGE PLANNING  Goal: Discharge to home or other facility with appropriate resources  Description: INTERVENTIONS:  - Identify barriers to discharge w/patient and caregiver  - Arrange for needed discharge resources and  transportation as appropriate  - Identify discharge learning needs (meds, wound care, etc.)  - Arrange for interpretive services to assist at discharge as needed  - Refer to Case Management Department for coordinating discharge planning if the patient needs post-hospital services based on physician/advanced practitioner order or complex needs related to functional status, cognitive ability, or social support system  Reactivated

## 2024-05-28 NOTE — ASSESSMENT & PLAN NOTE
Tachycardia, leukocytosis (recently completed course of prednisone on the weekend).  Lactate normal.  Procalcitonin normal.   Source: RLL pneumonia  Antibiotics: completed z vale as outpatient.  Cefepime, vancomycin in ED.   Continue ceftriaxone  Urine antigens, blood cultures pending

## 2024-05-28 NOTE — ED NOTES
"Pt's  out to nurses station stating, \"my wife has been here for hours, she hasn't eaten since 9AM, is this what goes on here at this place, is this your normal protocol? You don't give people anything to eat?\" Charge nurse explained to pt and  that when you come to the ER having a medical emergency, it is protocol to stabilize the pt, therefore food is not able to be given until medically cleared.        Janeen Lemons RN  05/27/24 1460    "

## 2024-05-28 NOTE — ASSESSMENT & PLAN NOTE
Denies chest pain. Troponin [phone]  EKG: LBBB, PACs  Case reviewed by ED with interventional cardiology - transferred to [facility] for non emergent cath.  Loaded with Brilenta, ASA.  ACS heparin.  Increase statin   Start metoprolol 25 mg bid  A1c, lipid panel pending  Cardiology consulted

## 2024-05-28 NOTE — EMTALA/ACUTE CARE TRANSFER
[facility] EMERGENCY DEPARTMENT  [address]  [facility] [place] [address]  Dept: [phone]      EMTALA TRANSFER CONSENT    NAME Noble Chou                                         1945                              MRN [id number]    I have been informed of my rights regarding examination, treatment, and transfer   by Dr. Harpal Morrison DO    Benefits: Specialized equipment and/or services available at the receiving facility (Include comment)________________________    Risks: Potential for delay in receiving treatment      Consent for Transfer:  I acknowledge that my medical condition has been evaluated and explained to me by the emergency department physician or other qualified medical person and/or my attending physician, who has recommended that I be transferred to the service of  Accepting Physician: Dr. Yang at Accepting Facility Name, City & State : [facility]. The above potential benefits of such transfer, the potential risks associated with such transfer, and the probable risks of not being transferred have been explained to me, and I fully understand them.  The doctor has explained that, in my case, the benefits of transfer outweigh the risks.  I agree to be transferred.    I authorize the performance of emergency medical procedures and treatments upon me in both transit and upon arrival at the receiving facility.  Additionally, I authorize the release of any and all medical records to the receiving facility and request they be transported with me, if possible.  I understand that the safest mode of transportation during a medical emergency is an ambulance and that the Hospital advocates the use of this mode of transport. Risks of traveling to the receiving facility by car, including absence of medical control, life sustaining equipment, such as oxygen, and medical personnel has been explained to me and I fully understand them.    (ADOLPH CORRECT BOX BELOW)  [  ]  I consent to  the stated transfer and to be transported by ambulance/helicopter.  [  ]  I consent to the stated transfer, but refuse transportation by ambulance and accept full responsibility for my transportation by car.  I understand the risks of non-ambulance transfers and I exonerate the Hospital and its staff from any deterioration in my condition that results from this refusal.    X___________________________________________    DATE  24  TIME________  Signature of patient or legally responsible individual signing on patient behalf           RELATIONSHIP TO PATIENT_________________________          Provider Certification    NAME Noble Chou                                         1945                              MRN [id number]    A medical screening exam was performed on the above named patient.  Based on the examination:    Condition Necessitating Transfer The primary encounter diagnosis was Pneumonia. A diagnosis of NSTEMI (non-ST elevated myocardial infarction) (HCC) was also pertinent to this visit.    Patient Condition: The patient has been stabilized such that within reasonable medical probability, no material deterioration of the patient condition or the condition of the unborn child(yeny) is likely to result from the transfer    Reason for Transfer: Level of Care needed not available at this facility    Transfer Requirements: Facility RA   Space available and qualified personnel available for treatment as acknowledged by    Agreed to accept transfer and to provide appropriate medical treatment as acknowledged by       Dr. Yang  Appropriate medical records of the examination and treatment of the patient are provided at the time of transfer   STAFF INITIAL WHEN COMPLETED _______  Transfer will be performed by qualified personnel from    and appropriate transfer equipment as required, including the use of necessary and appropriate life support measures.    Provider Certification: I have examined the  patient and explained the following risks and benefits of being transferred/refusing transfer to the patient/family:  General risk, such as traffic hazards, adverse weather conditions, rough terrain or turbulence, possible failure of equipment (including vehicle or aircraft), or consequences of actions of persons outside the control of the transport personnel      Based on these reasonable risks and benefits to the patient and/or the unborn child(yeny), and based upon the information available at the time of the patient’s examination, I certify that the medical benefits reasonably to be expected from the provision of appropriate medical treatments at another medical facility outweigh the increasing risks, if any, to the individual’s medical condition, and in the case of labor to the unborn child, from effecting the transfer.    X____________________________________________ DATE 05/27/24        TIME_______      ORIGINAL - SEND TO MEDICAL RECORDS   COPY - SEND WITH PATIENT DURING TRANSFER

## 2024-05-28 NOTE — ASSESSMENT & PLAN NOTE
Indeterminate 1.3 left renal lesion.  Cannot distinguish hyperdenst cyst from solid neoplasm on single phase CT.    Nonemergent renal protocol or MRI  Abnormal appearance of uterus or vaginal cuff.  Soft tissue density surrounded by rim of fluid is indeterminate but presence of neoplasm cannot be excluded  Non emergent pelvic US  Consider imaging as inpatient vs outpatient.  Patient is aware of above findings

## 2024-05-28 NOTE — PROGRESS NOTES
[facility]  Progress Note  Name: Noble Chou I  MRN: [id number]  Unit/Bed#: S -01 I Date of Admission: 5/28/2024   Date of Service: 5/28/2024 I Hospital Day: 0    Assessment & Plan   Sepsis (HCC)  Assessment & Plan  Tachycardia, leukocytosis (recently completed course of prednisone on the weekend).  Lactate normal.  Procalcitonin normal.   Source: RLL pneumonia  Antibiotics: completed z vale as outpatient.  Cefepime, vancomycin in ED.   Continue ceftriaxone  Urine antigens, blood cultures pending    * Elevated troponin  Assessment & Plan  Denies chest pain. Troponin [phone]  EKG: LBBB, PACs  Case reviewed by ED with interventional cardiology - transferred to [facility] for non emergent cath.  Loaded with Brilenta, ASA.  ACS heparin.  Increase statin   Start metoprolol 25 mg bid  A1c, lipid panel pending  Today's updated plan:  Continue with IV heparin, patient has already been given beta-blocker.  1 dose of per oral KCl ordered.  Patient had recurrent NSVT.  Cardiology on-call informed recommended monitoring and keep potassium more than 4.  Plan for left heart cath today    Abnormal CT scan  Assessment & Plan  Indeterminate 1.3 left renal lesion.  Cannot distinguish hyperdenst cyst from solid neoplasm on single phase CT.    Nonemergent renal protocol or MRI  Abnormal appearance of uterus or vaginal cuff.  Soft tissue density surrounded by rim of fluid is indeterminate but presence of neoplasm cannot be excluded  Non emergent pelvic US  Consider imaging as inpatient vs outpatient.  Patient is aware of above findings    Pneumonia  Assessment & Plan  Presented to ED with abdominal pain, cough.  CT: RLL pneumonia. Small right pleural effusion. Patchy left sided opacities with endoluminal debris.  Consideration for aspiration.   Antibiotics   Completed Z vale as outpatietn  Cefepime, vancomycin in ED    Continue with ceftriaxone, doxycycline.  Tessalon TID, mucinex BID  Speech  consult    Primary hypertension  Assessment & Plan  On fosinopril as op, hold for now pending cardiology eval +/- cardiac cath  Started on metoprolol due to above    Compression deformity of vertebra  Assessment & Plan  Age indetermine L3 compression deformity   Has occasional mild low back pain, none currently.    Pure hypercholesterolemia  Assessment & Plan  Maintained on pravastatin as outpatient  Increase to atorvastatin 80 mg  Lipid panel ordered               VTE Pharmacologic Prophylaxis: VTE Score: 6 Moderate Risk (Score 3-4) - Pharmacological DVT Prophylaxis Ordered: heparin drip.    Mobility:   Basic Mobility Inpatient Raw Score: 18  JH-HLM Goal: 6: Walk 10 steps or more  JH-HLM Achieved: 6: Walk 10 steps or more  JH-HLM Goal NOT achieved. Continue with multidisciplinary rounding and encourage appropriate mobility to improve upon JH-HLM goals.    Patient Centered Rounds: I performed bedside rounds with nursing staff today.   Discussions with Specialists or Other Care Team Provider: Cardiology    Education and Discussions with Family / Patient: Updated  (son) at bedside.        Current Length of Stay: 0 day(s)  Current Patient Status: Inpatient   Certification Statement: The patient will continue to require additional inpatient hospital stay due to left heart cath/pneumonia  Discharge Plan: Anticipate discharge in 48-72 hrs to yet to be decided    Code Status: Level 1 - Full Code    Subjective:   Patient still complaining of intermittent cough but better than before.  No active chest pain, shortness of breath.  Patient had intermittent short runs of NSVT.  Informed cardiology recommended keep potassium more than 4 and make sure she is getting beta-blockers.    Objective:     Vitals:   Temp (24hrs), Av °F (36.7 °C), Min:97.8 °F (36.6 °C), Max:98.2 °F (36.8 °C)    Temp:  [97.8 °F (36.6 °C)-98.2 °F (36.8 °C)] 98.2 °F (36.8 °C)  HR:  [63-85] 68  Resp:  [16-18] 18  BP: (119-143)/(70-88)  139/75  SpO2:  [92 %-94 %] 92 %  Body mass index is 23.98 kg/m².     Input and Output Summary (last 24 hours):   No intake or output data in the 24 hours ending 05/28/24 1321    Physical Exam:   Constitutional: No acute distress  HEENT: Pallor or icterus negative  CVS: S1 plus S2  Respiratory: Normal vascular breathe without crackles and wheeze  Gastroenterology: Soft nontender without any palpable mass  Skin: No bruises or ecchymosis  Neurology: No focal logical deficit      Additional Data:     Labs:  Results from last 7 days   Lab Units 05/28/24  0824   WBC Thousand/uL 12.77*   HEMOGLOBIN g/dL 11.6   HEMATOCRIT % 35.6   PLATELETS Thousands/uL 399*   SEGS PCT % 84*   LYMPHO PCT % 9*   MONO PCT % 6   EOS PCT % 0     Results from last 7 days   Lab Units 05/28/24  0824 05/27/24  1642   SODIUM mmol/L 134* 133*   POTASSIUM mmol/L 3.7 3.7   CHLORIDE mmol/L 101 100   CO2 mmol/L 23 23   BUN mg/dL 17 16   CREATININE mg/dL 0.60 0.75   ANION GAP mmol/L 10 10   CALCIUM mg/dL 8.4 8.8   ALBUMIN g/dL  --  3.8   TOTAL BILIRUBIN mg/dL  --  0.62   ALK PHOS U/L  --  108*   ALT U/L  --  23   AST U/L  --  24   GLUCOSE RANDOM mg/dL 145* 114     Results from last 7 days   Lab Units 05/27/24  1642   INR  1.29*             Results from last 7 days   Lab Units 05/27/24  1642   LACTIC ACID mmol/L 1.4   PROCALCITONIN ng/ml 0.19       Lines/Drains:  Invasive Devices       Peripheral Intravenous Line  Duration             Peripheral IV 05/27/24 Distal;Right;Upper;Ventral (anterior) Arm <1 day    Peripheral IV 05/27/24 Right Antecubital <1 day                      Telemetry:  Telemetry Orders (From admission, onward)               24 Hour Telemetry Monitoring  Continuous x 24 Hours (Telem)        Question:  Reason for 24 Hour Telemetry  Answer:  PCI/EP study (including pacer and ICD implementation), Cardiac surgery, MI, abnormal cardiac cath, and chest pain- rule out MI                     Telemetry Reviewed: NSVT intermittently  Indication for  Continued Telemetry Use: Acute MI/Unstable Angina/Rule out ACS             Imaging: No pertinent imaging reviewed.    Recent Cultures (last 7 days):   Results from last 7 days   Lab Units 05/28/24  0637 05/27/24  1642   BLOOD CULTURE   --  Received in Microbiology Lab. Culture in Progress.  Received in Microbiology Lab. Culture in Progress.   LEGIONELLA URINARY ANTIGEN  Negative  --        Last 24 Hours Medication List:   Current Facility-Administered Medications   Medication Dose Route Frequency Provider Last Rate    acetaminophen  650 mg Oral Q6H PRN Awa Hawk, CRNP      aspirin  81 mg Oral Daily Awa Hawk, CRNP      atorvastatin  80 mg Oral Daily With Dinner Awa Nitak, CRNP      benzonatate  100 mg Oral TID Awa Nitak, CRNP      cefTRIAXone  1,000 mg Intravenous Q24H Awa Nitak, CRNP 1,000 mg (05/28/24 0617)    co-enzyme Q-10  30 mg Oral TID Awa Hawk, CRNP      doxycycline hyclate  100 mg Oral Q12H Awa Nitak, CRNP      guaiFENesin  1,200 mg Oral Q12H DEVON Awa Nitak, CRNP      heparin (porcine)  3-20 Units/kg/hr (Order-Specific) Intravenous Titrated Awa Hawk, CRNP 16 Units/kg/hr (05/28/24 0928)    heparin (porcine)  1,650 Units Intravenous Q6H PRN Awa Hawk, CRNP      heparin (porcine)  3,300 Units Intravenous Q6H PRN Awa Hawk, CRNP      lidocaine  2 patch Topical Daily Awa Nitak, CRNP      metoprolol tartrate  25 mg Oral Q12H DEVON Awa Nitak, CRNP      pantoprazole  40 mg Oral Early Morning Awa Nitak, CRNP      sodium chloride  100 mL/hr Intravenous Continuous Cornellyn CAROL De Guzman      ticagrelor  90 mg Oral Q12H DEVON Awa Ottoniel, CRNP          Today, Patient Was Seen By: Devin Birch MD    **Please Note: This note may have been constructed using a voice recognition system.**

## 2024-05-28 NOTE — H&P
[facility]  H&P  Name: Nolbe Chou 78 y.o. female I MRN: [id number]  Unit/Bed#: S -01 I Date of Admission: 5/28/2024   Date of Service: 5/28/2024 I Hospital Day: 0      Assessment & Plan   * Elevated troponin  Assessment & Plan  Denies chest pain. Troponin [phone]  EKG: LBBB, PACs  Case reviewed by ED with interventional cardiology - transferred to [facility] for non emergent cath.  Loaded with Brilenta, ASA.  ACS heparin.  Increase statin   Start metoprolol 25 mg bid  A1c, lipid panel pending  Cardiology consulted     Pneumonia  Assessment & Plan  Presented to ED with abdominal pain, cough.  CT: RLL pneumonia. Small right pleural effusion. Patchy left sided opacities with endoluminal debris.  Consideration for aspiration.   Antibiotics   Completed Z vale as outpatietn  Cefepime, vancomycin in ED    Continue with ceftriaxone, doxycycline.  Tessalon TID, mucinex BID  Speech consult    Sepsis (HCC)  Assessment & Plan  Tachycardia, leukocytosis (recently completed course of prednisone on the weekend).  Lactate normal.  Procalcitonin normal.   Source: RLL pneumonia  Antibiotics: completed z vale as outpatient.  Cefepime, vancomycin in ED.   Continue ceftriaxone  Urine antigens, blood cultures pending    Primary hypertension  Assessment & Plan  On fosinopril as op, hold for now pending cardiology eval +/- cardiac cath  Started on metoprolol due to above    Compression deformity of vertebra  Assessment & Plan  Age indetermine L3 compression deformity   Has occasional mild low back pain, none currently.    Abnormal CT scan  Assessment & Plan  Indeterminate 1.3 left renal lesion.  Cannot distinguish hyperdenst cyst from solid neoplasm on single phase CT.    Nonemergent renal protocol or MRI  Abnormal appearance of uterus or vaginal cuff.  Soft tissue density surrounded by rim of fluid is indeterminate but presence of neoplasm cannot be excluded  Non emergent pelvic US  Consider imaging as  inpatient vs outpatient.  Patient is aware of above findings    Pure hypercholesterolemia  Assessment & Plan  Maintained on pravastatin as outpatient  Increase to atorvastatin 80 mg  Lipid panel pending           VTE Pharmacologic Prophylaxis: VTE Score: 6 High Risk (Score >/= 5) - Pharmacological DVT Prophylaxis Ordered: heparin drip. Sequential Compression Devices Ordered.  Code Status: Level 1 - Full Code full  Discussion with family: Patient declined call to .     Anticipated Length of Stay: Patient will be admitted on an inpatient basis with an anticipated length of stay of greater than 2 midnights secondary to cardiology evaluation, IV antibiotics.    Total Time Spent on Date of Encounter in care of patient:  mins. This time was spent on one or more of the following: performing physical exam; counseling and coordination of care; obtaining or reviewing history; documenting in the medical record; reviewing/ordering tests, medications or procedures; communicating with other healthcare professionals and discussing with patient's family/caregivers.    Chief Complaint: cough and abdominal pain    History of Present Illness:  Noble Chou is a 78 y.o. female with a PMH of HTN, HLD, osteoporosis, rios esophagus  who presented to [facility] ED with cough and abdominal pain.  Cough began 1.5 weeks ago.  Patient was seen at urgent care and diagnosed with pneumonia recently.  She completed course of prednisone and z vale over the weekend but has ongoing cough.   Patient met sepsis and was started on cefepime and vancomycin.      Patient was transferred to [facility] for troponin [phone].  EKG NSR, LBBB, PAC.  Case was reviewed by ED with interventional radiology who recommended transfer to cath capable facility for non emergent cath.  The patient denies any chest pain.  She was started on brilenta, aspirin, heparin drip.      The patient is generally quite active with the exception of the past 1.5 weeks.  She  attends water exercise classes twice weekly without episodes of chest pain or shortness of breath.    Patient's mother had AAA and  at age 94.      Review of Systems:  Review of Systems   Constitutional:  Fever: low grade temp on .   Respiratory:  Positive for cough and shortness of breath. Negative for wheezing.    Cardiovascular:  Negative for chest pain, palpitations and leg swelling.   Gastrointestinal:  Positive for abdominal pain.   All other systems reviewed and are negative.      Past Medical and Surgical History:   Past Medical History:   Diagnosis Date    Edmondson esophagus     Bundle-branch block     Cervical spine fracture (HCC)     Colon polyp     GERD (gastroesophageal reflux disease)     Hypertension     Post-nasal drip     causing cough    Pure hypercholesterolemia 2019       Past Surgical History:   Procedure Laterality Date    COLONOSCOPY  2017    5 yr recall    JOINT REPLACEMENT Right     hip    TIBIAL IM QUIRINO REMOVAL      TOTAL HIP ARTHROPLASTY      UPPER GASTROINTESTINAL ENDOSCOPY  10/18/2018    3 yr recall       Meds/Allergies:  Prior to Admission medications    Medication Sig Start Date End Date Taking? Authorizing Provider   co-enzyme Q-10 30 MG capsule Take 30 mg by mouth 3 (three) times a day    Historical Provider, MD   Cranberry 300 MG tablet Take 300 mg by mouth 2 (two) times a day    Historical Provider, MD   denosumab (PROLIA) 60 mg/mL Inject 60 mg under the skin once    Historical Provider, MD   fosinopril (MONOPRIL) 10 mg tablet Take 10 mg by mouth 2 (two) times a day  19   Historical Provider, MD   Multiple Vitamin (multivitamin) tablet Take 1 tablet by mouth daily    Historical Provider, MD   pantoprazole (PROTONIX) 40 mg tablet TAKE 1 TABLET BY MOUTH EVERY DAY 24   CAROL Gallegos   pravastatin (PRAVACHOL) 40 mg tablet Take 40 mg by mouth daily 19   Historical Provider, MD   TURMERIC PO Take by mouth    Historical Provider, MD   calcium  carbonate (OS-NATI) 600 MG tablet Take 600 mg by mouth 2 (two) times a day with meals  Patient not taking: No sig reported  10/3/22  Historical Provider, MD   polyethylene glycol (Golytely) 4000 mL solution Take 4,000 mL by mouth once for 1 dose Take 4000 mL by mouth once for 1 dose. Use as directed 6/8/22 10/3/22  CAROL Gallegos   triamcinolone (KENALOG) 0.1 % cream Apply topically 2 (two) times a day  Patient not taking: No sig reported 6/24/21 10/3/22  Aileen Rios PA-C   valACYclovir (VALTREX) 1,000 mg tablet Take 1 tablet (1,000 mg total) by mouth 3 (three) times a day for 7 days  Patient not taking: Reported on 6/8/2022 4/13/21 10/3/22  Aileen Rios PA-C     I have reviewed home medications with patient personally.    Allergies: No Known Allergies    Social History:  Marital Status: /Civil Union   Occupation:   Patient Pre-hospital Living Situation: Home  Patient Pre-hospital Level of Mobility: walks  Patient Pre-hospital Diet Restrictions:   Substance Use History:   Social History     Substance and Sexual Activity   Alcohol Use Never     Social History     Tobacco Use   Smoking Status Never    Passive exposure: Never   Smokeless Tobacco Never     Social History     Substance and Sexual Activity   Drug Use Never       Family History:  Family History   Problem Relation Age of Onset    Colon polyps Sister     Colon cancer Neg Hx        Physical Exam:     Vitals:   Blood Pressure: 119/70 (05/28/24 0326)  Pulse: 63 (05/28/24 0326)  Temperature: 97.8 °F (36.6 °C) (05/28/24 0326)  Respirations: 18 (05/28/24 0326)  SpO2: 92 % (05/28/24 0326)    Physical Exam  Constitutional:       Appearance: Normal appearance.   HENT:      Head: Normocephalic and atraumatic.      Right Ear: External ear normal.      Left Ear: External ear normal.      Nose: Nose normal.      Mouth/Throat:      Pharynx: Oropharynx is clear.   Eyes:      Extraocular Movements: Extraocular movements intact.       Conjunctiva/sclera: Conjunctivae normal.   Cardiovascular:      Rate and Rhythm: Normal rate and regular rhythm.      Pulses: Normal pulses.      Heart sounds: Normal heart sounds.   Pulmonary:      Breath sounds: Examination of the right-middle field reveals decreased breath sounds. Examination of the right-lower field reveals decreased breath sounds. Decreased breath sounds present.      Comments: Mild conversational dyspnea  Abdominal:      General: Bowel sounds are normal. There is no distension.      Palpations: Abdomen is soft.      Tenderness: There is abdominal tenderness (right uppwer). There is no right CVA tenderness, left CVA tenderness, guarding or rebound.   Musculoskeletal:         General: Normal range of motion.      Cervical back: Normal range of motion.   Skin:     General: Skin is warm.      Capillary Refill: Capillary refill takes less than 2 seconds.   Neurological:      General: No focal deficit present.      Mental Status: She is alert and oriented to person, place, and time.   Psychiatric:         Mood and Affect: Mood normal.         Behavior: Behavior normal.          Additional Data:     Lab Results:  Results from last 7 days   Lab Units 05/27/24  1642   WBC Thousand/uL 18.78*   HEMOGLOBIN g/dL 12.1   HEMATOCRIT % 38.6   PLATELETS Thousands/uL 415*   SEGS PCT % 81*   LYMPHO PCT % 9*   MONO PCT % 9   EOS PCT % 0     Results from last 7 days   Lab Units 05/27/24  1642   SODIUM mmol/L 133*   POTASSIUM mmol/L 3.7   CHLORIDE mmol/L 100   CO2 mmol/L 23   BUN mg/dL 16   CREATININE mg/dL 0.75   ANION GAP mmol/L 10   CALCIUM mg/dL 8.8   ALBUMIN g/dL 3.8   TOTAL BILIRUBIN mg/dL 0.62   ALK PHOS U/L 108*   ALT U/L 23   AST U/L 24   GLUCOSE RANDOM mg/dL 114     Results from last 7 days   Lab Units 05/27/24  1642   INR  1.29*             Results from last 7 days   Lab Units 05/27/24  1642   LACTIC ACID mmol/L 1.4   PROCALCITONIN ng/ml 0.19       Lines/Drains:  Invasive Devices       Peripheral  Intravenous Line  Duration             Peripheral IV 05/27/24 Distal;Right;Upper;Ventral (anterior) Arm <1 day    Peripheral IV 05/27/24 Right Antecubital <1 day                        Imaging: Reviewed radiology reports from this admission including: chest CT scan and abdominal/pelvic CT  No orders to display       EKG and Other Studies Reviewed on Admission:   EKG: NSR. , LBBB, PACs.    ** Please Note: This note has been constructed using a voice recognition system. **

## 2024-05-29 LAB
ANION GAP SERPL CALCULATED.3IONS-SCNC: 11 MMOL/L (ref 4–13)
APTT PPP: 76 SECONDS (ref 23–37)
BUN SERPL-MCNC: 18 MG/DL (ref 5–25)
CALCIUM SERPL-MCNC: 8 MG/DL (ref 8.4–10.2)
CHLORIDE SERPL-SCNC: 103 MMOL/L (ref 96–108)
CO2 SERPL-SCNC: 21 MMOL/L (ref 21–32)
CREAT SERPL-MCNC: 0.65 MG/DL (ref 0.6–1.3)
ERYTHROCYTE [DISTWIDTH] IN BLOOD BY AUTOMATED COUNT: 14.4 % (ref 11.6–15.1)
GFR SERPL CREATININE-BSD FRML MDRD: 84 ML/MIN/1.73SQ M
GLUCOSE SERPL-MCNC: 131 MG/DL (ref 65–140)
HCT VFR BLD AUTO: 34.9 % (ref 34.8–46.1)
HGB BLD-MCNC: 11.4 G/DL (ref 11.5–15.4)
MCH RBC QN AUTO: 27.6 PG (ref 26.8–34.3)
MCHC RBC AUTO-ENTMCNC: 32.7 G/DL (ref 31.4–37.4)
MCV RBC AUTO: 85 FL (ref 82–98)
PLATELET # BLD AUTO: 432 THOUSANDS/UL (ref 149–390)
PMV BLD AUTO: 9.2 FL (ref 8.9–12.7)
POTASSIUM SERPL-SCNC: 3.5 MMOL/L (ref 3.5–5.3)
PROCALCITONIN SERPL-MCNC: 0.25 NG/ML
RBC # BLD AUTO: 4.13 MILLION/UL (ref 3.81–5.12)
SODIUM SERPL-SCNC: 135 MMOL/L (ref 135–147)
WBC # BLD AUTO: 13.58 THOUSAND/UL (ref 4.31–10.16)

## 2024-05-29 PROCEDURE — 93454 CORONARY ARTERY ANGIO S&I: CPT | Performed by: INTERNAL MEDICINE

## 2024-05-29 PROCEDURE — C1769 GUIDE WIRE: HCPCS | Performed by: INTERNAL MEDICINE

## 2024-05-29 PROCEDURE — 99152 MOD SED SAME PHYS/QHP 5/>YRS: CPT | Performed by: INTERNAL MEDICINE

## 2024-05-29 PROCEDURE — 94760 N-INVAS EAR/PLS OXIMETRY 1: CPT

## 2024-05-29 PROCEDURE — 85027 COMPLETE CBC AUTOMATED: CPT | Performed by: NURSE PRACTITIONER

## 2024-05-29 PROCEDURE — 80048 BASIC METABOLIC PNL TOTAL CA: CPT | Performed by: NURSE PRACTITIONER

## 2024-05-29 PROCEDURE — 85730 THROMBOPLASTIN TIME PARTIAL: CPT | Performed by: STUDENT IN AN ORGANIZED HEALTH CARE EDUCATION/TRAINING PROGRAM

## 2024-05-29 PROCEDURE — 94664 DEMO&/EVAL PT USE INHALER: CPT

## 2024-05-29 PROCEDURE — 4A023N8 MEASUREMENT OF CARDIAC SAMPLING AND PRESSURE, BILATERAL, PERCUTANEOUS APPROACH: ICD-10-PCS | Performed by: INTERNAL MEDICINE

## 2024-05-29 PROCEDURE — 84145 PROCALCITONIN (PCT): CPT | Performed by: INTERNAL MEDICINE

## 2024-05-29 PROCEDURE — 99232 SBSQ HOSP IP/OBS MODERATE 35: CPT | Performed by: INTERNAL MEDICINE

## 2024-05-29 PROCEDURE — C1894 INTRO/SHEATH, NON-LASER: HCPCS | Performed by: INTERNAL MEDICINE

## 2024-05-29 PROCEDURE — B2111ZZ FLUOROSCOPY OF MULTIPLE CORONARY ARTERIES USING LOW OSMOLAR CONTRAST: ICD-10-PCS | Performed by: INTERNAL MEDICINE

## 2024-05-29 PROCEDURE — 99233 SBSQ HOSP IP/OBS HIGH 50: CPT | Performed by: PHYSICIAN ASSISTANT

## 2024-05-29 RX ORDER — LIDOCAINE HYDROCHLORIDE 10 MG/ML
INJECTION, SOLUTION EPIDURAL; INFILTRATION; INTRACAUDAL; PERINEURAL CODE/TRAUMA/SEDATION MEDICATION
Status: DISCONTINUED | OUTPATIENT
Start: 2024-05-29 | End: 2024-05-29 | Stop reason: HOSPADM

## 2024-05-29 RX ORDER — ALBUTEROL SULFATE 2.5 MG/3ML
2.5 SOLUTION RESPIRATORY (INHALATION) EVERY 6 HOURS PRN
Status: DISCONTINUED | OUTPATIENT
Start: 2024-05-29 | End: 2024-06-03 | Stop reason: HOSPADM

## 2024-05-29 RX ORDER — MIDAZOLAM HYDROCHLORIDE 2 MG/2ML
INJECTION, SOLUTION INTRAMUSCULAR; INTRAVENOUS CODE/TRAUMA/SEDATION MEDICATION
Status: DISCONTINUED | OUTPATIENT
Start: 2024-05-29 | End: 2024-05-29 | Stop reason: HOSPADM

## 2024-05-29 RX ORDER — VERAPAMIL HYDROCHLORIDE 2.5 MG/ML
INJECTION, SOLUTION INTRAVENOUS CODE/TRAUMA/SEDATION MEDICATION
Status: DISCONTINUED | OUTPATIENT
Start: 2024-05-29 | End: 2024-05-29 | Stop reason: HOSPADM

## 2024-05-29 RX ORDER — POTASSIUM CHLORIDE 20 MEQ/1
40 TABLET, EXTENDED RELEASE ORAL ONCE
Status: COMPLETED | OUTPATIENT
Start: 2024-05-29 | End: 2024-05-29

## 2024-05-29 RX ORDER — METOPROLOL TARTRATE 50 MG/1
50 TABLET, FILM COATED ORAL EVERY 8 HOURS
Status: DISCONTINUED | OUTPATIENT
Start: 2024-05-29 | End: 2024-05-31

## 2024-05-29 RX ORDER — FENTANYL CITRATE 50 UG/ML
INJECTION, SOLUTION INTRAMUSCULAR; INTRAVENOUS CODE/TRAUMA/SEDATION MEDICATION
Status: DISCONTINUED | OUTPATIENT
Start: 2024-05-29 | End: 2024-05-29 | Stop reason: HOSPADM

## 2024-05-29 RX ORDER — NITROGLYCERIN 20 MG/100ML
INJECTION INTRAVENOUS CODE/TRAUMA/SEDATION MEDICATION
Status: DISCONTINUED | OUTPATIENT
Start: 2024-05-29 | End: 2024-05-29 | Stop reason: HOSPADM

## 2024-05-29 RX ORDER — SODIUM CHLORIDE 9 MG/ML
50 INJECTION, SOLUTION INTRAVENOUS CONTINUOUS
Status: DISPENSED | OUTPATIENT
Start: 2024-05-29 | End: 2024-05-29

## 2024-05-29 RX ORDER — HEPARIN SODIUM 1000 [USP'U]/ML
INJECTION, SOLUTION INTRAVENOUS; SUBCUTANEOUS CODE/TRAUMA/SEDATION MEDICATION
Status: DISCONTINUED | OUTPATIENT
Start: 2024-05-29 | End: 2024-05-29 | Stop reason: HOSPADM

## 2024-05-29 RX ADMIN — POTASSIUM CHLORIDE 40 MEQ: 1500 TABLET, EXTENDED RELEASE ORAL at 11:37

## 2024-05-29 RX ADMIN — ASPIRIN 81 MG CHEWABLE TABLET 81 MG: 81 TABLET CHEWABLE at 08:44

## 2024-05-29 RX ADMIN — BENZONATATE 100 MG: 100 CAPSULE ORAL at 20:31

## 2024-05-29 RX ADMIN — LIDOCAINE 5% 2 PATCH: 700 PATCH TOPICAL at 08:46

## 2024-05-29 RX ADMIN — TICAGRELOR 90 MG: 90 TABLET ORAL at 08:44

## 2024-05-29 RX ADMIN — METOPROLOL TARTRATE 50 MG: 50 TABLET, FILM COATED ORAL at 08:44

## 2024-05-29 RX ADMIN — Medication 30 MG: at 17:27

## 2024-05-29 RX ADMIN — CEFTRIAXONE SODIUM 1000 MG: 10 INJECTION, POWDER, FOR SOLUTION INTRAVENOUS at 06:40

## 2024-05-29 RX ADMIN — Medication 30 MG: at 20:31

## 2024-05-29 RX ADMIN — GUAIFENESIN 1200 MG: 600 TABLET ORAL at 20:31

## 2024-05-29 RX ADMIN — METOPROLOL TARTRATE 50 MG: 50 TABLET, FILM COATED ORAL at 17:26

## 2024-05-29 RX ADMIN — BENZONATATE 100 MG: 100 CAPSULE ORAL at 08:44

## 2024-05-29 RX ADMIN — SODIUM CHLORIDE 50 ML/HR: 0.9 INJECTION, SOLUTION INTRAVENOUS at 14:40

## 2024-05-29 RX ADMIN — ATORVASTATIN CALCIUM 80 MG: 40 TABLET, FILM COATED ORAL at 17:27

## 2024-05-29 RX ADMIN — BENZONATATE 100 MG: 100 CAPSULE ORAL at 17:27

## 2024-05-29 RX ADMIN — SODIUM CHLORIDE 100 ML/HR: 0.9 INJECTION, SOLUTION INTRAVENOUS at 07:20

## 2024-05-29 NOTE — ASSESSMENT & PLAN NOTE
Maintained on pravastatin as outpatient  Increase to atorvastatin 80 mg  Lipid panel ordered  HDL mildly low, rest of lipid panel at goal

## 2024-05-29 NOTE — PLAN OF CARE
Problem: PAIN - ADULT  Goal: Verbalizes/displays adequate comfort level or baseline comfort level  Description: Interventions:  - Encourage patient to monitor pain and request assistance  - Assess pain using appropriate pain scale  - Administer analgesics based on type and severity of pain and evaluate response  - Implement non-pharmacological measures as appropriate and evaluate response  - Consider cultural and social influences on pain and pain management  - Notify physician/advanced practitioner if interventions unsuccessful or patient reports new pain  Outcome: Progressing     Problem: INFECTION - ADULT  Goal: Absence or prevention of progression during hospitalization  Description: INTERVENTIONS:  - Assess and monitor for signs and symptoms of infection  - Monitor lab/diagnostic results  - Monitor all insertion sites, i.e. indwelling lines, tubes, and drains  - Monitor endotracheal if appropriate and nasal secretions for changes in amount and color  - [place] appropriate cooling/warming therapies per order  - Administer medications as ordered  - Instruct and encourage patient and family to use good hand hygiene technique  - Identify and instruct in appropriate isolation precautions for identified infection/condition  Outcome: Progressing  Goal: Absence of fever/infection during neutropenic period  Description: INTERVENTIONS:  - Monitor WBC    Outcome: Progressing     Problem: SAFETY ADULT  Goal: Patient will remain free of falls  Description: INTERVENTIONS:  - Educate patient/family on patient safety including physical limitations  - Instruct patient to call for assistance with activity   - Consult OT/PT to assist with strengthening/mobility   - Keep Call bell within reach  - Keep bed low and locked with side rails adjusted as appropriate  - Keep care items and personal belongings within reach  - Initiate and maintain comfort rounds  - Make Fall Risk Sign visible to staff  - Apply yellow socks and bracelet  for high fall risk patients  - Consider moving patient to room near nurses station  Outcome: Progressing  Goal: Maintain or return to baseline ADL function  Description: INTERVENTIONS:  -  Assess patient's ability to carry out ADLs; assess patient's baseline for ADL function and identify physical deficits which impact ability to perform ADLs (bathing, care of mouth/teeth, toileting, grooming, dressing, etc.)  - Assess/evaluate cause of self-care deficits   - Assess range of motion  - Assess patient's mobility; develop plan if impaired  - Assess patient's need for assistive devices and provide as appropriate  - Encourage maximum independence but intervene and supervise when necessary  - Involve family in performance of ADLs  - Assess for home care needs following discharge   - Consider OT consult to assist with ADL evaluation and planning for discharge  - Provide patient education as appropriate  Outcome: Progressing  Goal: Maintains/Returns to pre admission functional level  Description: INTERVENTIONS:  - Perform AM-PAC 6 Click Basic Mobility/ Daily Activity assessment daily.  - Set and communicate daily mobility goal to care team and patient/family/caregiver.   - Collaborate with rehabilitation services on mobility goals if consulted  - Out of bed for toileting  - Record patient progress and toleration of activity level   Outcome: Progressing     Problem: DISCHARGE PLANNING  Goal: Discharge to home or other facility with appropriate resources  Description: INTERVENTIONS:  - Identify barriers to discharge w/patient and caregiver  - Arrange for needed discharge resources and transportation as appropriate  - Identify discharge learning needs (meds, wound care, etc.)  - Arrange for interpretive services to assist at discharge as needed  - Refer to Case Management Department for coordinating discharge planning if the patient needs post-hospital services based on physician/advanced practitioner order or complex needs  related to functional status, cognitive ability, or social support system  Outcome: Progressing     Problem: Knowledge Deficit  Goal: Patient/family/caregiver demonstrates understanding of disease process, treatment plan, medications, and discharge instructions  Description: Complete learning assessment and assess knowledge base.  Interventions:  - Provide teaching at level of understanding  - Provide teaching via preferred learning methods  Outcome: Progressing     Problem: Nutrition/Hydration-ADULT  Goal: Nutrient/Hydration intake appropriate for improving, restoring or maintaining nutritional needs  Description: Monitor and assess patient's nutrition/hydration status for malnutrition. Collaborate with interdisciplinary team and initiate plan and interventions as ordered.  Monitor patient's weight and dietary intake as ordered or per policy. Utilize nutrition screening tool and intervene as necessary. Determine patient's food preferences and provide high-protein, high-caloric foods as appropriate.     INTERVENTIONS:  - Monitor oral intake, urinary output, labs, and treatment plans  - Assess nutrition and hydration status and recommend course of action  - Evaluate amount of meals eaten  - Assist patient with eating if necessary   - Allow adequate time for meals  - Recommend/ encourage appropriate diets, oral nutritional supplements, and vitamin/mineral supplements  - Order, calculate, and assess calorie counts as needed  - Recommend, monitor, and adjust tube feedings and TPN/PPN based on assessed needs  - Assess need for intravenous fluids  - Provide specific nutrition/hydration education as appropriate  - Include patient/family/caregiver in decisions related to nutrition  Outcome: Progressing

## 2024-05-29 NOTE — PLAN OF CARE
Problem: PAIN - ADULT  Goal: Verbalizes/displays adequate comfort level or baseline comfort level  Description: Interventions:  - Encourage patient to monitor pain and request assistance  - Assess pain using appropriate pain scale  - Administer analgesics based on type and severity of pain and evaluate response  - Implement non-pharmacological measures as appropriate and evaluate response  - Consider cultural and social influences on pain and pain management  - Notify physician/advanced practitioner if interventions unsuccessful or patient reports new pain  5/29/2024 0957 by Fe Rivera  Outcome: Progressing  5/29/2024 0956 by Fe Rivera  Outcome: Progressing     Problem: INFECTION - ADULT  Goal: Absence or prevention of progression during hospitalization  Description: INTERVENTIONS:  - Assess and monitor for signs and symptoms of infection  - Monitor lab/diagnostic results  - Monitor all insertion sites, i.e. indwelling lines, tubes, and drains  - Monitor endotracheal if appropriate and nasal secretions for changes in amount and color  - [place] appropriate cooling/warming therapies per order  - Administer medications as ordered  - Instruct and encourage patient and family to use good hand hygiene technique  - Identify and instruct in appropriate isolation precautions for identified infection/condition  5/29/2024 0957 by Fe Rivera  Outcome: Progressing  5/29/2024 0956 by Fe Rivera  Outcome: Progressing  Goal: Absence of fever/infection during neutropenic period  Description: INTERVENTIONS:  - Monitor WBC    5/29/2024 0957 by Fe Rivera  Outcome: Progressing  5/29/2024 0956 by Fe Rivera  Outcome: Progressing     Problem: SAFETY ADULT  Goal: Patient will remain free of falls  Description: INTERVENTIONS:  - Educate patient/family on patient safety including physical limitations  - Instruct patient to call for assistance with activity   - Consult OT/PT to assist with  strengthening/mobility   - Keep Call bell within reach  - Keep bed low and locked with side rails adjusted as appropriate  - Keep care items and personal belongings within reach  - Initiate and maintain comfort rounds  - Make Fall Risk Sign visible to staff  - Apply yellow socks and bracelet for high fall risk patients  - Consider moving patient to room near nurses station  5/29/2024 0957 by Fe Rivera  Outcome: Progressing  5/29/2024 0956 by Fe Rivera  Outcome: Progressing  Goal: Maintain or return to baseline ADL function  Description: INTERVENTIONS:  -  Assess patient's ability to carry out ADLs; assess patient's baseline for ADL function and identify physical deficits which impact ability to perform ADLs (bathing, care of mouth/teeth, toileting, grooming, dressing, etc.)  - Assess/evaluate cause of self-care deficits   - Assess range of motion  - Assess patient's mobility; develop plan if impaired  - Assess patient's need for assistive devices and provide as appropriate  - Encourage maximum independence but intervene and supervise when necessary  - Involve family in performance of ADLs  - Assess for home care needs following discharge   - Consider OT consult to assist with ADL evaluation and planning for discharge  - Provide patient education as appropriate  5/29/2024 0957 by Fe Rivera  Outcome: Progressing  5/29/2024 0956 by Fe Rivera  Outcome: Progressing  Goal: Maintains/Returns to pre admission functional level  Description: INTERVENTIONS:  - Perform AM-PAC 6 Click Basic Mobility/ Daily Activity assessment daily.  - Set and communicate daily mobility goal to care team and patient/family/caregiver.   - Collaborate with rehabilitation services on mobility goals if consulted  - Out of bed for toileting  - Record patient progress and toleration of activity level   5/29/2024 0957 by Fe Rivera  Outcome: Progressing  5/29/2024 0956 by Fe Rivera  Outcome: Progressing      Problem: DISCHARGE PLANNING  Goal: Discharge to home or other facility with appropriate resources  Description: INTERVENTIONS:  - Identify barriers to discharge w/patient and caregiver  - Arrange for needed discharge resources and transportation as appropriate  - Identify discharge learning needs (meds, wound care, etc.)  - Arrange for interpretive services to assist at discharge as needed  - Refer to Case Management Department for coordinating discharge planning if the patient needs post-hospital services based on physician/advanced practitioner order or complex needs related to functional status, cognitive ability, or social support system  5/29/2024 0957 by Fe Rivera  Outcome: Progressing  5/29/2024 0956 by Fe Rivera  Outcome: Progressing     Problem: Knowledge Deficit  Goal: Patient/family/caregiver demonstrates understanding of disease process, treatment plan, medications, and discharge instructions  Description: Complete learning assessment and assess knowledge base.  Interventions:  - Provide teaching at level of understanding  - Provide teaching via preferred learning methods  5/29/2024 0957 by Fe Rivera  Outcome: Progressing  5/29/2024 0956 by Fe Rivera  Outcome: Progressing     Problem: Nutrition/Hydration-ADULT  Goal: Nutrient/Hydration intake appropriate for improving, restoring or maintaining nutritional needs  Description: Monitor and assess patient's nutrition/hydration status for malnutrition. Collaborate with interdisciplinary team and initiate plan and interventions as ordered.  Monitor patient's weight and dietary intake as ordered or per policy. Utilize nutrition screening tool and intervene as necessary. Determine patient's food preferences and provide high-protein, high-caloric foods as appropriate.     INTERVENTIONS:  - Monitor oral intake, urinary output, labs, and treatment plans  - Assess nutrition and hydration status and recommend course of action  - Evaluate  amount of meals eaten  - Assist patient with eating if necessary   - Allow adequate time for meals  - Recommend/ encourage appropriate diets, oral nutritional supplements, and vitamin/mineral supplements  - Order, calculate, and assess calorie counts as needed  - Recommend, monitor, and adjust tube feedings and TPN/PPN based on assessed needs  - Assess need for intravenous fluids  - Provide specific nutrition/hydration education as appropriate  - Include patient/family/caregiver in decisions related to nutrition  5/29/2024 0957 by Fe Rivera  Outcome: Progressing  5/29/2024 0956 by Fe Rivera  Outcome: Progressing

## 2024-05-29 NOTE — PROGRESS NOTES
formerly [facility]  Progress Note  Name: Noble Chou I  MRN: [id number]  Unit/Bed#: AN CATH LAB ROOM I Date of Admission: 5/28/2024   Date of Service: 5/29/2024 I Hospital Day: 1    Assessment & Plan   * Elevated troponin  Assessment & Plan  Denies chest pain. Troponin [phone]  EKG: LBBB, PACs  Case reviewed by ED with interventional cardiology - transferred to [facility] for non emergent cath.  Loaded with Brilenta, ASA.  ACS heparin.  Increase statin from pravastatin to lipitor 80  Start metoprolol 25 mg bid  A1c pending  Plan for left heart cath today    Sepsis (HCC)  Assessment & Plan  Tachycardia, leukocytosis (recently completed course of prednisone on the weekend).  Lactate normal.  Procalcitonin normal.   Source: RLL pneumonia (suspect possible viral)  Antibiotics: completed z vale as outpatient.  Cefepime, vancomycin in ED.   Continue ceftriaxone  Urine antigens negative  blood cultures NGTD    Pneumonia  Assessment & Plan  Presented to ED with abdominal pain, cough.  CT: RLL pneumonia. Small right pleural effusion. Patchy left sided opacities with endoluminal debris.  Consideration for aspiration.   Antibiotics   Completed Z vale as outpatient  Cefepime, vancomycin in ED    Continue with ceftriaxone, doxycycline - doxy dc with normal urinary antigens  Tessalon TID, mucinex BID  Speech consult - cannot be completed until cardiac cath done given she is NPO for this  Pulm consulted with patient having significant rhonchi on exam and complaints of worsening cough  Respiratory protocol placed - patient declined breathing treatments    Abnormal CT scan  Assessment & Plan  Indeterminate 1.3 left renal lesion.  Cannot distinguish hyperdenst cyst from solid neoplasm on single phase CT.    Nonemergent renal protocol or MRI  Abnormal appearance of uterus or vaginal cuff.  Soft tissue density surrounded by rim of fluid is indeterminate but presence of neoplasm cannot be excluded  Non emergent pelvic  US  Consider imaging as inpatient vs outpatient.  Patient is aware of above findings    Primary hypertension  Assessment & Plan  On fosinopril as op, hold for now pending cardiology eval +/- cardiac cath  Started on metoprolol due to above    Compression deformity of vertebra  Assessment & Plan  Age indetermine L3 compression deformity   Has occasional mild low back pain, none currently.    Pure hypercholesterolemia  Assessment & Plan  Maintained on pravastatin as outpatient  Increase to atorvastatin 80 mg  Lipid panel ordered  HDL mildly low, rest of lipid panel at goal               VTE Pharmacologic Prophylaxis: VTE Score: 6 High Risk (Score >/= 5) - Pharmacological DVT Prophylaxis Ordered: heparin drip. Sequential Compression Devices Ordered.    Mobility:   Basic Mobility Inpatient Raw Score: 18  JH-HLM Goal: 6: Walk 10 steps or more  JH-HLM Achieved: 6: Walk 10 steps or more  JH-HLM Goal achieved. Continue to encourage appropriate mobility.    Patient Centered Rounds: I performed bedside rounds with nursing staff today.   Discussions with Specialists or Other Care Team Provider: cardiology, RT    Education and Discussions with Family / Patient: Updated  (son) at bedside.    Total Time Spent on Date of Encounter in care of patient: 55 mins. This time was spent on one or more of the following: performing physical exam; counseling and coordination of care; obtaining or reviewing history; documenting in the medical record; reviewing/ordering tests, medications or procedures; communicating with other healthcare professionals and discussing with patient's family/caregivers.    Current Length of Stay: 1 day(s)  Current Patient Status: Inpatient   Certification Statement: The patient will continue to require additional inpatient hospital stay due to left heart cath, pulm consult  Discharge Plan: Anticipate discharge in 24-48 hrs to home.    Code Status: Level 1 - Full Code    Subjective:   Patient wishing  to go home today. Discussed at length with son at bedside the plan for today and plan for her cough. They are agreeable with RT and pulm jaja.     Objective:     Vitals:   Temp (24hrs), Av.2 °F (36.8 °C), Min:97.8 °F (36.6 °C), Max:98.7 °F (37.1 °C)    Temp:  [97.8 °F (36.6 °C)-98.7 °F (37.1 °C)] 97.9 °F (36.6 °C)  HR:  [63-87] 71  Resp:  [18] 18  BP: (114-137)/(64-80) 137/73  SpO2:  [90 %-95 %] 94 %  Body mass index is 23.98 kg/m².     Input and Output Summary (last 24 hours):     Intake/Output Summary (Last 24 hours) at 2024 1408  Last data filed at 2024 1300  Gross per 24 hour   Intake 360 ml   Output 200 ml   Net 160 ml       Physical Exam:   Physical Exam  Vitals and nursing note reviewed.   Constitutional:       General: She is not in acute distress.     Appearance: She is ill-appearing.   HENT:      Head: Normocephalic and atraumatic.   Cardiovascular:      Rate and Rhythm: Normal rate.      Pulses: Normal pulses.   Pulmonary:      Effort: Pulmonary effort is normal.      Breath sounds: Rhonchi present.   Abdominal:      General: Bowel sounds are normal.      Tenderness: There is no abdominal tenderness. There is no guarding or rebound.   Musculoskeletal:      Right lower leg: No edema.      Left lower leg: No edema.   Skin:     General: Skin is warm and dry.   Neurological:      Mental Status: She is alert.   Psychiatric:         Mood and Affect: Mood normal.         Behavior: Behavior normal.          Additional Data:     Labs:  Results from last 7 days   Lab Units 24  0549 24  0824   WBC Thousand/uL 13.58* 12.77*   HEMOGLOBIN g/dL 11.4* 11.6   HEMATOCRIT % 34.9 35.6   PLATELETS Thousands/uL 432* 399*   SEGS PCT %  --  84*   LYMPHO PCT %  --  9*   MONO PCT %  --  6   EOS PCT %  --  0     Results from last 7 days   Lab Units 24  0549 24  0824 24  1642   SODIUM mmol/L 135   < > 133*   POTASSIUM mmol/L 3.5   < > 3.7   CHLORIDE mmol/L 103   < > 100   CO2 mmol/L 21   <  > 23   BUN mg/dL 18   < > 16   CREATININE mg/dL 0.65   < > 0.75   ANION GAP mmol/L 11   < > 10   CALCIUM mg/dL 8.0*   < > 8.8   ALBUMIN g/dL  --   --  3.8   TOTAL BILIRUBIN mg/dL  --   --  0.62   ALK PHOS U/L  --   --  108*   ALT U/L  --   --  23   AST U/L  --   --  24   GLUCOSE RANDOM mg/dL 131   < > 114    < > = values in this interval not displayed.     Results from last 7 days   Lab Units 05/27/24  1642   INR  1.29*             Results from last 7 days   Lab Units 05/29/24  0549 05/27/24  1642   LACTIC ACID mmol/L  --  1.4   PROCALCITONIN ng/ml 0.25 0.19       Lines/Drains:  Invasive Devices       Peripheral Intravenous Line  Duration             Peripheral IV 05/27/24 Distal;Right;Upper;Ventral (anterior) Arm 1 day    Peripheral IV 05/27/24 Right Antecubital 1 day                      Telemetry:  Telemetry Orders (From admission, onward)               24 Hour Telemetry Monitoring  Continuous x 24 Hours (Telem)        Question:  Reason for 24 Hour Telemetry  Answer:  PCI/EP study (including pacer and ICD implementation), Cardiac surgery, MI, abnormal cardiac cath, and chest pain- rule out MI                     Telemetry Reviewed: NSVT otherwise, NSR  Indication for Continued Telemetry Use: Awaiting PCI/EP Study/CABG             Imaging: Personally reviewed the following imaging: chest CT scan    Recent Cultures (last 7 days):   Results from last 7 days   Lab Units 05/28/24  0637 05/27/24  1642   BLOOD CULTURE   --  No Growth at 24 hrs.  No Growth at 24 hrs.   LEGIONELLA URINARY ANTIGEN  Negative  --        Last 24 Hours Medication List:   Current Facility-Administered Medications   Medication Dose Route Frequency Provider Last Rate    [Transfer Hold] acetaminophen  650 mg Oral Q6H PRN CAROL Narvaez      [Transfer Hold] albuterol  2.5 mg Nebulization Q6H PRN Brannon Wakefield MD      amiodarone (CORDARONE) 900 mg in dextrose 5 % 500 mL infusion  0.5 mg/min Intravenous Continuous Mariah Allred MD 0.5  mg/min (05/29/24 0719)    [Transfer Hold] aspirin  81 mg Oral Daily CAROL Narvaez      [Transfer Hold] atorvastatin  80 mg Oral Daily With Dinner CAROL Narvaez      [Transfer Hold] benzonatate  100 mg Oral TID CAROL Narvaez      [Transfer Hold] cefTRIAXone  1,000 mg Intravenous Q24H CAROL Narvaez 1,000 mg (05/29/24 0640)    [Transfer Hold] co-enzyme Q-10  30 mg Oral TID CAROL Narvaez      [Transfer Hold] guaiFENesin  1,200 mg Oral Q12H DEVON CAROL Navraez      heparin (porcine)  3-20 Units/kg/hr (Order-Specific) Intravenous Titrated CAROL Narvaez Stopped (05/29/24 1349)    [Transfer Hold] heparin (porcine)  1,650 Units Intravenous Q6H PRN CAROL Narvaez      [Transfer Hold] heparin (porcine)  3,300 Units Intravenous Q6H PRN CAROL Narvaez      [Transfer Hold] lidocaine  2 patch Topical Daily CAROL Narvaez      [Transfer Hold] metoprolol tartrate  50 mg Oral Q12H [facility] Devin Birch MD      [Transfer Hold] pantoprazole  40 mg Oral Early Morning CAROL Narvaez      sodium chloride  100 mL/hr Intravenous Continuous CAROL Harris 100 mL/hr (05/29/24 0720)    [Transfer Hold] ticagrelor  90 mg Oral Q12H [facility] CAROL Narvaez          Today, Patient Was Seen By: Naomi Oglesby PA-C    **Please Note: This note may have been constructed using a voice recognition system.**

## 2024-05-29 NOTE — DISCHARGE INSTR - AVS FIRST PAGE
1. Please see the post cardiac catheterization dishcarge instructions.     2.Remove band aid.  Shower and wash area- wrist gently with soap and water. Rinse and pat dry.  Apply new water seal band aid.  Repeat this process for 1 days. No powders, creams lotions or antibiotic ointments  for 1 days.  No tub baths, hot tubs or swimming for 1 days.     3. Please call our office ([phone]) if you have any fever, redness, swelling, discharge from your wrist access site.

## 2024-05-29 NOTE — ASSESSMENT & PLAN NOTE
Denies chest pain. Troponin [phone]  EKG: LBBB, PACs  Case reviewed by ED with interventional cardiology - transferred to [facility] for non emergent cath.  Loaded with Brilenta, ASA.  ACS heparin.  Increase statin from pravastatin to lipitor 80  Start metoprolol 25 mg bid  A1c pending  Plan for left heart cath today

## 2024-05-29 NOTE — ASSESSMENT & PLAN NOTE
Presented to ED with abdominal pain, cough.  CT: RLL pneumonia. Small right pleural effusion. Patchy left sided opacities with endoluminal debris.  Consideration for aspiration.   Antibiotics   Completed Z vale as outpatient  Cefepime, vancomycin in ED    Continue with ceftriaxone, doxycycline - doxy dc with normal urinary antigens  Tessalon TID, mucinex BID  Speech consult - cannot be completed until cardiac cath done given she is NPO for this  Pulm consulted with patient having significant rhonchi on exam and complaints of worsening cough  Respiratory protocol placed - patient declined breathing treatments

## 2024-05-29 NOTE — CASE MANAGEMENT
Case Management Assessment & Discharge Planning Note    Patient name Noble Chou  Location S /S -01 MRN [id number]  : 1945 Date 2024       Current Admission Date: 2024  Current Admission Diagnosis:Elevated troponin   Patient Active Problem List    Diagnosis Date Noted Date Diagnosed    Primary hypertension 2024     Elevated troponin 2024     Sepsis (HCC) 2024     Pneumonia 2024     Abnormal CT scan 2024     Compression deformity of vertebra 2024     Bundle-branch block      Gastroesophageal reflux disease with esophagitis without hemorrhage 2020     History of colon polyps 2020     History of total right hip replacement 2019     Right upper quadrant abdominal pain 2019     Edmondson's esophagus without dysplasia 2019     Constipation 2019     Pure hypercholesterolemia 2019       LOS (days): 1  Geometric Mean LOS (GMLOS) (days): 3.7  Days to GMLOS:2     OBJECTIVE:    Risk of Unplanned Readmission Score: 11.67         Current admission status: Inpatient       Preferred Pharmacy:   [id number] [place] - [address]  [address] [address] [facility] [address]  Phone: [phone] Fax: [phone]    [id number] - [facility] [place] - [address]  [address] [facility] [facility] [address]  Phone: [phone] Fax: [phone]    Primary Care Provider: Angela Phelps MD    Primary Insurance: [employer]  Secondary Insurance:     ASSESSMENT:  Active Health Care Proxies    There are no active Health Care Proxies on file.                 Readmission Root Cause  30 Day Readmission: No    Patient Information  Admitted from:: Home  Mental Status: Alert  During Assessment patient was accompanied by: Spouse  Assessment information provided by:: Patient, Spouse  Primary Caregiver: Self  Support Systems: Spouse/significant other, Self  [place] Residence: [facility]  do you live in?: [place]  Home entry access options. Select all that apply.: Stairs  Number of steps to enter home.: 2  Type of Current Residence: 2 story home  Upon entering residence, is there a bedroom on the main floor (no further steps)?: No  A bedroom is located on the following floor levels of residence (select all that apply):: 2nd Floor  Upon entering residence, is there a bathroom on the main floor (no further steps)?: Yes  Number of steps to 2nd floor from main floor: One Flight  Living Arrangements: Lives w/ Spouse/significant other    Activities of Daily Living Prior to Admission  Functional Status: Independent  Completes ADLs independently?: Yes  Ambulates independently?: Yes  Does patient use assisted devices?: No  Does patient currently own DME?: Yes  What DME does the patient currently own?: Walker, Straight Cane  Does patient have a history of Outpatient Therapy (PT/OT)?: Yes  Does the patient have a history of Short-Term Rehab?: No  Does patient have a history of HHC?: Yes  Does patient currently have HHC?: No         Patient Information Continued  Income Source: Pension/alf  Does patient have prescription coverage?: Yes  Does patient receive dialysis treatments?: No  Does patient have a history of substance abuse?: No  Does patient have a history of Mental Health Diagnosis?: No    PHQ 2/9 Screening   Reviewed PHQ 2/9 Depression Screening Score?: No    Means of Transportation  Means of Transport to Appts:: Drives Self      Social Determinants of Health (SDOH)      Flowsheet Row Most Recent Value   Housing Stability    In the last 12 months, was there a time when you were not able to pay the mortgage or rent on time? N   At any time in the past 12 months, were you homeless or living in a shelter (including now)? N   Transportation Needs    In the past 12 months, has lack of transportation kept you from medical appointments or from getting medications? no   In the past 12 months, has  lack of transportation kept you from meetings, work, or from getting things needed for daily living? No   Food Insecurity    Within the past 12 months, you worried that your food would run out before you got the money to buy more. Never true   Within the past 12 months, the food you bought just didn't last and you didn't have money to get more. Never true   Utilities    In the past 12 months has the electric, gas, oil, or water company threatened to shut off services in your home? No            DISCHARGE DETAILS:    Discharge planning discussed with:: Patient, spouse  Freedom of Choice: Yes  Comments - Freedom of Choice: Pt reported preference of returning home  CM contacted family/caregiver?: Yes (Spouse present at bedside)  Were Treatment Team discharge recommendations reviewed with patient/caregiver?: Yes  Did patient/caregiver verbalize understanding of patient care needs?: Yes  Were patient/caregiver advised of the risks associated with not following Treatment Team discharge recommendations?: Yes    Contacts  Patient Contacts: Noble  Contact Method: In Person  Reason/Outcome: Continuity of Care, Discharge Planning    Requested Home Health Care         Is the patient interested in HHC at discharge?: No    DME Referral Provided  Referral made for DME?: No    Other Referral/Resources/Interventions Provided:  Interventions: Other (Specify)  Referral Comments: CM spoke with pt and spouse at bedside, introduced self and role with discharge planning. Pt reported she lives with her spouse in a 2 story home with 2 DREW. Pt reported bathroom is downstairs. Pt reported she was IPTA without AD. Pt reported she has a cane and rw, was not on O2. Pt reported she was driving self to appointments.    Would you like to participate in our [facility] service program?  : No - Declined    Treatment Team Recommendation: Home  Discharge Destination Plan:: Home

## 2024-05-29 NOTE — RESPIRATORY THERAPY NOTE
"RT Protocol Note  Noble Chou 78 y.o. female MRN: [id number]  Unit/Bed#: S -01 Encounter: [id number]    Assessment    Principal Problem:    Elevated troponin  Active Problems:    Pure hypercholesterolemia    Sepsis (HCC)    Pneumonia    Abnormal CT scan    Compression deformity of vertebra    Primary hypertension      Home Pulmonary Medications:  none  Home Devices/Therapy: Other (Comment) (none)    Past Medical History:   Diagnosis Date    Edmondson esophagus     Bundle-branch block     Cervical spine fracture (HCC)     Colon polyp     GERD (gastroesophageal reflux disease)     Hypertension     Post-nasal drip     causing cough    Pure hypercholesterolemia 07/01/2019       Objective    Physical Exam:   Assessment Type: Assess only  General Appearance: Alert, Awake  Respiratory Pattern: Dyspnea at rest  Chest Assessment: Chest expansion symmetrical  R Breath Sounds: Coarse  L Breath Sounds: Diminished  O2 Device: RA    Vitals:  Blood pressure 137/73, pulse 71, temperature 97.9 °F (36.6 °C), resp. rate 18, height 5' 1.75\" (1.568 m), weight 59 kg (130 lb 1.1 oz), SpO2 94%.          Imaging and other studies: I have personally reviewed pertinent reports.      O2 Device: RA     Plan    Respiratory Plan: No distress/Pulmonary history  Airway Clearance Plan: Flutter     Resp Comments: Assessed patient per protocol, patient denies home nebulizer use, bipap/cpap, home oxygen. Requested by resident to see patient for coarse/rhonchi BS. BBS diminished BS on the left in the upper lobs and coarse on the right. Patient does have a weak, nonproductive cough. Offered patient nebulizer treatments however did not want something that would make her cough more. Patient felt like the mucinex and tessalon pearls helped her more but she is currerntly NPO for her procedure. Told patient will order PRN and she can decide after her procedure today. Gave patient flutter however patient not feeling the best to try it.   "

## 2024-05-29 NOTE — PROGRESS NOTES
General Cardiology   Progress Note -  Team One   Noble Chou 78 y.o. female MRN: [id number]    Unit/Bed#: S -01 Encounter: [id number]    Assessment/ Plan    Elevated troponin   HS troponin 754-->2,517-->2,556-->1,206  No complaint of chest pain or SOB at rest or with exertion   Echocardiogram yesterday showed EF 30%, mild concentric hypertrophy, grade I diastolic dysfunction, akinetic basal anteroseptal, mid anteroseptal, mid inferoseptal, mid inferior, apical anterior, apical septal, apical inferior, apical lateral and apex   Reviewed ECG showing LBBB (patient reports history of BBB)  Continue heparin gtt   Loaded with aspirin and Brilinta 5/27/2024  On DAPT: aspirin and brilinta, atorvastatin 80 mg PO daily and metoprolol 50 mg PO BID   Plan for cardiac cath today   Creatine stable: 0.65    2. NSVT  Reviewed telemetry showing NSR with occasional NSVT   Goal keep K > 4.0 and Mg >2.0   K 3.5 today. Will replete   Started on amiodarone gtt yesterday afternoon due to frequency of NSVT     3. Hypertension   BP stable: 127/72  She takes fosinopril 10 mg PO BID at home prior to admission   Currently on metoprolol 50 mg PO BID   Continue to monitor      4. Pneumonia   On antibiotic: ceftriaxone   On tessalon pearls   Blood cultures pending, no growth after 24 hours   Followed by primary team      5. Hyperlipidemia   On pravastatin 40 mg PO daily at home  Currently on atorvastatin 80 mg PO daily     Subjective  Patient resting in bed. She reports she continues with coughing. No chest pain, palpitations or SOB.     Review of Systems   Constitutional: Positive for decreased appetite and malaise/fatigue. Negative for chills and fever.   HENT:  Negative for congestion.    Cardiovascular:  Negative for chest pain, dyspnea on exertion, leg swelling, orthopnea and palpitations.   Respiratory:  Positive for cough.    Musculoskeletal:  Negative for falls.   Gastrointestinal:  Negative for nausea and vomiting.  "  Psychiatric/Behavioral:  Negative for altered mental status.    All other systems reviewed and are negative.      Objective:   Vitals: Blood pressure 137/73, pulse 71, temperature 97.9 °F (36.6 °C), resp. rate 18, height 5' 1.75\" (1.568 m), weight 59 kg (130 lb 1.1 oz), SpO2 93%.,       Body mass index is 23.98 kg/m².,     Systolic (24hrs), Av , Min:114 , Max:137     Diastolic (24hrs), Av, Min:64, Max:80      Intake/Output Summary (Last 24 hours) at 2024 1043  Last data filed at 2024 1900  Gross per 24 hour   Intake 360 ml   Output 200 ml   Net 160 ml     Weight (last 2 days)       Date/Time Weight    24 1030 59 (130.07)          Telemetry Review: Normal sinus rhythm with occasional NSVT       Physical Exam  Constitutional:       General: She is not in acute distress.  HENT:      Head: Normocephalic.      Mouth/Throat:      Mouth: Mucous membranes are moist.   Cardiovascular:      Rate and Rhythm: Normal rate and regular rhythm.      Pulses: Normal pulses.   Pulmonary:      Effort: Pulmonary effort is normal. No respiratory distress.      Comments: Course   RA   Abdominal:      General: Bowel sounds are normal.      Palpations: Abdomen is soft.   Musculoskeletal:         General: No swelling. Normal range of motion.      Cervical back: Neck supple.   Skin:     General: Skin is warm and dry.      Capillary Refill: Capillary refill takes less than 2 seconds.   Neurological:      Mental Status: She is alert and oriented to person, place, and time.   Psychiatric:         Mood and Affect: Mood normal.         LABORATORY RESULTS      CBC with diff:   Results from last 7 days   Lab Units 24  0549 24  0824 24  1642   WBC Thousand/uL 13.58* 12.77* 18.78*   HEMOGLOBIN g/dL 11.4* 11.6 12.1   HEMATOCRIT % 34.9 35.6 38.6   MCV fL 85 84 86   PLATELETS Thousands/uL 432* 399* 415*   RBC Million/uL 4.13 4.23 4.49   MCH pg 27.6 27.4 26.9   MCHC g/dL 32.7 32.6 31.3*   RDW % 14.4 14.2 13.9 " "  MPV fL 9.2 9.2 9.1   NRBC AUTO /100 WBCs  --  0 0       CMP:  Results from last 7 days   Lab Units 05/29/24  0549 05/28/24  0824 05/27/24  1642   POTASSIUM mmol/L 3.5 3.7 3.7   CHLORIDE mmol/L 103 101 100   CO2 mmol/L 21 23 23   BUN mg/dL 18 17 16   CREATININE mg/dL 0.65 0.60 0.75   CALCIUM mg/dL 8.0* 8.4 8.8   AST U/L  --   --  24   ALT U/L  --   --  23   ALK PHOS U/L  --   --  108*   EGFR ml/min/1.73sq m 84 86 75       BMP:  Results from last 7 days   Lab Units 05/29/24  0549 05/28/24  0824 05/27/24  1642   POTASSIUM mmol/L 3.5 3.7 3.7   CHLORIDE mmol/L 103 101 100   CO2 mmol/L 21 23 23   BUN mg/dL 18 17 16   CREATININE mg/dL 0.65 0.60 0.75   CALCIUM mg/dL 8.0* 8.4 8.8       No results found for: \"NTBNP\"          Results from last 7 days   Lab Units 05/28/24  0824   MAGNESIUM mg/dL 2.5                     Results from last 7 days   Lab Units 05/27/24  1642   INR  1.29*       Lipid Profile:   No results found for: \"CHOL\"  Lab Results   Component Value Date    HDL 49 (L) 05/28/2024     Lab Results   Component Value Date    LDLCALC 71 05/28/2024     Lab Results   Component Value Date    TRIG 116 05/28/2024       Cardiac testing:   No results found for this or any previous visit.    No results found for this or any previous visit.    No results found for this or any previous visit.    No valid procedures specified.  No results found for this or any previous visit.        Meds/Allergies   all current active meds have been reviewed and current meds:   Current Facility-Administered Medications   Medication Dose Route Frequency    acetaminophen (TYLENOL) tablet 650 mg  650 mg Oral Q6H PRN    amiodarone (CORDARONE) 900 mg in dextrose 5 % 500 mL infusion  0.5 mg/min Intravenous Continuous    aspirin chewable tablet 81 mg  81 mg Oral Daily    atorvastatin (LIPITOR) tablet 80 mg  80 mg Oral Daily With Dinner    benzonatate (TESSALON PERLES) capsule 100 mg  100 mg Oral TID    ceftriaxone (ROCEPHIN) 1 g/50 mL in dextrose IVPB "  1,000 mg Intravenous Q24H    co-enzyme Q-10 capsule 30 mg  30 mg Oral TID    guaiFENesin (MUCINEX) 12 hr tablet 1,200 mg  1,200 mg Oral Q12H DEVON    heparin (porcine) 25,000 units in 0.45% NaCl 250 mL infusion (premix)  3-20 Units/kg/hr (Order-Specific) Intravenous Titrated    heparin (porcine) injection 1,650 Units  1,650 Units Intravenous Q6H PRN    heparin (porcine) injection 3,300 Units  3,300 Units Intravenous Q6H PRN    lidocaine (LIDODERM) 5 % patch 2 patch  2 patch Topical Daily    metoprolol tartrate (LOPRESSOR) tablet 50 mg  50 mg Oral Q12H DEVON    pantoprazole (PROTONIX) EC tablet 40 mg  40 mg Oral Early Morning    sodium chloride 0.9 % infusion  100 mL/hr Intravenous Continuous    ticagrelor (BRILINTA) tablet 90 mg  90 mg Oral Q12H DEVON       Medications Prior to Admission:     co-enzyme Q-10 30 MG capsule    Cranberry 300 MG tablet    denosumab (PROLIA) 60 mg/mL    fosinopril (MONOPRIL) 10 mg tablet    Multiple Vitamin (multivitamin) tablet    pantoprazole (PROTONIX) 40 mg tablet    pravastatin (PRAVACHOL) 40 mg tablet    TURMERIC PO    amiodarone (CORDARONE) 900 mg in dextrose 5 % 500 mL infusion, 0.5 mg/min, Last Rate: 0.5 mg/min (05/29/24 0719)  heparin (porcine), 3-20 Units/kg/hr (Order-Specific), Last Rate: 16 Units/kg/hr (05/28/24 9890)  sodium chloride, 100 mL/hr, Last Rate: 100 mL/hr (05/29/24 0720)      Counseling / Coordination of Care  Total floor / unit time spent today 20 minutes.  Greater than 50% of total time was spent with the patient and / or family counseling and / or coordination of care.      ** Please Note: Dragon 360 Dictation voice to text software may have been used in the creation of this document. **

## 2024-05-29 NOTE — NURSING NOTE
RN received in report that pt was having reoccurring episodes of vtach both [facility] and cardiology were aware. Pt currently on amiodarone gtt.    2218 RN reached out to [facility] about vtach occurring q5 min. Pt asymptomatic. /64 HR 66 O2 91 R 23.     2329 Pt HR dropped to 40s. [facility] made aware and instructed RN to get EKG and stop gtt. [facility] provider came to bedside to assess pt and told RN to continue to monitor.     0313 Pt continued to have episodes of vtach throughout the night. RN assessed pt. /75 HR 73 O2 95 R 20    0601 RN reached out to [facility] to make aware episodes of vtach were still occurring. RN again assessed pt. Pt still asymptomatic.    0638 [facility] informed RN to restart amiodarone gtt at 0.5mg/min.

## 2024-05-29 NOTE — ASSESSMENT & PLAN NOTE
Tachycardia, leukocytosis (recently completed course of prednisone on the weekend).  Lactate normal.  Procalcitonin normal.   Source: RLL pneumonia (suspect possible viral)  Antibiotics: completed z vale as outpatient.  Cefepime, vancomycin in ED.   Continue ceftriaxone  Urine antigens negative  blood cultures NGTD

## 2024-05-29 NOTE — UTILIZATION REVIEW
Initial Clinical Review    The patient was transferred to [facility] on 5/28 from [facility], where care began on 5/27. 1 midnight has already been surpassed with active ongoing care.       Admission: Date/Time/Statement:   Admission Orders (From admission, onward)       Ordered        05/28/24 0127  INPATIENT ADMISSION  Once                          Orders Placed This Encounter   Procedures    INPATIENT ADMISSION     Standing Status:   Standing     Number of Occurrences:   1     Order Specific Question:   Level of Care     Answer:   Med Surg [16]     Order Specific Question:   Estimated length of stay     Answer:   More than 2 Midnights     Order Specific Question:   Certification     Answer:   I certify that inpatient services are medically necessary for this patient for a duration of greater than two midnights. See H&P and MD Progress Notes for additional information about the patient's course of treatment.     ED Arrival Information       Patient not seen in ED                       No chief complaint on file.      Initial Presentation: 78 y.o. female with a PMH of HTN, HLD, osteoporosis, rios esophagus  who presented to [facility] ED with cough and abdominal pain.  Cough began 1.5 weeks ago.  Patient was seen at urgent care and diagnosed with pneumonia recently.  She completed course of prednisone and z vale over the weekend but has ongoing cough. Patient was transferred to [facility] for troponin [phone].  EKG NSR, LBBB, PAC. Case was reviewed by ED with interventional radiology who recommended transfer to cath capable facility for non emergent cath. The patient denies any chest pain.  She was started on brilenta, aspirin, heparin drip. Plan: Inpatient admission for evaluation and treatment of elevated troponin, pneumonia, sepsis, HTN, abnormal CT scan, hypercholesterolemia: continue heparin drip, increase statin to 80 mg, start metoprolol 25 mg bid, HgbA1c and lipid panel  pending, Cardiology consult, IV ceftriaxone and doxycyline, Tessalon tid, Mucinex bid, urine antigens, blood cultures pending.    Cardiology consult: Echocardiogram done and images personally reviewed. LVEF severely reduced at 30 to 35%. Akinesis noted in mid to apical LAD territory. With new onset cardiomyopathy, mild troponin elevation and ongoing issues with shortness of breath, recommend proceeding with further evaluation with cardiac catheterization. Continue ASA and heparin drip. She is additionally having frequent runs of NSVT this afternoon-amiodarone bolus followed by drip.     Anticipated Length of Stay/Certification Statement: Patient will be admitted on an inpatient basis with an anticipated length of stay of greater than 2 midnights secondary to cardiology evaluation, IV antibiotics.     Date: 5/29  Day 3: Has surpassed a 2nd midnight with active treatments and services. Continue heparin drip, continue ASA and brilinta, atorvastatin 80 mg, metoprolol 50 mg bid, plan for cardiac cath today, continue amiodarone drip, continue following blood cultures.       ED Triage Vitals   Temperature Pulse Respirations Blood Pressure SpO2   05/28/24 0028 05/28/24 0028 05/28/24 0205 05/28/24 0028 05/28/24 0028   98 °F (36.7 °C) 72 16 143/77 94 %      Temp src Heart Rate Source Patient Position - Orthostatic VS BP Location FiO2 (%)   -- -- -- -- --             Pain Score       05/28/24 0100       No Pain          Wt Readings from Last 1 Encounters:   05/28/24 59 kg (130 lb 1.1 oz)     Additional Vital Signs:     Date/Time Temp Pulse Resp BP MAP (mmHg) SpO2 O2 Device   05/29/24 07:30:34 97.9 °F (36.6 °C) 71 18 137/73 94 93 % --   05/29/24 03:13:41 -- 73 -- 132/75 94 95 % --   05/28/24 23:30:38 97.8 °F (36.6 °C) 63 -- 121/70 87 92 % --   05/28/24 21:42:36 98.2 °F (36.8 °C) 66 -- 130/64 86 90 % --   05/28/24 15:26:29 98.7 °F (37.1 °C) 84 -- 114/80 91 90 % --   05/28/24 1420 -- 87 -- -- -- -- --   05/28/24 1030 -- 68 --  139/75 -- 92 % --   05/28/24 08:08:06 98.2 °F (36.8 °C) 70 -- 139/75 96 94 % --   05/28/24 03:26:10 97.8 °F (36.6 °C) 63 18 119/70 86 92 % --   05/28/24 0205 -- -- 16 -- -- 94 % None (Room air)   05/28/24 01:53:30 -- 85 -- 133/88 103 94 % --     Pertinent Labs/Diagnostic Test Results:     5/29 Cardiac cath:  Impression    No significant obstructive epicardial CAD    5/29 Echo:  Interpretation Summary    Left Ventricle: Left ventricular cavity size is normal. Wall thickness is mildly increased. There is mild concentric hypertrophy. The left ventricular ejection fraction is 30%. Systolic function is severely reduced. Diastolic function is mildly abnormal, consistent with grade I (abnormal) relaxation.    The following segments are akinetic: basal anteroseptal, mid anteroseptal, mid inferoseptal, mid inferior, apical anterior, apical septal, apical inferior, apical lateral and apex.    The following segments are hypokinetic: basal anterior, basal inferoseptal, basal inferior, basal inferolateral, basal anterolateral, mid anterior, mid inferolateral and mid anterolateral.    Right Ventricle: Right ventricular cavity size is normal. Systolic function is normal.    Left Atrium: The atrium is mildly dilated.    Aortic Valve: There is mild regurgitation.    Mitral Valve: There is mild annular calcification. There is mild regurgitation.    Tricuspid Valve: There is mild regurgitation. The right ventricular systolic pressure is mildly elevated. The estimated right ventricular systolic pressure is 39.00 mmHg.      Results from last 7 days   Lab Units 05/29/24  0549 05/28/24  0824 05/27/24  1642   WBC Thousand/uL 13.58* 12.77* 18.78*   HEMOGLOBIN g/dL 11.4* 11.6 12.1   HEMATOCRIT % 34.9 35.6 38.6   PLATELETS Thousands/uL 432* 399* 415*   TOTAL NEUT ABS Thousands/µL  --  10.74* 15.22*         Results from last 7 days   Lab Units 05/29/24  0549 05/28/24  0824 05/27/24  1642   SODIUM mmol/L 135 134* 133*   POTASSIUM mmol/L 3.5 3.7  3.7   CHLORIDE mmol/L 103 101 100   CO2 mmol/L 21 23 23   ANION GAP mmol/L 11 10 10   BUN mg/dL 18 17 16   CREATININE mg/dL 0.65 0.60 0.75   EGFR ml/min/1.73sq m 84 86 75   CALCIUM mg/dL 8.0* 8.4 8.8   MAGNESIUM mg/dL  --  2.5  --      Results from last 7 days   Lab Units 05/27/24  1642   AST U/L 24   ALT U/L 23   ALK PHOS U/L 108*   TOTAL PROTEIN g/dL 7.4   ALBUMIN g/dL 3.8   TOTAL BILIRUBIN mg/dL 0.62         Results from last 7 days   Lab Units 05/29/24  0549 05/28/24  0824 05/27/24  1642   GLUCOSE RANDOM mg/dL 131 145* 114         Results from last 7 days   Lab Units 05/27/24  2043 05/27/24  1856 05/27/24  1642   HS TNI 0HR ng/L  --   --  754*   HS TNI 2HR ng/L  --  2,517*  --    HSTNI D2 ng/L  --  1,763*  --    HS TNI 4HR ng/L 2,556*  --   --    HSTNI D4 ng/L 1,802*  --   --          Results from last 7 days   Lab Units 05/29/24  0549 05/28/24  2239 05/28/24  1542 05/28/24  0022 05/27/24  1642   PROTIME seconds  --   --   --   --  16.5*   INR   --   --   --   --  1.29*   PTT seconds 76* 70* 66*   < > 29    < > = values in this interval not displayed.         Results from last 7 days   Lab Units 05/29/24  0549 05/27/24  1642   PROCALCITONIN ng/ml 0.25 0.19     Results from last 7 days   Lab Units 05/27/24  1642   LACTIC ACID mmol/L 1.4             Results from last 7 days   Lab Units 05/27/24  1642   BNP pg/mL 124*           Results from last 7 days   Lab Units 05/27/24  1642   LIPASE u/L 14           Results from last 7 days   Lab Units 05/27/24 2057   CLARITY UA  Clear   COLOR UA  Yellow   SPEC GRAV UA  <1.005*   PH UA  6.5   GLUCOSE UA mg/dl Negative   KETONES UA mg/dl 10 (1+)*   BLOOD UA  Small*   PROTEIN UA mg/dl 30 (1+)*   NITRITE UA  Negative   BILIRUBIN UA  Negative   UROBILINOGEN UA (BE) mg/dl <2.0   LEUKOCYTES UA  Negative   WBC UA /hpf 0-1   RBC UA /hpf 2-4   BACTERIA UA /hpf Occasional   EPITHELIAL CELLS WET PREP /hpf Occasional     Results from last 7 days   Lab Units 05/28/24  0637   STREP  PNEUMONIAE ANTIGEN, URINE  Negative   LEGIONELLA URINARY ANTIGEN  Negative           Results from last 7 days   Lab Units 05/27/24  1642   BLOOD CULTURE  No Growth at 24 hrs.  No Growth at 24 hrs.             ED Treatment:   Medication Administration - No Administrations Displayed (No Start Event Found)       None          Past Medical History:   Diagnosis Date    Edmondson esophagus     Bundle-branch block     Cervical spine fracture (HCC)     Colon polyp     GERD (gastroesophageal reflux disease)     Hypertension     Post-nasal drip     causing cough    Pure hypercholesterolemia 07/01/2019     Present on Admission:   Pneumonia   Pure hypercholesterolemia      Admitting Diagnosis: nstemi  Age/Sex: 78 y.o. female  Admission Orders:  Scheduled Medications:  aspirin, 81 mg, Oral, Daily  atorvastatin, 80 mg, Oral, Daily With Dinner  benzonatate, 100 mg, Oral, TID  cefTRIAXone, 1,000 mg, Intravenous, Q24H  co-enzyme Q-10, 30 mg, Oral, TID  doxycycline hyclate, 100 mg, Oral, Q12H  guaiFENesin, 1,200 mg, Oral, Q12H DEVON  lidocaine, 2 patch, Topical, Daily  metoprolol tartrate, 50 mg, Oral, Q12H DEVON  pantoprazole, 40 mg, Oral, Early Morning  ticagrelor, 90 mg, Oral, Q12H DEVON      Continuous IV Infusions:  amiodarone (CORDARONE) 900 mg in dextrose 5 % 500 mL infusion, 0.5 mg/min, Intravenous, Continuous  heparin (porcine), 3-20 Units/kg/hr (Order-Specific), Intravenous, Titrated  sodium chloride, 100 mL/hr, Intravenous, Continuous      PRN Meds:  acetaminophen, 650 mg, Oral, Q6H PRN  heparin (porcine), 1,650 Units, Intravenous, Q6H PRN  heparin (porcine), 3,300 Units, Intravenous, Q6H PRN        IP CONSULT TO CARDIOLOGY    Network Utilization Review Department  ATTENTION: Please call with any questions or concerns to [phone] and carefully listen to the prompts so that you are directed to the right person. All voicemails are confidential.   For Discharge needs, contact Care Management DC Support Team at [phone] opt.  2  Send all requests for admission clinical reviews, approved or denied determinations and any other requests to dedicated fax number below belonging to the campus where the patient is receiving treatment. List of dedicated fax numbers for the Facilities:  FACILITY NAME UR FAX NUMBER   ADMISSION DENIALS (Administrative/Medical Necessity) [phone]   DISCHARGE SUPPORT TEAM (NETWORK) [phone]   PARENT CHILD HEALTH (Maternity/NICU/Pediatrics) [phone]   [facility] [phone]   [facility] [phone]   [facility] [phone]   [facility] [phone]   [facility] [phone]   [facility] [phone]   General acute hospital [phone]   [facility] [phone]   [facility] [phone]   [facility] [phone]   [facility] [phone]   [facility] [phone]

## 2024-05-29 NOTE — SPEECH THERAPY NOTE
Speech Language/Pathology    Speech/Language Pathology Cancel Note    Patient Name: Noble Chou  Today's Date: 5/29/2024       Pt continues to be NPO for cardiac cath. Will follow up and complete swallow eval when able.       Raya Gutierrez MA CCC-SLP  Speech Pathologist  Available via  tiger text

## 2024-05-30 PROBLEM — I47.29 NSVT (NONSUSTAINED VENTRICULAR TACHYCARDIA) (HCC): Status: ACTIVE | Noted: 2024-05-30

## 2024-05-30 PROBLEM — I42.8 NONISCHEMIC CARDIOMYOPATHY (HCC): Status: ACTIVE | Noted: 2024-05-30

## 2024-05-30 LAB
ANION GAP SERPL CALCULATED.3IONS-SCNC: 10 MMOL/L (ref 4–13)
BASOPHILS # BLD AUTO: 0.02 THOUSANDS/ÂΜL (ref 0–0.1)
BASOPHILS NFR BLD AUTO: 0 % (ref 0–1)
BUN SERPL-MCNC: 22 MG/DL (ref 5–25)
CALCIUM SERPL-MCNC: 7.6 MG/DL (ref 8.4–10.2)
CHLORIDE SERPL-SCNC: 107 MMOL/L (ref 96–108)
CO2 SERPL-SCNC: 17 MMOL/L (ref 21–32)
CREAT SERPL-MCNC: 0.77 MG/DL (ref 0.6–1.3)
EOSINOPHIL # BLD AUTO: 0 THOUSAND/ÂΜL (ref 0–0.61)
EOSINOPHIL NFR BLD AUTO: 0 % (ref 0–6)
ERYTHROCYTE [DISTWIDTH] IN BLOOD BY AUTOMATED COUNT: 14.6 % (ref 11.6–15.1)
GFR SERPL CREATININE-BSD FRML MDRD: 73 ML/MIN/1.73SQ M
GLUCOSE SERPL-MCNC: 135 MG/DL (ref 65–140)
HCT VFR BLD AUTO: 34.5 % (ref 34.8–46.1)
HGB BLD-MCNC: 11.3 G/DL (ref 11.5–15.4)
IMM GRANULOCYTES # BLD AUTO: 0.09 THOUSAND/UL (ref 0–0.2)
IMM GRANULOCYTES NFR BLD AUTO: 1 % (ref 0–2)
LYMPHOCYTES # BLD AUTO: 1.31 THOUSANDS/ÂΜL (ref 0.6–4.47)
LYMPHOCYTES NFR BLD AUTO: 9 % (ref 14–44)
MCH RBC QN AUTO: 27.7 PG (ref 26.8–34.3)
MCHC RBC AUTO-ENTMCNC: 32.8 G/DL (ref 31.4–37.4)
MCV RBC AUTO: 85 FL (ref 82–98)
MONOCYTES # BLD AUTO: 0.98 THOUSAND/ÂΜL (ref 0.17–1.22)
MONOCYTES NFR BLD AUTO: 7 % (ref 4–12)
NEUTROPHILS # BLD AUTO: 12.33 THOUSANDS/ÂΜL (ref 1.85–7.62)
NEUTS SEG NFR BLD AUTO: 83 % (ref 43–75)
NRBC BLD AUTO-RTO: 0 /100 WBCS
PLATELET # BLD AUTO: 465 THOUSANDS/UL (ref 149–390)
PMV BLD AUTO: 9.3 FL (ref 8.9–12.7)
POTASSIUM SERPL-SCNC: 4 MMOL/L (ref 3.5–5.3)
PROCALCITONIN SERPL-MCNC: 0.19 NG/ML
RBC # BLD AUTO: 4.08 MILLION/UL (ref 3.81–5.12)
SODIUM SERPL-SCNC: 134 MMOL/L (ref 135–147)
TSH SERPL DL<=0.05 MIU/L-ACNC: 1.83 UIU/ML (ref 0.45–4.5)
WBC # BLD AUTO: 14.73 THOUSAND/UL (ref 4.31–10.16)

## 2024-05-30 PROCEDURE — 94664 DEMO&/EVAL PT USE INHALER: CPT

## 2024-05-30 PROCEDURE — 94760 N-INVAS EAR/PLS OXIMETRY 1: CPT

## 2024-05-30 PROCEDURE — 84145 PROCALCITONIN (PCT): CPT | Performed by: PHYSICIAN ASSISTANT

## 2024-05-30 PROCEDURE — 80048 BASIC METABOLIC PNL TOTAL CA: CPT

## 2024-05-30 PROCEDURE — 99233 SBSQ HOSP IP/OBS HIGH 50: CPT | Performed by: PHYSICIAN ASSISTANT

## 2024-05-30 PROCEDURE — 99223 1ST HOSP IP/OBS HIGH 75: CPT | Performed by: INTERNAL MEDICINE

## 2024-05-30 PROCEDURE — 84443 ASSAY THYROID STIM HORMONE: CPT | Performed by: INTERNAL MEDICINE

## 2024-05-30 PROCEDURE — 92610 EVALUATE SWALLOWING FUNCTION: CPT

## 2024-05-30 PROCEDURE — 85025 COMPLETE CBC W/AUTO DIFF WBC: CPT

## 2024-05-30 PROCEDURE — 99232 SBSQ HOSP IP/OBS MODERATE 35: CPT | Performed by: INTERNAL MEDICINE

## 2024-05-30 RX ORDER — AMIODARONE HYDROCHLORIDE 200 MG/1
200 TABLET ORAL
Status: DISCONTINUED | OUTPATIENT
Start: 2024-06-14 | End: 2024-06-03 | Stop reason: HOSPADM

## 2024-05-30 RX ORDER — ENOXAPARIN SODIUM 100 MG/ML
40 INJECTION SUBCUTANEOUS EVERY 24 HOURS
Status: DISCONTINUED | OUTPATIENT
Start: 2024-05-30 | End: 2024-06-03 | Stop reason: HOSPADM

## 2024-05-30 RX ORDER — AMIODARONE HYDROCHLORIDE 200 MG/1
200 TABLET ORAL
Status: DISCONTINUED | OUTPATIENT
Start: 2024-05-30 | End: 2024-06-03 | Stop reason: HOSPADM

## 2024-05-30 RX ORDER — AMIODARONE HYDROCHLORIDE 200 MG/1
200 TABLET ORAL 2 TIMES DAILY WITH MEALS
Status: DISCONTINUED | OUTPATIENT
Start: 2024-06-06 | End: 2024-06-03 | Stop reason: HOSPADM

## 2024-05-30 RX ADMIN — METOPROLOL TARTRATE 50 MG: 50 TABLET, FILM COATED ORAL at 09:39

## 2024-05-30 RX ADMIN — CEFTRIAXONE SODIUM 1000 MG: 10 INJECTION, POWDER, FOR SOLUTION INTRAVENOUS at 05:54

## 2024-05-30 RX ADMIN — Medication 30 MG: at 21:34

## 2024-05-30 RX ADMIN — SACUBITRIL AND VALSARTAN 1 TABLET: 24; 26 TABLET, FILM COATED ORAL at 17:28

## 2024-05-30 RX ADMIN — METOPROLOL TARTRATE 50 MG: 50 TABLET, FILM COATED ORAL at 00:27

## 2024-05-30 RX ADMIN — AMIODARONE HYDROCHLORIDE 200 MG: 200 TABLET ORAL at 14:19

## 2024-05-30 RX ADMIN — ATORVASTATIN CALCIUM 80 MG: 40 TABLET, FILM COATED ORAL at 17:28

## 2024-05-30 RX ADMIN — GUAIFENESIN 1200 MG: 600 TABLET ORAL at 21:34

## 2024-05-30 RX ADMIN — LIDOCAINE 5% 2 PATCH: 700 PATCH TOPICAL at 05:44

## 2024-05-30 RX ADMIN — BENZONATATE 100 MG: 100 CAPSULE ORAL at 21:34

## 2024-05-30 RX ADMIN — ENOXAPARIN SODIUM 40 MG: 40 INJECTION SUBCUTANEOUS at 14:20

## 2024-05-30 RX ADMIN — BENZONATATE 100 MG: 100 CAPSULE ORAL at 09:39

## 2024-05-30 RX ADMIN — PANTOPRAZOLE SODIUM 40 MG: 40 TABLET, DELAYED RELEASE ORAL at 05:44

## 2024-05-30 RX ADMIN — AMIODARONE HYDROCHLORIDE 200 MG: 200 TABLET ORAL at 17:28

## 2024-05-30 RX ADMIN — BENZONATATE 100 MG: 100 CAPSULE ORAL at 17:28

## 2024-05-30 RX ADMIN — ASPIRIN 81 MG CHEWABLE TABLET 81 MG: 81 TABLET CHEWABLE at 09:39

## 2024-05-30 RX ADMIN — SACUBITRIL AND VALSARTAN 1 TABLET: 24; 26 TABLET, FILM COATED ORAL at 14:19

## 2024-05-30 RX ADMIN — METOPROLOL TARTRATE 50 MG: 50 TABLET, FILM COATED ORAL at 17:28

## 2024-05-30 RX ADMIN — GUAIFENESIN 1200 MG: 600 TABLET ORAL at 09:39

## 2024-05-30 RX ADMIN — Medication 30 MG: at 09:44

## 2024-05-30 RX ADMIN — Medication 30 MG: at 17:28

## 2024-05-30 NOTE — CONSULTS
Consultation - Pulmonary Medicine   Noble Chou 78 y.o. female MRN: [id number]  Unit/Bed#: S -01 Encounter: [id number]      Assessment:  Community-acquired pneumonia  Possibly in setting of aspiration  Blood cultures NGTD, urine Legionella/strep negative  Sputum culture pending  CTA chest: No pulmonary embolus, dense right lower lobe consolidation with endoluminal debris, retained debris in esophagus.  Small right pleural effusion  History of Edmondson's esophagus  Non-MI troponin elevation  New onset NICM- possibly viral  Frequent NSVT  LVEF 30%  CATH: no significant obstructive CAD  Plan for life-vest prior to discharge per cardiology    Plan:   Maintain SpO2 greater than 90%  Continue with ceftriaxone for 5 days total.  May transition to cefuroxime 200 mg twice daily p.o. upon discharge to complete 5-day course  Agree with discontinuation of doxycycline.  Aggressive pulmonary toileting: Flutter valve, 3% neb, albuterol  Will defer bronchoscopy at this time given the patient has already received 3 days of antibiotics, as well as having overall clinical improvement.  Continue with PPI to avoid microaspiration  Will need repeat chest x-ray in 8-12 weeks to monitor for resolution.     History of Present Illness   Physician Requesting Consult: Brannon Wakefield MD  Reason for Consult / Principal Problem: CAP  Hx and PE limited by: patient    HPI: Noble Chou is a 78 y.o. year old female with past medical history of hypertension, Edmondson's esophagus who was admitted on 5/27 for elevated troponin and pneumonia.  Patient notes that a couple weeks ago she had been seen at a urgent care for which she was diagnosed with community-acquired pneumonia and was treated with azithromycin.  Patient was seen at [facility] ED for cough and abdominal pain which recurred again on 5/27.  She was found to have elevated troponins, and was transferred to [facility] for evaluation for urgent cath.  Cardiac catheter showed no  significant obstructive coronary artery disease, however 2D echo did show a new LVEF of 30%, which is most likely nonischemic and probably of viral etiology.  Patient notes improvement in shortness of breath, has been on room air.  Patient continues to have productive cough however and has not been compliant with flutter valve on admission so far.  Patient otherwise is eager to go home.          Inpatient consult to Pulmonology     Date/Time  5/28/2024 12:03 AM     Performed by  Walter Kapadia DO   Authorized by  Naomi Oglesby PA-C             Review of Systems   Constitutional:  Negative for chills and fever.   HENT:  Negative for congestion and postnasal drip.    Respiratory:  Positive for cough (productive). Negative for shortness of breath.    Cardiovascular:  Negative for chest pain and palpitations.   Gastrointestinal:  Negative for abdominal pain.   Genitourinary:  Negative for difficulty urinating.   Musculoskeletal:  Negative for myalgias.   Neurological:  Negative for dizziness.       Historical Information   Past Medical History:   Diagnosis Date    Edmondson esophagus     Bundle-branch block     Cervical spine fracture (HCC)     Colon polyp     GERD (gastroesophageal reflux disease)     Hypertension     Post-nasal drip     causing cough    Pure hypercholesterolemia 07/01/2019     Past Surgical History:   Procedure Laterality Date    CARDIAC CATHETERIZATION N/A 5/29/2024    Procedure: Cardiac catheterization;  Surgeon: Nilesh Mullen DO;  Location: [facility];  Service: Cardiology    CARDIAC CATHETERIZATION N/A 5/29/2024    Procedure: Cardiac Coronary Angiogram;  Surgeon: Nilesh Mullen DO;  [facility];  Service: Cardiology    COLONOSCOPY  09/08/2017    5 yr recall    JOINT REPLACEMENT Right     hip    TIBIAL IM QUIRINO REMOVAL      TOTAL HIP ARTHROPLASTY      UPPER GASTROINTESTINAL ENDOSCOPY  10/18/2018    3 yr recall     Social History   Social History     Substance and  "Sexual Activity   Alcohol Use Never     Social History     Substance and Sexual Activity   Drug Use Never     E-Cigarette/Vaping    E-Cigarette Use Never User      E-Cigarette/Vaping Substances     Social History     Tobacco Use   Smoking Status Never    Passive exposure: Never   Smokeless Tobacco Never       Family History: non-contributory    Meds/Allergies   all current active meds have been reviewed    No Known Allergies    Objective   Vitals: Blood pressure 138/83, pulse 58, temperature 97.6 °F (36.4 °C), resp. rate 18, height 5' 1.75\" (1.568 m), weight 59 kg (130 lb 1.1 oz), SpO2 94%.,Body mass index is 23.98 kg/m².    Intake/Output Summary (Last 24 hours) at 5/30/2024 1304  Last data filed at 5/29/2024 1901  Gross per 24 hour   Intake 0 ml   Output 0 ml   Net 0 ml     Invasive Devices       Peripheral Intravenous Line  Duration             Peripheral IV 05/27/24 Right Antecubital 2 days                    Physical Exam  HENT:      Head: Normocephalic.      Nose: No congestion.      Mouth/Throat:      Mouth: Mucous membranes are moist.   Cardiovascular:      Rate and Rhythm: Normal rate.      Pulses: Normal pulses.      Heart sounds: Normal heart sounds.   Pulmonary:      Effort: Pulmonary effort is normal. No respiratory distress.      Breath sounds: No stridor. No wheezing, rhonchi or rales.      Comments: Decreased breath sounds in right lower lung field  Chest:      Chest wall: No tenderness.   Abdominal:      General: Abdomen is flat.   Musculoskeletal:      Cervical back: Normal range of motion.      Right lower leg: No edema.      Left lower leg: No edema.   Skin:     General: Skin is warm.      Capillary Refill: Capillary refill takes less than 2 seconds.   Neurological:      Mental Status: She is alert and oriented to person, place, and time.   Psychiatric:         Mood and Affect: Mood normal.       Lab Results: I have personally reviewed pertinent lab results.  Imaging Studies: I have personally " reviewed pertinent reports.    EKG, Pathology, and Other Studies: I have personally reviewed pertinent films in PACS    Code Status: Level 1 - Full Code  Advance Directive and Living Will:      Power of :    POLST:      Walter Kapadia DO PGY-IV  Pulmonary Critical Care Fellow  [facility]

## 2024-05-30 NOTE — PROGRESS NOTES
Critical access hospital  Progress Note  Name: Noble Chou I  MRN: [id number]  Unit/Bed#: S -01 I Date of Admission: 5/28/2024   Date of Service: 5/30/2024 I Hospital Day: 2    Assessment & Plan   * Pneumonia  Assessment & Plan  Presented to ED with abdominal pain, cough.  CT: RLL pneumonia. Small right pleural effusion. Patchy left sided opacities with endoluminal debris.  Consideration for aspiration.   Antibiotics   Completed Z vale as outpatient  Cefepime, vancomycin in ED. Transition to ceftriaxone and doxycycline.  Doxycycline was discontinued with normal urinary antigens.  Patient remains on ceftriaxone however WBCs are up trending.  Tessalon TID, mucinex BID  Respiratory protocol placed - patient declined breathing treatments  Pulmonary consulted and appreciate their recommendations.  Patient may require bronchoscopy based upon findings of debris in the airways.    Nonischemic cardiomyopathy (HCC)  Assessment & Plan  Patient noted with severely reduced EF on echocardiogram at 30%.  Patient has been fitted with a LifeVest.  Follow-up with cardiology as an outpatient.    NSVT (nonsustained ventricular tachycardia) (HCC)  Assessment & Plan  Patient noted to have multiple short runs of NSVT.  Cardiology has uptitrated her metoprolol dose.  She is maintained on amiodarone infusion.  Appreciate cardiology's recommendations to transition to p.o. regimen.    Primary hypertension  Assessment & Plan  Had been on fosinopril as an out-patient, which was held for cardiac catheterization.    Patient was also started on metoprolol secondary to NSVT.  Blood pressures have remained stable and acceptable.    Compression deformity of vertebra  Assessment & Plan  Age indetermine L3 compression deformity   Has occasional mild low back pain, none currently.  Patient with low calcium levels and likely has underlying osteopenia or osteoporosis and may benefit from an out-patient DEXA scan.    Abnormal CT  scan  Assessment & Plan  Indeterminate 1.3 left renal lesion.  Cannot distinguish hyperdenst cyst from solid neoplasm on single phase CT.    Nonemergent renal protocol or MRI  Abnormal appearance of uterus or vaginal cuff.  Soft tissue density surrounded by rim of fluid is indeterminate but presence of neoplasm cannot be excluded  Non emergent pelvic US  Consider imaging as inpatient vs outpatient.  Patient is aware of above findings    Sepsis (HCC)  Assessment & Plan  Tachycardia, leukocytosis (recently completed course of prednisone on the weekend).  Lactate normal.  Procalcitonin normal.   Source: RLL pneumonia (suspect possible viral)  Antibiotics: completed z vale as outpatient.  Cefepime, vancomycin in ED. transitioned to ceftriaxone.  Concern for aspiration given patient's history of Edmondson's esophagus and speech evaluation.  WBCs are uptrending despite IV ceftriaxone.  Awaiting pulmonary evaluation.  Likely will need to escalate antibiotics.  Urine antigens negative  Blood cultures: Negative at 48 hours    Elevated troponin level not due myocardial infarction  Assessment & Plan  Originally presented to [facility] with abdominal pain complaints after completing Z-vale for pneumonia.  Found to have up-trending troponins so was transferred to [place] for cardiac catheterization.  Cardiology consulted and appreciate their recommendations.  EKG: LBBB, PACs  Loaded with Brilenta, ASA and started on ACS heparin originally.  Increase statin from pravastatin to lipitor 80 mg  Started metoprolol tartrate with dose escalation to 50 mg every 8 hours secondary to NSVT.  LHC (5/29/24) without obstructive CAD  ECHO (5/28/24): EF 30% with grade 1 diastolic dysfunction.    Edmondson's esophagus without dysplasia  Assessment & Plan  Patient with a known history of Edmondson's esophagus.  She follows with GI as an outpatient.      VTE Pharmacologic Prophylaxis: VTE Score: 6  AC had been held for cardiac catheterization.  Will  start patient on SC Lovenox given VTE score of 6    Mobility:   Basic Mobility Inpatient Raw Score: 18  JH-HLM Goal: 6: Walk 10 steps or more  JH-HLM Achieved: 6: Walk 10 steps or more  JH-HLM Goal achieved. Continue to encourage appropriate mobility.    Patient Centered Rounds:  I provided the patient's nurse with an update immediately after seeing her.    Discussions with Specialists or Other Care Team Provider: Will discuss with both cardiology and pulmonary.    Education and Discussions with Family / Patient: Updated  () at bedside.    Total Time Spent on Date of Encounter in care of patient:  mins. This time was spent on one or more of the following: performing physical exam; counseling and coordination of care; obtaining or reviewing history; documenting in the medical record; reviewing/ordering tests, medications or procedures; communicating with other healthcare professionals and discussing with patient's family/caregivers.    Current Length of Stay: 2 day(s)  Current Patient Status: Inpatient   Certification Statement: The patient will continue to require additional inpatient hospital stay due to continued need for IV antibiotics and management of her pneumonia.  Discharge Plan: Anticipate discharge in 48 hrs to home.    Code Status: Level 1 - Full Code    Subjective:   Patient continues to complain of some dyspnea.  She has a cough but is not able to expectorate any sputum.    Objective:     Vitals:   Temp (24hrs), Av.9 °F (36.6 °C), Min:97.6 °F (36.4 °C), Max:98 °F (36.7 °C)    Temp:  [97.6 °F (36.4 °C)-98 °F (36.7 °C)] 97.6 °F (36.4 °C)  HR:  [55-82] 58  BP: (109-138)/(65-99) 138/83  SpO2:  [86 %-95 %] 94 %  Body mass index is 23.98 kg/m².     Input and Output Summary (last 24 hours):     Intake/Output Summary (Last 24 hours) at 2024 1210  Last data filed at 2024 1901  Gross per 24 hour   Intake 0 ml   Output 0 ml   Net 0 ml       Physical Exam:   Physical Exam  Vitals  reviewed.   HENT:      Head: Normocephalic and atraumatic.      Mouth/Throat:      Mouth: Mucous membranes are moist.   Eyes:      General: No scleral icterus.  Cardiovascular:      Rate and Rhythm: Normal rate and regular rhythm.      Heart sounds: No murmur heard.  Pulmonary:      Breath sounds: Examination of the right-upper field reveals decreased breath sounds. Decreased breath sounds present. No wheezing, rhonchi or rales.      Comments: Patient appears mildly dyspneic although on room air with normal sats.  Chest:      Chest wall: No tenderness.   Abdominal:      General: Bowel sounds are normal. There is no distension.      Palpations: Abdomen is soft.      Tenderness: There is no abdominal tenderness.   Musculoskeletal:         General: Normal range of motion.   Skin:     General: Skin is warm and dry.      Findings: No rash.   Psychiatric:         Mood and Affect: Mood normal.         Cognition and Memory: Cognition normal.          Additional Data:     Labs:  Results from last 7 days   Lab Units 05/30/24  0542   WBC Thousand/uL 14.73*   HEMOGLOBIN g/dL 11.3*   HEMATOCRIT % 34.5*   PLATELETS Thousands/uL 465*   SEGS PCT % 83*   LYMPHO PCT % 9*   MONO PCT % 7   EOS PCT % 0     Results from last 7 days   Lab Units 05/30/24  0542 05/28/24  0824 05/27/24  1642   SODIUM mmol/L 134*   < > 133*   POTASSIUM mmol/L 4.0   < > 3.7   CHLORIDE mmol/L 107   < > 100   CO2 mmol/L 17*   < > 23   BUN mg/dL 22   < > 16   CREATININE mg/dL 0.77   < > 0.75   ANION GAP mmol/L 10   < > 10   CALCIUM mg/dL 7.6*   < > 8.8   ALBUMIN g/dL  --   --  3.8   TOTAL BILIRUBIN mg/dL  --   --  0.62   ALK PHOS U/L  --   --  108*   ALT U/L  --   --  23   AST U/L  --   --  24   GLUCOSE RANDOM mg/dL 135   < > 114    < > = values in this interval not displayed.     Results from last 7 days   Lab Units 05/27/24  1642   INR  1.29*             Results from last 7 days   Lab Units 05/30/24  0542 05/29/24  0549 05/27/24  1642   LACTIC ACID mmol/L  --    --  1.4   PROCALCITONIN ng/ml 0.19 0.25 0.19       Lines/Drains:  Invasive Devices       Peripheral Intravenous Line  Duration             Peripheral IV 05/27/24 Right Antecubital 2 days                  Telemetry:  Telemetry Orders (From admission, onward)               24 Hour Telemetry Monitoring  Continuous x 24 Hours (Telem)        Expiring   Question:  Reason for 24 Hour Telemetry  Answer:  Arrhythmias requiring acute medical intervention / PPM or ICD malfunction                     Telemetry Reviewed: Normal Sinus Rhythm  Indication for Continued Telemetry Use: Lifevest (remains on tele entire hospital stay)         Imaging: Reviewed radiology reports from this admission including: chest CT scan    Recent Cultures (last 7 days):   Results from last 7 days   Lab Units 05/28/24  0637 05/27/24  1642   BLOOD CULTURE   --  No Growth at 48 hrs.  No Growth at 48 hrs.   LEGIONELLA URINARY ANTIGEN  Negative  --        Last 24 Hours Medication List:   Current Facility-Administered Medications   Medication Dose Route Frequency Provider Last Rate    acetaminophen  650 mg Oral Q6H PRN Basia Grecsek, CRNP      albuterol  2.5 mg Nebulization Q6H PRN Basia eWicsek, CRNP      amiodarone (CORDARONE) 900 mg in dextrose 5 % 500 mL infusion  0.5 mg/min Intravenous Continuous Basia Grecsek, CRNP 0.5 mg/min (05/29/24 0719)    aspirin  81 mg Oral Daily Basia Weicsek, CRNP      atorvastatin  80 mg Oral Daily With Dinner Basia Weicsek, CRNP      benzonatate  100 mg Oral TID Basia Grecsek, CRNP      cefTRIAXone  1,000 mg Intravenous Q24H Basia Weicsek, CRNP 1,000 mg (05/30/24 0554)    co-enzyme Q-10  30 mg Oral TID Basia Grecsek, CRNP      guaiFENesin  1,200 mg Oral Q12H DEVON Basia Weicsek, CRNP      lidocaine  2 patch Topical Daily Basia Dario, CAROL      metoprolol tartrate  50 mg Oral Q8H Ye Ruiz MD      pantoprazole  40 mg Oral Early Morning Basia DanaekKENDALLNP          Today, Patient Was  Seen By: Amparo Quintana PA-C    **Please Note: This note may have been constructed using a voice recognition system.**

## 2024-05-30 NOTE — PLAN OF CARE
Problem: PAIN - ADULT  Goal: Verbalizes/displays adequate comfort level or baseline comfort level  Description: Interventions:  - Encourage patient to monitor pain and request assistance  - Assess pain using appropriate pain scale  - Administer analgesics based on type and severity of pain and evaluate response  - Implement non-pharmacological measures as appropriate and evaluate response  - Consider cultural and social influences on pain and pain management  - Notify physician/advanced practitioner if interventions unsuccessful or patient reports new pain  Outcome: Progressing     Problem: INFECTION - ADULT  Goal: Absence or prevention of progression during hospitalization  Description: INTERVENTIONS:  - Assess and monitor for signs and symptoms of infection  - Monitor lab/diagnostic results  - Monitor all insertion sites, i.e. indwelling lines, tubes, and drains  - Monitor endotracheal if appropriate and nasal secretions for changes in amount and color  - Purdy appropriate cooling/warming therapies per order  - Administer medications as ordered  - Instruct and encourage patient and family to use good hand hygiene technique  - Identify and instruct in appropriate isolation precautions for identified infection/condition  Outcome: Progressing  Goal: Absence of fever/infection during neutropenic period  Description: INTERVENTIONS:  - Monitor WBC    Outcome: Progressing     Problem: SAFETY ADULT  Goal: Patient will remain free of falls  Description: INTERVENTIONS:  - Educate patient/family on patient safety including physical limitations  - Instruct patient to call for assistance with activity   - Consult OT/PT to assist with strengthening/mobility   - Keep Call bell within reach  - Keep bed low and locked with side rails adjusted as appropriate  - Keep care items and personal belongings within reach  - Initiate and maintain comfort rounds  - Make Fall Risk Sign visible to staff  - Offer Toileting every  Hours,  in advance of need  - Initiate/Maintain alarm  - Obtain necessary fall risk management equipment:   - Apply yellow socks and bracelet for high fall risk patients  - Consider moving patient to room near nurses station  Outcome: Progressing  Goal: Maintain or return to baseline ADL function  Description: INTERVENTIONS:  -  Assess patient's ability to carry out ADLs; assess patient's baseline for ADL function and identify physical deficits which impact ability to perform ADLs (bathing, care of mouth/teeth, toileting, grooming, dressing, etc.)  - Assess/evaluate cause of self-care deficits   - Assess range of motion  - Assess patient's mobility; develop plan if impaired  - Assess patient's need for assistive devices and provide as appropriate  - Encourage maximum independence but intervene and supervise when necessary  - Involve family in performance of ADLs  - Assess for home care needs following discharge   - Consider OT consult to assist with ADL evaluation and planning for discharge  - Provide patient education as appropriate  Outcome: Progressing  Goal: Maintains/Returns to pre admission functional level  Description: INTERVENTIONS:  - Perform AM-PAC 6 Click Basic Mobility/ Daily Activity assessment daily.  - Set and communicate daily mobility goal to care team and patient/family/caregiver.   - Collaborate with rehabilitation services on mobility goals if consulted  - Perform Range of Motion  times a day.  - Reposition patient every  hours.  - Dangle patient  times a day  - Stand patient  times a day  - Ambulate patient  times a day  - Out of bed to chair  times a day   - Out of bed for meals times a day  - Out of bed for toileting  - Record patient progress and toleration of activity level   Outcome: Progressing     Problem: DISCHARGE PLANNING  Goal: Discharge to home or other facility with appropriate resources  Description: INTERVENTIONS:  - Identify barriers to discharge w/patient and caregiver  - Arrange for  needed discharge resources and transportation as appropriate  - Identify discharge learning needs (meds, wound care, etc.)  - Arrange for interpretive services to assist at discharge as needed  - Refer to Case Management Department for coordinating discharge planning if the patient needs post-hospital services based on physician/advanced practitioner order or complex needs related to functional status, cognitive ability, or social support system  Outcome: Progressing     Problem: Knowledge Deficit  Goal: Patient/family/caregiver demonstrates understanding of disease process, treatment plan, medications, and discharge instructions  Description: Complete learning assessment and assess knowledge base.  Interventions:  - Provide teaching at level of understanding  - Provide teaching via preferred learning methods  Outcome: Progressing     Problem: Nutrition/Hydration-ADULT  Goal: Nutrient/Hydration intake appropriate for improving, restoring or maintaining nutritional needs  Description: Monitor and assess patient's nutrition/hydration status for malnutrition. Collaborate with interdisciplinary team and initiate plan and interventions as ordered.  Monitor patient's weight and dietary intake as ordered or per policy. Utilize nutrition screening tool and intervene as necessary. Determine patient's food preferences and provide high-protein, high-caloric foods as appropriate.     INTERVENTIONS:  - Monitor oral intake, urinary output, labs, and treatment plans  - Assess nutrition and hydration status and recommend course of action  - Evaluate amount of meals eaten  - Assist patient with eating if necessary   - Allow adequate time for meals  - Recommend/ encourage appropriate diets, oral nutritional supplements, and vitamin/mineral supplements  - Order, calculate, and assess calorie counts as needed  - Recommend, monitor, and adjust tube feedings and TPN/PPN based on assessed needs  - Assess need for intravenous fluids  -  Provide specific nutrition/hydration education as appropriate  - Include patient/family/caregiver in decisions related to nutrition  Outcome: Progressing

## 2024-05-30 NOTE — CASE MANAGEMENT
Case Management Progress Note    Patient name Noble Chou  Location S /S -01 MRN [id number]  : 1945 Date 2024       LOS (days): 2  Geometric Mean LOS (GMLOS) (days): 3.7  Days to GMLOS:1.3        OBJECTIVE:        Current admission status: Inpatient  Preferred Pharmacy:   [facility]/pharmacy #[id number] - [place] - [address]  [address] [place] [address]  Phone: [phone] Fax: [phone]    [facility]/pharmacy #[id number] - [facility] [place] - [address]  [address] [facility] [place] [address]  Phone: [phone] Fax: [phone]    Primary Care Provider: Angela Phelps MD    Primary Insurance: [employer] RE[employer]  Secondary Insurance:     PROGRESS NOTE:    LifeVest ordered for patient - per Fide Rosdao Rep, patient scheduled for fit at 11:00am. RN aware of same.

## 2024-05-30 NOTE — ASSESSMENT & PLAN NOTE
Originally presented to [facility] with abdominal pain complaints after completing Z-vale for pneumonia.  Found to have up-trending troponins so was transferred to [place] for cardiac catheterization.  Cardiology consulted and appreciate their recommendations.  EKG: LBBB, PACs  Loaded with Brilenta, ASA and started on ACS heparin originally.  Increase statin from pravastatin to lipitor 80 mg  Started metoprolol tartrate with dose escalation to 50 mg every 8 hours secondary to NSVT.  LHC (5/29/24) without obstructive CAD  ECHO (5/28/24): EF 30% with grade 1 diastolic dysfunction.

## 2024-05-30 NOTE — RESPIRATORY THERAPY NOTE
05/30/24 0744   Respiratory Assessment   Assessment Type Assess only   General Appearance Alert;Awake   Respiratory Pattern Dyspnea at rest   Chest Assessment Chest expansion symmetrical   Bilateral Breath Sounds Diminished;Coarse   Cough Congested;Non-productive   Resp Comments Assessed patient for PRN indication, Patient states she just wants to go home. She did feel as if she had a slight wheeze this AM when she coughs. Noted no wheezing heard on exam. Offered patient PRN albuterol however patient declined as she does not want something that will make her cough more. Told patient to reach out to RN if she changes her mind. Patient reeducated on importance of using flutter for AC.   Oxygen Therapy/Pulse Ox   O2 Device None (Room air)   SpO2 94 %   SpO2 Activity At Rest   $ Pulse Oximetry Spot Check Charge Completed

## 2024-05-30 NOTE — SPEECH THERAPY NOTE
Speech-Language Pathology Bedside Swallow Evaluation        Patient Name: Noble Chou    Today's Date: 5/30/2024     Summary    Pt presents with oropharyngeal swallowing function WNL. Suspect esophageal dysphagia with possible bottom up aspiration risk due to history of reflux and Rios's esophagus.     Recommendations:   Diet: regular diet and thin liquids   Meds: whole with puree   Frequent Oral care: 2x/day  Reflux precautions: upright posture and remain upright during meals and for at least 40 minutes follow  Other Recommendations/ considerations:  Consider Barium Swallow Study, no follow up ST indicated    Current Medical Status  Pt is a 78 y.o. female who presented to [facility]  with a PMH of HTN, HLD, osteoporosis, rios esophagus  who presented to [facility] ED with cough and abdominal pain.  Cough began 1.5 weeks ago.  Patient was seen at urgent care and diagnosed with pneumonia recently.  She completed course of prednisone and z vale over the weekend but has ongoing cough. Patient met sepsis and was started on cefepime and vancomycin.  Patient was transferred to [facility] for troponin [phone].  EKG NSR, LBBB, PAC.  Case was reviewed by ED with interventional radiology who recommended transfer to cath capable facility for non emergent cath.  The patient denies any chest pain.  She was started on brilenta, aspirin, heparin drip.    Past medical history:   Please see H&P for details    Special Studies:  5/27/24 PE with CT Abdomen and Pelvis:   1.  No pulmonary embolism.  2.  Large right lower lobe consolidation and patchy left-sided opacities with endoluminal debris, consistent with pneumonia.  3.  Retained debris in the esophagus. Given the lung opacities and endoluminal debris, aspiration should also be considered.  4.  Small right pleural effusion.    Social/Education/Vocational Hx:  Pt lives with family    Swallow Information   Prior speech/swallowing tx: none  Current Risks for Dysphagia &  Aspiration:  Edmondson's esophagus, GERD  Current Symptoms/Concerns: coughing with po and pneumonia  Current Diet: regular diet and thin liquids   Baseline Diet: regular diet and thin liquids  Takes pills- whole in applesauce    Baseline Assessment   Behavior/Cognition: alert  Speech/Language Status: able to participate in conversation and able to follow commands  Patient Positioning: upright in bed     Swallow Mechanism Exam   Facial: symmetrical  Labial: WFL  Lingual: WFL  Velum: unable to visualize  Mandible: adequate ROM  Dentition: adequate  Vocal quality:clear/adequate   Volitional Cough: strong/productive   Respiratory: RA    Consistencies Assessed and Performance   Consistencies Administered: thin liquids, nectar thick, puree, soft solids, hard solids, and meds whole in puree    Oral Stage: Pt self fed and eggs and fruit. Full spoon clearance noted while taking meds via puree. Pt exhibited slow but effective mastication of small bites of solids. Pt drank NTL by cup sip. No labial leakage noted for solid nor liquids. Oral control was WNL for both liquids and solids. Oral transfer was slightly delayed for solids, but was prompt for liquids.     Pharyngeal Stage: Swallow initiation was WFL for liquids, but mildly delayed for solids. Laryngeal excursion was judged to be complete via palpation. Excess coughing observed after swallowing solids, some following NT orange juice. Pt notes that she does not usually drink orange juice due to her reflux. No coughing after thin water.       Esophageal Concerns:  history of GERD. Pt noted with delayed coughing after OJ and citrus fruit.      Results Reviewed with: patient and RN

## 2024-05-30 NOTE — ASSESSMENT & PLAN NOTE
Had been on fosinopril as an out-patient, which was held for cardiac catheterization.    Patient was also started on metoprolol secondary to NSVT.  Blood pressures have remained stable and acceptable.

## 2024-05-30 NOTE — ASSESSMENT & PLAN NOTE
Tachycardia, leukocytosis (recently completed course of prednisone on the weekend).  Lactate normal.  Procalcitonin normal.   Source: RLL pneumonia (suspect possible viral)  Antibiotics: completed z vale as outpatient.  Cefepime, vancomycin in ED. transitioned to ceftriaxone.  Concern for aspiration given patient's history of Edmondson's esophagus and speech evaluation.  WBCs are uptrending despite IV ceftriaxone.  Awaiting pulmonary evaluation.  Likely will need to escalate antibiotics.  Urine antigens negative  Blood cultures: Negative at 48 hours

## 2024-05-30 NOTE — ASSESSMENT & PLAN NOTE
Patient noted with severely reduced EF on echocardiogram at 30%.  Patient has been fitted with a LifeVest.  Follow-up with cardiology as an outpatient.

## 2024-05-30 NOTE — PROGRESS NOTES
[facility]    Cardiology Progress Note  Noble Chou 78 y.o. female   YOB: 1945 MRN: [id number]  Unit/Bed#: S -01 Encounter: [id number]      Subjective:   No complaints of chest pain, coughing is improving.    Assessments  78-year-old female with ongoing issues with cough and shortness of breath for the last 2 weeks, has been treated as pneumonia outpatient. She is continuing to have worsening coughing and mild shortness of breath, and had a fever and ended up coming into the hospital at [facility] last night. She also had abdominal pain as well. At [facility] ED, she was found to have elevated troponin and as result was transferred for cardiac catheterization     Principal Problem:    Pneumonia  Active Problems:    Edmondson's esophagus without dysplasia    Pure hypercholesterolemia    Elevated troponin level not due myocardial infarction    Sepsis (HCC)    Abnormal CT scan    Compression deformity of vertebra    Primary hypertension    NSVT (nonsustained ventricular tachycardia) ([facility])    Nonischemic cardiomyopathy ([facility])    Non-MI troponin elevation  New onset cardiomyopathy, LVEF 30%  Frequent NSVT  Hypertension  Hyperlipidemia  LBBB  Known since at least 4 years, and had seen a cardiologist at [place], but apparently did not have an echocardiogram at that time  Pneumonia        Plan:  [facility] -no significant obstructive CAD  Likely nonischemic cardiomyopathy, possibly viral myocarditis  Optimize medical therapy  Continue metoprolol 50 q8h --> transition to metoprolol succinate 75 mg bid at discharge  Add Entresto 24/26 bid - check cost  NSVT improved on amiodarone and BB  Change to PO amiodarone load, and plan to use for short course over next 3 months  Check TSH, CMP in 3 months  With severely reduced EF, frequent NSVT's, nonischemic cardiomyopathy needing medical therapy, she will benefit from LifeVest protection at discharge  LifeVest arrangements made, and now at bedside  "for patient to use at discharge       Review of Systems   All other systems reviewed and are negative.      Telemetry Review: No further NSVT since yesterday morning. Did have sinus bradycardia overnight    Objective:   Vitals: Blood pressure 138/83, pulse 58, temperature 97.6 °F (36.4 °C), resp. rate 18, height 5' 1.75\" (1.568 m), weight 59 kg (130 lb 1.1 oz), SpO2 94%., Body mass index is 23.98 kg/m².,   Orthostatic Blood Pressures      Flowsheet Row Most Recent Value   Blood Pressure 138/83 filed at 2024 0737           Systolic (24hrs), Av , Min:109 , Max:138     Diastolic (24hrs), Av, Min:65, Max:99    Wt Readings from Last 5 Encounters:   24 59 kg (130 lb 1.1 oz)   24 59 kg (130 lb)   24 59.1 kg (130 lb 6.4 oz)   24 58.6 kg (129 lb 3.2 oz)   10/03/22 61.2 kg (135 lb)     I/O          0701   0700  0701   0700  0701   0700    P.O. 360 0     Total Intake(mL/kg) 360 (6.1) 0 (0)     Urine (mL/kg/hr) 200 (0.1) 0 (0)     Total Output 200 0     Net +160 0                        Physical Exam  Vitals and nursing note reviewed.   Constitutional:       General: She is not in acute distress.     Appearance: Normal appearance. She is well-developed. She is not ill-appearing.   HENT:      Head: Normocephalic and atraumatic.      Nose: No congestion.   Eyes:      General: No scleral icterus.     Conjunctiva/sclera: Conjunctivae normal.   Neck:      Vascular: No carotid bruit or JVD.   Cardiovascular:      Rate and Rhythm: Normal rate and regular rhythm.      Pulses: Normal pulses.      Heart sounds: Normal heart sounds. No murmur heard.     No friction rub. No gallop.   Pulmonary:      Effort: Pulmonary effort is normal. No respiratory distress.      Breath sounds: Normal breath sounds. No rales.   Abdominal:      General: There is no distension.      Palpations: Abdomen is soft.      Tenderness: There is no abdominal tenderness.   Musculoskeletal:         " General: No swelling or tenderness.      Cervical back: Neck supple.      Right lower leg: No edema.      Left lower leg: No edema.   Skin:     General: Skin is warm.   Neurological:      General: No focal deficit present.      Mental Status: She is alert and oriented to person, place, and time. Mental status is at baseline.   Psychiatric:         Mood and Affect: Mood normal.         Behavior: Behavior normal.         Thought Content: Thought content normal.         Laboratory Results: personally reviewed        CBC with diff:   Results from last 7 days   Lab Units 05/30/24 0542 05/29/24  0549 05/28/24 0824 05/27/24  1642   WBC Thousand/uL 14.73* 13.58* 12.77* 18.78*   HEMOGLOBIN g/dL 11.3* 11.4* 11.6 12.1   HEMATOCRIT % 34.5* 34.9 35.6 38.6   MCV fL 85 85 84 86   PLATELETS Thousands/uL 465* 432* 399* 415*   RBC Million/uL 4.08 4.13 4.23 4.49   MCH pg 27.7 27.6 27.4 26.9   MCHC g/dL 32.8 32.7 32.6 31.3*   RDW % 14.6 14.4 14.2 13.9   MPV fL 9.3 9.2 9.2 9.1   NRBC AUTO /100 WBCs 0  --  0 0         CMP:  Results from last 7 days   Lab Units 05/30/24 0542 05/29/24  0549 05/28/24 0824 05/27/24  1642   POTASSIUM mmol/L 4.0 3.5 3.7 3.7   CHLORIDE mmol/L 107 103 101 100   CO2 mmol/L 17* 21 23 23   BUN mg/dL 22 18 17 16   CREATININE mg/dL 0.77 0.65 0.60 0.75   CALCIUM mg/dL 7.6* 8.0* 8.4 8.8   AST U/L  --   --   --  24   ALT U/L  --   --   --  23   ALK PHOS U/L  --   --   --  108*   EGFR ml/min/1.73sq m 73 84 86 75         BMP:  Results from last 7 days   Lab Units 05/30/24 0542 05/29/24  0549 05/28/24 0824 05/27/24  1642   POTASSIUM mmol/L 4.0 3.5 3.7 3.7   CHLORIDE mmol/L 107 103 101 100   CO2 mmol/L 17* 21 23 23   BUN mg/dL 22 18 17 16   CREATININE mg/dL 0.77 0.65 0.60 0.75   CALCIUM mg/dL 7.6* 8.0* 8.4 8.8       BNP:   Recent Labs     05/27/24  1642   *       Magnesium:   Results from last 7 days   Lab Units 05/28/24  0824   MAGNESIUM mg/dL 2.5       Coags:   Results from last 7 days   Lab Units  05/29/24  0549 05/28/24  2239 05/28/24  1542 05/28/24  0824 05/28/24  0022 05/27/24  1642   PTT seconds 76* 70* 66* 58* 47* 29   INR   --   --   --   --   --  1.29*       TSH:        Hemoglobin A1C       Lipid Profile:   Results from last 7 days   Lab Units 05/28/24  0824   TRIGLYCERIDES mg/dL 116   HDL mg/dL 49*       Meds/Allergies   all current active meds have been reviewed and current meds:   Current Facility-Administered Medications   Medication Dose Route Frequency    acetaminophen (TYLENOL) tablet 650 mg  650 mg Oral Q6H PRN    albuterol inhalation solution 2.5 mg  2.5 mg Nebulization Q6H PRN    amiodarone (CORDARONE) 900 mg in dextrose 5 % 500 mL infusion  0.5 mg/min Intravenous Continuous    aspirin chewable tablet 81 mg  81 mg Oral Daily    atorvastatin (LIPITOR) tablet 80 mg  80 mg Oral Daily With Dinner    benzonatate (TESSALON PERLES) capsule 100 mg  100 mg Oral TID    ceftriaxone (ROCEPHIN) 1 g/50 mL in dextrose IVPB  1,000 mg Intravenous Q24H    co-enzyme Q-10 capsule 30 mg  30 mg Oral TID    enoxaparin (LOVENOX) subcutaneous injection 40 mg  40 mg Subcutaneous Q24H    guaiFENesin (MUCINEX) 12 hr tablet 1,200 mg  1,200 mg Oral Q12H DEVON    lidocaine (LIDODERM) 5 % patch 2 patch  2 patch Topical Daily    metoprolol tartrate (LOPRESSOR) tablet 50 mg  50 mg Oral Q8H    pantoprazole (PROTONIX) EC tablet 40 mg  40 mg Oral Early Morning       Medications Prior to Admission:     co-enzyme Q-10 30 MG capsule    Cranberry 300 MG tablet    denosumab (PROLIA) 60 mg/mL    fosinopril (MONOPRIL) 10 mg tablet    Multiple Vitamin (multivitamin) tablet    pantoprazole (PROTONIX) 40 mg tablet    pravastatin (PRAVACHOL) 40 mg tablet    TURMERIC PO  amiodarone (CORDARONE) 900 mg in dextrose 5 % 500 mL infusion, 0.5 mg/min, Last Rate: 0.5 mg/min (05/29/24 0719)          Cardiac testing: reviewed  No results found for this or any previous visit.    No results found for this or any previous visit.    No results found for  this or any previous visit.    No results found for this or any previous visit.

## 2024-05-30 NOTE — RESPIRATORY THERAPY NOTE
05/30/24 1332   Respiratory Protocol   Protocol Initiated? Yes   Protocol Selection Airway Clearance   Language Barrier? No   Medical & Social History Reviewed? Yes   Diagnostic Studies Reviewed? Yes   Physical Assessment Performed? Yes   Airway Clearance Plan Flutter   Respiratory Plan Mild Distress pathway   Respiratory Assessment   Resp Comments TT recieved by pulmonary for AC protocol and to give patient another flutter valve. Patient given flutter, currently eating so she did not want to try it. However, was reeducated on how to use device.

## 2024-05-30 NOTE — ASSESSMENT & PLAN NOTE
Age indetermine L3 compression deformity   Has occasional mild low back pain, none currently.  Patient with low calcium levels and likely has underlying osteopenia or osteoporosis and may benefit from an out-patient DEXA scan.

## 2024-05-30 NOTE — ASSESSMENT & PLAN NOTE
Patient noted to have multiple short runs of NSVT.  Cardiology has uptitrated her metoprolol dose.  She is maintained on amiodarone infusion.  Appreciate cardiology's recommendations to transition to p.o. regimen.

## 2024-05-30 NOTE — ASSESSMENT & PLAN NOTE
Presented to ED with abdominal pain, cough.  CT: RLL pneumonia. Small right pleural effusion. Patchy left sided opacities with endoluminal debris.  Consideration for aspiration.   Antibiotics   Completed Z vale as outpatient  Cefepime, vancomycin in ED. Transition to ceftriaxone and doxycycline.  Doxycycline was discontinued with normal urinary antigens.  Patient remains on ceftriaxone however WBCs are up trending.  Tessalon TID, mucinex BID  Respiratory protocol placed - patient declined breathing treatments  Pulmonary consulted and appreciate their recommendations.  Patient may require bronchoscopy based upon findings of debris in the airways.

## 2024-05-31 LAB
ANION GAP SERPL CALCULATED.3IONS-SCNC: 9 MMOL/L (ref 4–13)
BASOPHILS # BLD AUTO: 0.02 THOUSANDS/ÂΜL (ref 0–0.1)
BASOPHILS NFR BLD AUTO: 0 % (ref 0–1)
BUN SERPL-MCNC: 22 MG/DL (ref 5–25)
CALCIUM SERPL-MCNC: 7.9 MG/DL (ref 8.4–10.2)
CHLORIDE SERPL-SCNC: 107 MMOL/L (ref 96–108)
CO2 SERPL-SCNC: 19 MMOL/L (ref 21–32)
CREAT SERPL-MCNC: 0.71 MG/DL (ref 0.6–1.3)
EOSINOPHIL # BLD AUTO: 0 THOUSAND/ÂΜL (ref 0–0.61)
EOSINOPHIL NFR BLD AUTO: 0 % (ref 0–6)
ERYTHROCYTE [DISTWIDTH] IN BLOOD BY AUTOMATED COUNT: 14.6 % (ref 11.6–15.1)
GFR SERPL CREATININE-BSD FRML MDRD: 80 ML/MIN/1.73SQ M
GLUCOSE SERPL-MCNC: 126 MG/DL (ref 65–140)
HCT VFR BLD AUTO: 34.1 % (ref 34.8–46.1)
HGB BLD-MCNC: 11.4 G/DL (ref 11.5–15.4)
IMM GRANULOCYTES # BLD AUTO: 0.15 THOUSAND/UL (ref 0–0.2)
IMM GRANULOCYTES NFR BLD AUTO: 1 % (ref 0–2)
LYMPHOCYTES # BLD AUTO: 1.57 THOUSANDS/ÂΜL (ref 0.6–4.47)
LYMPHOCYTES NFR BLD AUTO: 10 % (ref 14–44)
MCH RBC QN AUTO: 27.6 PG (ref 26.8–34.3)
MCHC RBC AUTO-ENTMCNC: 33.4 G/DL (ref 31.4–37.4)
MCV RBC AUTO: 83 FL (ref 82–98)
MONOCYTES # BLD AUTO: 1.04 THOUSAND/ÂΜL (ref 0.17–1.22)
MONOCYTES NFR BLD AUTO: 7 % (ref 4–12)
NEUTROPHILS # BLD AUTO: 12.78 THOUSANDS/ÂΜL (ref 1.85–7.62)
NEUTS SEG NFR BLD AUTO: 82 % (ref 43–75)
NRBC BLD AUTO-RTO: 0 /100 WBCS
PLATELET # BLD AUTO: 420 THOUSANDS/UL (ref 149–390)
PMV BLD AUTO: 9.2 FL (ref 8.9–12.7)
POTASSIUM SERPL-SCNC: 3.7 MMOL/L (ref 3.5–5.3)
RBC # BLD AUTO: 4.13 MILLION/UL (ref 3.81–5.12)
SODIUM SERPL-SCNC: 135 MMOL/L (ref 135–147)
WBC # BLD AUTO: 15.56 THOUSAND/UL (ref 4.31–10.16)

## 2024-05-31 PROCEDURE — 99232 SBSQ HOSP IP/OBS MODERATE 35: CPT | Performed by: INTERNAL MEDICINE

## 2024-05-31 PROCEDURE — 97116 GAIT TRAINING THERAPY: CPT

## 2024-05-31 PROCEDURE — 99232 SBSQ HOSP IP/OBS MODERATE 35: CPT | Performed by: HOSPITALIST

## 2024-05-31 PROCEDURE — 80048 BASIC METABOLIC PNL TOTAL CA: CPT | Performed by: PHYSICIAN ASSISTANT

## 2024-05-31 PROCEDURE — 85025 COMPLETE CBC W/AUTO DIFF WBC: CPT | Performed by: PHYSICIAN ASSISTANT

## 2024-05-31 PROCEDURE — 97163 PT EVAL HIGH COMPLEX 45 MIN: CPT

## 2024-05-31 RX ORDER — METOPROLOL SUCCINATE 50 MG/1
50 TABLET, EXTENDED RELEASE ORAL EVERY 12 HOURS
Status: DISCONTINUED | OUTPATIENT
Start: 2024-05-31 | End: 2024-06-03 | Stop reason: HOSPADM

## 2024-05-31 RX ADMIN — BENZONATATE 100 MG: 100 CAPSULE ORAL at 09:14

## 2024-05-31 RX ADMIN — GUAIFENESIN 1200 MG: 600 TABLET ORAL at 21:28

## 2024-05-31 RX ADMIN — AMIODARONE HYDROCHLORIDE 200 MG: 200 TABLET ORAL at 17:30

## 2024-05-31 RX ADMIN — SACUBITRIL AND VALSARTAN 1 TABLET: 24; 26 TABLET, FILM COATED ORAL at 09:13

## 2024-05-31 RX ADMIN — AMIODARONE HYDROCHLORIDE 200 MG: 200 TABLET ORAL at 09:14

## 2024-05-31 RX ADMIN — Medication 30 MG: at 21:28

## 2024-05-31 RX ADMIN — Medication 30 MG: at 17:31

## 2024-05-31 RX ADMIN — Medication 30 MG: at 09:14

## 2024-05-31 RX ADMIN — ASPIRIN 81 MG CHEWABLE TABLET 81 MG: 81 TABLET CHEWABLE at 09:14

## 2024-05-31 RX ADMIN — SACUBITRIL AND VALSARTAN 1 TABLET: 24; 26 TABLET, FILM COATED ORAL at 17:31

## 2024-05-31 RX ADMIN — LIDOCAINE 5% 2 PATCH: 700 PATCH TOPICAL at 06:22

## 2024-05-31 RX ADMIN — CEFTRIAXONE SODIUM 1000 MG: 10 INJECTION, POWDER, FOR SOLUTION INTRAVENOUS at 06:52

## 2024-05-31 RX ADMIN — METOPROLOL SUCCINATE 50 MG: 50 TABLET, EXTENDED RELEASE ORAL at 21:28

## 2024-05-31 RX ADMIN — METOPROLOL SUCCINATE 75 MG: 50 TABLET, EXTENDED RELEASE ORAL at 09:15

## 2024-05-31 RX ADMIN — GUAIFENESIN 1200 MG: 600 TABLET ORAL at 09:14

## 2024-05-31 RX ADMIN — ATORVASTATIN CALCIUM 80 MG: 40 TABLET, FILM COATED ORAL at 17:31

## 2024-05-31 RX ADMIN — BENZONATATE 100 MG: 100 CAPSULE ORAL at 21:28

## 2024-05-31 RX ADMIN — AMIODARONE HYDROCHLORIDE 200 MG: 200 TABLET ORAL at 13:06

## 2024-05-31 RX ADMIN — ENOXAPARIN SODIUM 40 MG: 40 INJECTION SUBCUTANEOUS at 13:07

## 2024-05-31 RX ADMIN — PANTOPRAZOLE SODIUM 40 MG: 40 TABLET, DELAYED RELEASE ORAL at 06:22

## 2024-05-31 RX ADMIN — BENZONATATE 100 MG: 100 CAPSULE ORAL at 17:31

## 2024-05-31 NOTE — ASSESSMENT & PLAN NOTE
Ct abd/pelvis showed left renal lesion which will need to be f.up as outpt.   It also showed a 2.4 x 2.1 cm soft tissue density which is partially surrounded by a rim of fluid around uterus, will order pelvic ultrasound to further evaluate.

## 2024-05-31 NOTE — ASSESSMENT & PLAN NOTE
Life vest at bedside. [facility] on this admit. Showed NICM. Started on GDMT, cards following.

## 2024-05-31 NOTE — PLAN OF CARE
Problem: PHYSICAL THERAPY ADULT  Goal: Performs mobility at highest level of function for planned discharge setting.  See evaluation for individualized goals.  Description: Treatment/Interventions: Functional transfer training, ADL retraining, LE strengthening/ROM, Elevations, Therapeutic exercise, Endurance training, Patient/family training, Equipment eval/education, Bed mobility, Gait training, Compensatory technique education, Spoke to case management, Family          See flowsheet documentation for full assessment, interventions and recommendations.  Note:    Problem List: Decreased strength, Decreased endurance, Impaired balance, Decreased mobility, Decreased safety awareness  Assessment: Patient seen for Physical Therapy evaluation. Patient admitted with Pneumonia.  Comorbidities affecting patient's physical performance include: sepsis, BBB, NSVT, nonischemic cardiomyopathy, R ASHLYN, compression deformity of vertebra.  Personal factors affecting patient at time of initial evaluation include: lives in two story house, stairs to enter home, inability to navigate community distances, inability to navigate level surfaces without external assistance, and inability to perform dynamic tasks in community. Prior to admission, patient was independent with functional mobility without assistive device, independent with ADLS, independent with IADLS, living with spouse in a two level home with 2 steps to enter, ambulating household distance, and ambulating community distances.  Please find objective findings from Physical Therapy assessment regarding body systems outlined above with impairments and limitations including weakness, impaired balance, decreased endurance, impaired coordination, gait deviations, decreased activity tolerance, decreased functional mobility tolerance, decreased safety awareness, fall risk, and SOB upon exertion.  The Barthel Index was used as a functional outcome tool presenting with a score of  Barthel Index Score: 55 today indicating marked limitations of functional mobility and ADLS.  Patient's clinical presentation is currently unstable/unpredictable as seen in patient's presentation of vital sign response, changing level of pain, increased fall risk, new onset of impairment of functional mobility, decreased endurance, and new onset of weakness. Pt would benefit from continued Physical Therapy treatment to address deficits as defined above and maximize level of functional mobility. As demonstrated by objective findings, the assigned level of complexity for this evaluation is high.The patient's AM-[facility] Basic Mobility Inpatient Short Form Raw Score is 18. A Raw score of greater than 16 suggests the patient may benefit from discharge to home. Please also refer to the recommendation of the Physical Therapist for safe discharge planning.        Rehab Resource Intensity Level, PT: III (Minimum Resource Intensity)    See flowsheet documentation for full assessment.

## 2024-05-31 NOTE — ASSESSMENT & PLAN NOTE
Currently on ceftriaxone day 4/7. Reports improvement in sx, but continues to have cough and dyspnea with minimal exertion.   Cont abx.   WBC continues to uptrend, however, no fevers and normal procal. Will complete 7 day course of abx.   Appreciate respiratory vest therapy.   Cont tessalon perles with mucinex.

## 2024-05-31 NOTE — PROGRESS NOTES
[facility]    Cardiology Progress Note  Noble Chou 78 y.o. female   YOB: 1945 MRN: [id number]  Unit/Bed#: S -Daniela Encounter: [id number]      Subjective:   No complaints of chest pain, coughing is improving.    Assessments  78-year-old female with ongoing issues with cough and shortness of breath for the last 2 weeks, has been treated as pneumonia outpatient. She is continuing to have worsening coughing and mild shortness of breath, and had a fever and ended up coming into the hospital at [facility] last night. She also had abdominal pain as well. At [facility] ED, she was found to have elevated troponin and as result was transferred for cardiac catheterization     Principal Problem:    Pneumonia  Active Problems:    Edmondson's esophagus without dysplasia    Pure hypercholesterolemia    Elevated troponin level not due myocardial infarction    Sepsis ([facility])    Abnormal CT scan    Compression deformity of vertebra    Primary hypertension    NSVT (nonsustained ventricular tachycardia) ([facility])    Nonischemic cardiomyopathy ([facility])    Non-MI troponin elevation  New onset cardiomyopathy, LVEF 30%  Frequent NSVT  Hypertension  Hyperlipidemia  LBBB  Known since at least 4 years, and had seen a cardiologist at [place], but apparently did not have an echocardiogram at that time  Pneumonia        Plan:  [facility] -no significant obstructive CAD  Likely nonischemic cardiomyopathy, possibly viral myocarditis  Optimize medical therapy  Currently on metoprolol succinate 75 mg twice daily --> heart rate overnight was down to low 40s on briefly 38, and she does report mild dizziness with ambulation at times --> decrease metoprolol XL to 50 mg bid  Continue Entresto 24/26 bid - check cost  NSVT much improved on amiodarone and BB  Continue PO amiodarone load (200 mg tid until 5/30, then decrease to 200 mg bid for 1 week (until 6/6/24), and then continue 200 mg daily  plan to use for short course over  "next 3 months  TSH, CMP - normal  Follow up TSH, CMP in 3 months outpatient  With severely reduced EF, frequent NSVT's, nonischemic cardiomyopathy needing medical therapy, she will benefit from LifeVest protection at discharge  LifeVest arrangements made, and now at bedside for patient to use at discharge     She is okay for discharge from a cardiac standpoint.  She will need to be set up for outpatient follow-up at our [facility] office.  I have sent a message out to the office to help set up follow-up    Review of Systems   All other systems reviewed and are negative.      Telemetry Review: Sinus bradycardia with heart rate down to 38-50 on multiple occasions through the night.    Objective:   Vitals: Blood pressure 120/60, pulse 64, temperature 97.8 °F (36.6 °C), temperature source Oral, resp. rate 18, height 5' 1.75\" (1.568 m), weight 59 kg (130 lb 1.1 oz), SpO2 94%., Body mass index is 23.98 kg/m².,   Orthostatic Blood Pressures      Flowsheet Row Most Recent Value   Blood Pressure 120/60 filed at 2024 0741   Patient Position - Orthostatic VS Sitting filed at 2024 0741           Systolic (24hrs), Av , Min:120 , Max:134     Diastolic (24hrs), Av, Min:60, Max:79    Wt Readings from Last 5 Encounters:   24 59 kg (130 lb 1.1 oz)   24 59 kg (130 lb)   24 59.1 kg (130 lb 6.4 oz)   24 58.6 kg (129 lb 3.2 oz)   10/03/22 61.2 kg (135 lb)     I/O          07 0700  0701   0700  0701   0700    P.O. 360 0     Total Intake(mL/kg) 360 (6.1) 0 (0)     Urine (mL/kg/hr) 200 (0.1) 0 (0)     Total Output 200 0     Net +160 0                        Physical Exam  Vitals and nursing note reviewed.   Constitutional:       General: She is not in acute distress.     Appearance: Normal appearance. She is well-developed. She is not ill-appearing.   HENT:      Head: Normocephalic and atraumatic.      Nose: No congestion.   Eyes:      General: No scleral " icterus.     Conjunctiva/sclera: Conjunctivae normal.   Neck:      Vascular: No carotid bruit or JVD.   Cardiovascular:      Rate and Rhythm: Normal rate and regular rhythm.      Pulses: Normal pulses.      Heart sounds: Normal heart sounds. No murmur heard.     No friction rub. No gallop.   Pulmonary:      Effort: Pulmonary effort is normal. No respiratory distress.      Breath sounds: Normal breath sounds. No rales.   Abdominal:      General: There is no distension.      Palpations: Abdomen is soft.      Tenderness: There is no abdominal tenderness.   Musculoskeletal:         General: No swelling or tenderness.      Cervical back: Neck supple.      Right lower leg: No edema.      Left lower leg: No edema.   Skin:     General: Skin is warm.   Neurological:      General: No focal deficit present.      Mental Status: She is alert and oriented to person, place, and time. Mental status is at baseline.   Psychiatric:         Mood and Affect: Mood normal.         Behavior: Behavior normal.         Thought Content: Thought content normal.         Laboratory Results: personally reviewed        CBC with diff:   Results from last 7 days   Lab Units 05/31/24  0451 05/30/24  0542 05/29/24  0549 05/28/24  0824 05/27/24  1642   WBC Thousand/uL 15.56* 14.73* 13.58* 12.77* 18.78*   HEMOGLOBIN g/dL 11.4* 11.3* 11.4* 11.6 12.1   HEMATOCRIT % 34.1* 34.5* 34.9 35.6 38.6   MCV fL 83 85 85 84 86   PLATELETS Thousands/uL 420* 465* 432* 399* 415*   RBC Million/uL 4.13 4.08 4.13 4.23 4.49   MCH pg 27.6 27.7 27.6 27.4 26.9   MCHC g/dL 33.4 32.8 32.7 32.6 31.3*   RDW % 14.6 14.6 14.4 14.2 13.9   MPV fL 9.2 9.3 9.2 9.2 9.1   NRBC AUTO /100 WBCs 0 0  --  0 0         CMP:  Results from last 7 days   Lab Units 05/31/24  0451 05/30/24  0542 05/29/24  0549 05/28/24  0824 05/27/24  1642   POTASSIUM mmol/L 3.7 4.0 3.5 3.7 3.7   CHLORIDE mmol/L 107 107 103 101 100   CO2 mmol/L 19* 17* 21 23 23   BUN mg/dL 22 22 18 17 16   CREATININE mg/dL 0.71 0.77  "0.65 0.60 0.75   CALCIUM mg/dL 7.9* 7.6* 8.0* 8.4 8.8   AST U/L  --   --   --   --  24   ALT U/L  --   --   --   --  23   ALK PHOS U/L  --   --   --   --  108*   EGFR ml/min/1.73sq m 80 73 84 86 75         BMP:  Results from last 7 days   Lab Units 05/31/24  0451 05/30/24  0542 05/29/24  0549 05/28/24  0824 05/27/24  1642   POTASSIUM mmol/L 3.7 4.0 3.5 3.7 3.7   CHLORIDE mmol/L 107 107 103 101 100   CO2 mmol/L 19* 17* 21 23 23   BUN mg/dL 22 22 18 17 16   CREATININE mg/dL 0.71 0.77 0.65 0.60 0.75   CALCIUM mg/dL 7.9* 7.6* 8.0* 8.4 8.8       BNP:   No results for input(s): \"BNP\" in the last 72 hours.      Magnesium:   Results from last 7 days   Lab Units 05/28/24  0824   MAGNESIUM mg/dL 2.5       Coags:   Results from last 7 days   Lab Units 05/29/24  0549 05/28/24  2239 05/28/24  1542 05/28/24  0824 05/28/24  0022 05/27/24  1642   PTT seconds 76* 70* 66* 58* 47* 29   INR   --   --   --   --   --  1.29*       TSH:        Hemoglobin A1C       Lipid Profile:   Results from last 7 days   Lab Units 05/28/24  0824   TRIGLYCERIDES mg/dL 116   HDL mg/dL 49*       Meds/Allergies   all current active meds have been reviewed and current meds:   Current Facility-Administered Medications   Medication Dose Route Frequency    acetaminophen (TYLENOL) tablet 650 mg  650 mg Oral Q6H PRN    albuterol inhalation solution 2.5 mg  2.5 mg Nebulization Q6H PRN    amiodarone tablet 200 mg  200 mg Oral TID With Meals    Followed by    [START ON 6/6/2024] amiodarone tablet 200 mg  200 mg Oral BID With Meals    Followed by    [START ON 6/14/2024] amiodarone tablet 200 mg  200 mg Oral Daily With Breakfast    aspirin chewable tablet 81 mg  81 mg Oral Daily    atorvastatin (LIPITOR) tablet 80 mg  80 mg Oral Daily With Dinner    benzonatate (TESSALON PERLES) capsule 100 mg  100 mg Oral TID    ceftriaxone (ROCEPHIN) 1 g/50 mL in dextrose IVPB  1,000 mg Intravenous Q24H    co-enzyme Q-10 capsule 30 mg  30 mg Oral TID    enoxaparin (LOVENOX) " subcutaneous injection 40 mg  40 mg Subcutaneous Q24H    guaiFENesin (MUCINEX) 12 hr tablet 1,200 mg  1,200 mg Oral Q12H DEVON    lidocaine (LIDODERM) 5 % patch 2 patch  2 patch Topical Daily    metoprolol tartrate (LOPRESSOR) tablet 50 mg  50 mg Oral Q8H    pantoprazole (PROTONIX) EC tablet 40 mg  40 mg Oral Early Morning    sacubitril-valsartan (ENTRESTO) 24-26 MG per tablet 1 tablet  1 tablet Oral BID       Medications Prior to Admission:     co-enzyme Q-10 30 MG capsule    Cranberry 300 MG tablet    denosumab (PROLIA) 60 mg/mL    fosinopril (MONOPRIL) 10 mg tablet    Multiple Vitamin (multivitamin) tablet    pantoprazole (PROTONIX) 40 mg tablet    pravastatin (PRAVACHOL) 40 mg tablet    TURMERIC PO           Cardiac testing: reviewed  No results found for this or any previous visit.    No results found for this or any previous visit.    No results found for this or any previous visit.    No results found for this or any previous visit.

## 2024-05-31 NOTE — PROGRESS NOTES
PULMONOLOGY PROGRESS NOTE     Name: Noble Chou   Age & Sex: 78 y.o. female   MRN: [id number]  Unit/Bed#: S -01   Encounter: [id number]    PATIENT INFORMATION     Name: Noble Chou   Age & Sex: 78 y.o. female   MRN: [id number]  Hospital Stay Days: 3    ASSESSMENT/PLAN     Assessment:   Patient is an 78-year-old female with a past medical history of hypertension, Edmondson's esophagus who was admitted on  for elevated troponin and suspected pneumonia.  Patient found to have newly depressed EF without significant obstructive coronary disease.  Pulmonology was consulted in the setting of suspected community-acquired pneumonia.    Community-acquired pneumonia  History of Edmondson's esophagus  Now on MI troponin elevation  New onset nonischemic cardiomyopathy, possibly viral  Frequent NSVT    Plan:  Cardiology following, planning for LifeVest prior to discharge  Continue antibiotics for 5-7 days total, may transition to cefuroxime or cefdinir to complete 5-day course  No need for additional coverage of atypicals  Defer bronchoscopy at this time, continue with pulmonary toilet with flutter valve, 3% nebs, and albuterol  Continue PPI  Will need chest x-ray in 8 to 12 weeks to monitor for resolution      SUBJECTIVE     Patient seen and examined. No acute events overnight. Improving from a respiratory standpoint. All other ROS negative at this time.    OBJECTIVE     Vitals:    24 1517 24 2107 24 0110 24 0741   BP: 134/79 129/78 124/76 120/60   BP Location:    Left arm   Pulse: 71 67 (!) 50 64   Resp: 18 18  18   Temp:  98.4 °F (36.9 °C)  97.8 °F (36.6 °C)   TempSrc:    Oral   SpO2: 94% 95%  94%   Weight:       Height:          Temperature:   Temp (24hrs), Av.1 °F (36.7 °C), Min:97.8 °F (36.6 °C), Max:98.4 °F (36.9 °C)    Temperature: 97.8 °F (36.6 °C)  Intake & Output:  I/O          0701   0700  0701   0700  0701   0700    P.O. 0 1565     Total Intake(mL/kg)  0 (0) 1565 (26.5)     Urine (mL/kg/hr) 0 (0) 1650 (1.2)     Total Output 0 1650     Net 0 -85                  Weights:   IBW (Ideal Body Weight): 49.53 kg    Body mass index is 23.98 kg/m².  Weight (last 2 days)       None          Physical Exam  Vitals reviewed.   Constitutional:       General: She is not in acute distress.  HENT:      Head: Normocephalic and atraumatic.      Right Ear: External ear normal.      Left Ear: External ear normal.      Nose: Nose normal.      Mouth/Throat:      Mouth: Mucous membranes are moist.      Pharynx: Oropharynx is clear.   Eyes:      Extraocular Movements: Extraocular movements intact.      Pupils: Pupils are equal, round, and reactive to light.   Cardiovascular:      Rate and Rhythm: Normal rate and regular rhythm.      Pulses: Normal pulses.      Heart sounds: Normal heart sounds.   Pulmonary:      Effort: Pulmonary effort is normal.      Breath sounds: Normal breath sounds.   Abdominal:      General: Abdomen is flat. Bowel sounds are normal.   Musculoskeletal:      Right lower leg: No edema.      Left lower leg: No edema.   Skin:     General: Skin is warm and dry.      Capillary Refill: Capillary refill takes less than 2 seconds.   Neurological:      General: No focal deficit present.      Mental Status: She is alert and oriented to person, place, and time.   Psychiatric:         Mood and Affect: Mood normal.         Behavior: Behavior normal.           LABORATORY DATA     Labs: I have personally reviewed pertinent reports.  Results from last 7 days   Lab Units 05/31/24 0451 05/30/24 0542 05/29/24  0549 05/28/24  0824   WBC Thousand/uL 15.56* 14.73* 13.58* 12.77*   HEMOGLOBIN g/dL 11.4* 11.3* 11.4* 11.6   HEMATOCRIT % 34.1* 34.5* 34.9 35.6   PLATELETS Thousands/uL 420* 465* 432* 399*   SEGS PCT % 82* 83*  --  84*   MONO PCT % 7 7  --  6   EOS PCT % 0 0  --  0      Results from last 7 days   Lab Units 05/31/24  0451 05/30/24  0542 05/29/24  0549 05/28/24  0824  05/27/24  1642   POTASSIUM mmol/L 3.7 4.0 3.5   < > 3.7   CHLORIDE mmol/L 107 107 103   < > 100   CO2 mmol/L 19* 17* 21   < > 23   BUN mg/dL 22 22 18   < > 16   CREATININE mg/dL 0.71 0.77 0.65   < > 0.75   CALCIUM mg/dL 7.9* 7.6* 8.0*   < > 8.8   ALK PHOS U/L  --   --   --   --  108*   ALT U/L  --   --   --   --  23   AST U/L  --   --   --   --  24    < > = values in this interval not displayed.     Results from last 7 days   Lab Units 05/28/24  0824   MAGNESIUM mg/dL 2.5          Results from last 7 days   Lab Units 05/29/24  0549 05/28/24  2239 05/28/24  1542 05/28/24  0022 05/27/24  1642   INR   --   --   --   --  1.29*   PTT seconds 76* 70* 66*   < > 29    < > = values in this interval not displayed.     Results from last 7 days   Lab Units 05/27/24  1642   LACTIC ACID mmol/L 1.4       Micro:   Results from last 7 days   Lab Units 05/28/24  0637 05/27/24  1642   BLOOD CULTURE   --  No Growth at 72 hrs.  No Growth at 72 hrs.   LEGIONELLA URINARY ANTIGEN  Negative  --    STREP PNEUMONIAE ANTIGEN, URINE  Negative  --          IMAGING & DIAGNOSTIC TESTING     Radiology Results: I have personally reviewed pertinent reports.  No results found.  Other Diagnostic Testing: I have personally reviewed pertinent reports.    ACTIVE MEDICATIONS     Current Facility-Administered Medications   Medication Dose Route Frequency    acetaminophen (TYLENOL) tablet 650 mg  650 mg Oral Q6H PRN    albuterol inhalation solution 2.5 mg  2.5 mg Nebulization Q6H PRN    amiodarone tablet 200 mg  200 mg Oral TID With Meals    Followed by    [START ON 6/6/2024] amiodarone tablet 200 mg  200 mg Oral BID With Meals    Followed by    [START ON 6/14/2024] amiodarone tablet 200 mg  200 mg Oral Daily With Breakfast    aspirin chewable tablet 81 mg  81 mg Oral Daily    atorvastatin (LIPITOR) tablet 80 mg  80 mg Oral Daily With Dinner    benzonatate (TESSALON PERLES) capsule 100 mg  100 mg Oral TID    ceftriaxone (ROCEPHIN) 1 g/50 mL in dextrose IVPB  " 1,000 mg Intravenous Q24H    co-enzyme Q-10 capsule 30 mg  30 mg Oral TID    enoxaparin (LOVENOX) subcutaneous injection 40 mg  40 mg Subcutaneous Q24H    guaiFENesin (MUCINEX) 12 hr tablet 1,200 mg  1,200 mg Oral Q12H DEVON    lidocaine (LIDODERM) 5 % patch 2 patch  2 patch Topical Daily    metoprolol tartrate (LOPRESSOR) tablet 50 mg  50 mg Oral Q8H    pantoprazole (PROTONIX) EC tablet 40 mg  40 mg Oral Early Morning    sacubitril-valsartan (ENTRESTO) 24-26 MG per tablet 1 tablet  1 tablet Oral BID         Disclaimer: Portions of the record may have been created with voice recognition software. Occasional wrong word or \"sound a like\" substitutions may have occurred due to the inherent limitations of voice recognition software. Careful consideration should be taken to recognize, using context, where substitutions have occurred.    Lonny Thorne DO, MS  Pulmonary and Critical Care Fellow, PGY-IV  [facility]    "

## 2024-05-31 NOTE — CASE MANAGEMENT
Case Management Discharge Planning Note    Patient name Noble Chou  Location S /S -01 MRN [id number]  : 1945 Date 2024       Current Admission Date: 2024  Current Admission Diagnosis:Pneumonia   Patient Active Problem List    Diagnosis Date Noted Date Diagnosed    NSVT (nonsustained ventricular tachycardia) ([facility]) 2024     Nonischemic cardiomyopathy (HCC) 2024     Primary hypertension 2024     Elevated troponin level not due myocardial infarction 2024     Sepsis (HCC) 2024     Pneumonia 2024     Abnormal CT scan 2024     Compression deformity of vertebra 2024     Bundle-branch block      Gastroesophageal reflux disease with esophagitis without hemorrhage 2020     History of colon polyps 2020     History of total right hip replacement 2019     Right upper quadrant abdominal pain 2019     Edmondson's esophagus without dysplasia 2019     Constipation 2019     Pure hypercholesterolemia 2019       LOS (days): 3  Geometric Mean LOS (GMLOS) (days): 5.4  Days to GMLOS:1.9     OBJECTIVE:  Risk of Unplanned Readmission Score: 11.86         Current admission status: Inpatient   Preferred Pharmacy:   [id number] [place] - [address]  [address] [place] [address]  Phone: [phone] Fax: [phone]    [id number] - [facility] [place] - [address]  [address] [facility] [address]  Phone: [phone] Fax: [phone]    Primary Care Provider: Angela Pehlps MD    Primary Insurance: [employer] REP  Secondary Insurance:     DISCHARGE DETAILS:    Discharge planning discussed with:: patient's son, Laureano, outside of room        CM contacted family/caregiver?: Yes  Were Treatment Team discharge recommendations reviewed with patient/caregiver?: Yes  Did patient/caregiver verbalize understanding of patient care needs?: Yes  Were patient/caregiver  advised of the risks associated with not following Treatment Team discharge recommendations?: Yes    Contacts  Patient Contacts: anisha Tinsley  Relationship to Patient:: Family  Contact Method: In Person  Reason/Outcome: Emergency Contact, Discharge Planning              Other Referral/Resources/Interventions Provided:  Referral Comments: Met with patient's son, Laureano, outside of room, as he had questions re: patient's anticipated d/c. Relays that patient is stating she'll be d/c'd tomorrow and he has concerns. Reviewed SLIM's notes from today which report anticipated d/c in 48 hours - so relayed likely plan is not for d/c tomorrow. Son reports that patient has not been eating for two days and has not been able to walk. Relays concern about her returning home with spouse, as they're both elderly. PT/OT not ordered for patient, so relayed to son that same will be requested from provider in AM secondary to his concern about her mobility. Son aware that provider who was seeing patient today is gone for the day, but team will likely follow-up tomorrow and encouraged him to discuss above with them. Will follow-up with MD in AM re: PT/OT request and son's concern about patient's intake. Anticipate at least home care services being recommended. LifeVest delivered this morning - briefly reviewed reasoning for same. Will continue to follow for d/c planning needs.    Would you like to participate in our [facility] service program?  : No - Declined

## 2024-05-31 NOTE — PROGRESS NOTES
"[facility]  Progress Note  Name: Noble Chou I  MRN: [id number]  Unit/Bed#: S -Daniela I Date of Admission: 5/28/2024   Date of Service: 5/31/2024 I Hospital Day: 3    Assessment & Plan   Nonischemic cardiomyopathy (HCC)  Assessment & Plan  Life vest at bedside. [facility] on this admit. Showed NICM. Started on GDMT, cards following.       NSVT (nonsustained ventricular tachycardia) (HCC)  Assessment & Plan  Cont metoprolol    Primary hypertension  Assessment & Plan  Cont metoprolol, entresto.     Abnormal CT scan  Assessment & Plan  Ct abd/pelvis showed left renal lesion which will need to be f.up as outpt.   It also showed a 2.4 x 2.1 cm soft tissue density which is partially surrounded by a rim of fluid around uterus, will order pelvic ultrasound to further evaluate.     * Pneumonia  Assessment & Plan  Currently on ceftriaxone day 4/7. Reports improvement in sx, but continues to have cough and dyspnea with minimal exertion.   Cont abx.   WBC continues to uptrend, however, no fevers and normal procal. Will complete 7 day course of abx.   Appreciate respiratory vest therapy.   Cont tessalon perles with mucinex.                  Subjective/Objective     Subjective:   Reports productive cough, LINARES, no fevers or chills. Wants to go home because bed is not comfortable here.     Objective:  Vitals: Blood pressure 120/60, pulse 64, temperature 97.8 °F (36.6 °C), temperature source Oral, resp. rate 18, height 5' 1.75\" (1.568 m), weight 59 kg (130 lb 1.1 oz), SpO2 94%.,Body mass index is 23.98 kg/m².      Intake/Output Summary (Last 24 hours) at 5/31/2024 1238  Last data filed at 5/31/2024 0900  Gross per 24 hour   Intake 1805 ml   Output 1550 ml   Net 255 ml       Invasive Devices       Peripheral Intravenous Line  Duration             Peripheral IV 05/31/24 Distal;Left;Upper;Ventral (anterior) Arm <1 day                    Physical Exam:     Exams euvolemic.  Diminshed breath sounds at l and R lung " base, coarse breath sounds mid and upper lobes.       Lab, Imaging and other studies: I have personally reviewed pertinent reports.    VTE Pharmacologic Prophylaxis: Enoxaparin (Lovenox)  VTE Mechanical Prophylaxis: sequential compression device    Patient Centered Rounds: I have performed bedside rounds with nursing staff today.    Discussions with Specialists or Other Care Team Provider:     Education and Discussions with Family / Patient:     Time Spent for Care: 30 minutes.  More than 50% of total time spent on counseling and coordination of care as described above.    Current Length of Stay: 3 day(s)    Current Patient Status: Inpatient   Certification Statement: The patient will continue to require additional inpatient hospital stay due to LINARES and management of PNA    Discharge Plan / Estimated Discharge Date:       Clary Galeano MD

## 2024-05-31 NOTE — PHYSICAL THERAPY NOTE
PHYSICAL THERAPY EVALUATION/TREATMENT    NAME:  Noble Chou  AGE:   78 y.o.  Mrn:   [id number]  Length Of Stay: 3    ADMIT DX:  nstemi    Past Medical History:   Diagnosis Date    Edmondson esophagus     Bundle-branch block     Cervical spine fracture (HCC)     Colon polyp     GERD (gastroesophageal reflux disease)     Hypertension     Post-nasal drip     causing cough    Pure hypercholesterolemia 07/01/2019     Past Surgical History:   Procedure Laterality Date    CARDIAC CATHETERIZATION N/A 5/29/2024    Procedure: Cardiac catheterization;  Surgeon: Nilesh Mullen DO;  Location: [facility]ARD[facility];  Service: Cardiology    CARDIAC CATHETERIZATION N/A 5/29/2024    Procedure: Cardiac Coronary Angiogram;  Surgeon: Nilesh Mullen DO;  Location: [facility];  Service: Cardiology    COLONOSCOPY  09/08/2017    5 yr recall    JOINT REPLACEMENT Right     hip    TIBIAL IM QUIRINO REMOVAL      TOTAL HIP ARTHROPLASTY      UPPER GASTROINTESTINAL ENDOSCOPY  10/18/2018    3 yr recall      05/31/24 1130   PT Last Visit   PT Visit Date 05/31/24   Note Type   Note type Evaluation   Pain Assessment   Pain Assessment Tool 0-10   Pain Score No Pain   Restrictions/Precautions   Other Precautions Fall Risk;Bed Alarm;Chair Alarm;Telemetry  (K[place]i on room air)   Home Living   Type of Home House   Home Layout Two level;1/2 bath on main level;Bed/bath upstairs;Stairs to enter without rails  (To enter from garage)   Bathroom Shower/Tub Walk-in shower  (And a tub/shower)   Bathroom Toilet Raised   Bathroom Equipment Grab bars in shower;Toilet raiser   Bathroom Accessibility Accessible   Home Equipment Cane  (RW;Life Vest (new))   Additional Comments Was attending outpatient PT at a maintenance level 2-3 times per week for water therapy   Prior Function   Level of [place] Independent with ADLs;Independent with functional mobility;Independent with IADLS   Lives With Spouse   Receives Help From Family   IADLs  Independent with driving;Independent with meal prep;Independent with medication management   Falls in the last 6 months 0  (Denies)   Comments Patient ambulates without an assistive device prior to admission   General   Additional Pertinent History Patient is admitted from [facility] where she was initially seen for cough and abdominal pain.  Patient [facility] to [facility] for cardiac cath.   Family/Caregiver Present Yes  (Patient's spouse)   Cognition   Overall Cognitive Status WFL   Arousal/Participation Cooperative   Orientation Level Oriented to time   Memory Within functional limits   Following Commands Follows multistep commands with increased time or repetition   Comments At least 2 patient identifiers clearly name and date of birth   Subjective   Subjective Patient complains of cough and difficulty breathing   RLE Assessment   RLE Assessment WFL  (Grossly 3-3+/5)   LLE Assessment   LLE Assessment WFL  (Grossly 3-3+/5)   Light Touch   RLE Light Touch Grossly intact   LLE Light Touch Grossly intact   Proprioception   RLE Proprioception Grossly intact   LLE Proprioception Grossly Intact   Bed Mobility   Supine to Sit 6  Modified independent   Additional items Increased time required   Additional Comments After moving to edge of bed patient needs increased recovery time due to shortness of breath   Transfers   Sit to Stand 5  Supervision   Additional items Assist x 1;Verbal cues;Increased time required   Stand to Sit 5  Supervision   Additional items Assist x 1;Verbal cues;Increased time required   Ambulation/Elevation   Gait pattern Foward flexed;Decreased foot clearance;Short stride;Step through pattern;Decreased heel strike;Anatlgic   Gait Assistance   (CGA)   Additional items Assist x 1;Verbal cues;Tactile cues   Assistive Device None   Distance Direction and multiple turns in patient's room; patient mod short of breath at end of short distance ambulation, SaO2 on room air remains above 90%; patient needing increased  recovery time   Balance   Static Sitting Good   Static Standing Fair   Ambulatory   (Fair/F-)   Endurance Deficit   Endurance Deficit Yes   Activity Tolerance   Activity Tolerance Patient limited by fatigue;Treatment limited secondary to medical complications (Comment)  (Shortness of breath)   Assessment   Problem List Decreased strength;Decreased endurance;Impaired balance;Decreased mobility;Decreased safety awareness   Assessment Patient seen for Physical Therapy evaluation. Patient admitted with Pneumonia.  Comorbidities affecting patient's physical performance include: sepsis, BBB, NSVT, nonischemic cardiomyopathy, R ASHLYN, compression deformity of vertebra.  Personal factors affecting patient at time of initial evaluation include: lives in two story house, stairs to enter home, inability to navigate community distances, inability to navigate level surfaces without external assistance, and inability to perform dynamic tasks in community. Prior to admission, patient was independent with functional mobility without assistive device, independent with ADLS, independent with IADLS, living with spouse in a two level home with 2 steps to enter, ambulating household distance, and ambulating community distances.  Please find objective findings from Physical Therapy assessment regarding body systems outlined above with impairments and limitations including weakness, impaired balance, decreased endurance, impaired coordination, gait deviations, decreased activity tolerance, decreased functional mobility tolerance, decreased safety awareness, fall risk, and SOB upon exertion.  The Barthel Index was used as a functional outcome tool presenting with a score of Barthel Index Score: 55 today indicating marked limitations of functional mobility and ADLS.  Patient's clinical presentation is currently unstable/unpredictable as seen in patient's presentation of vital sign response, changing level of pain, increased fall risk, new  onset of impairment of functional mobility, decreased endurance, and new onset of weakness. Pt would benefit from continued Physical Therapy treatment to address deficits as defined above and maximize level of functional mobility. As demonstrated by objective findings, the assigned level of complexity for this evaluation is high.    The patient's -[facility] Basic Mobility Inpatient Short Form Raw Score is 18. A Raw score of greater than 16 suggests the patient may benefit from discharge to home. Please also refer to the recommendation of the Physical Therapist for safe discharge planning.   Goals   Patient Goals To be able to go home   STG Expiration Date 06/11/24   Short Term Goal #1 Patient will: Increase bilateral LE strength 1 grade to facilitate independent mobility, Perform all bed mobility tasks independently to improve pt's independence w/ repositioning for decrease risk of skin breakdown, Perform all transfers independently consistently from various height surfaces in order to improve I w/ engagement w/ real-world environments/situations, Ambulate at least 100 ft. with least restrictive assistive device independently w/o LOB to facilitate return and engagement w/ previous living environment, Navigate 2 stairs independently without handrail to either improve independence w/ entering home and/or so patient can fully access living areas in home, Increase ambulatory and static and dynamic standing balance 1 grade to decrease risk for falls, Tolerate at least 15 consecutive minutes of activity to demonstrate improved activity tolerance and endurance, and Tolerate 3 hr OOB to faciliate upright tolerance   Plan   Treatment/Interventions Functional transfer training;ADL retraining;LE strengthening/ROM;Elevations;Therapeutic exercise;Endurance training;Patient/family training;Equipment eval/education;Bed mobility;Gait training;Compensatory technique education;Spoke to case management;Family   PT Frequency 3-5x/wk    Discharge Recommendation   Rehab Resource Intensity Level, PT III (Minimum Resource Intensity)   AM-PAC Basic Mobility Inpatient   Turning in Flat Bed Without Bedrails 3   Lying on Back to Sitting on Edge of Flat Bed Without Bedrails 3   Moving Bed to Chair 3   Standing Up From Chair Using Arms 3   Walk in Room 3   Climb 3-5 Stairs With Railing 3   Basic Mobility Inpatient Raw Score 18   Basic Mobility Standardized Score 41.05   [facility] Highest Level Of Mobility   -HLM Goal 6: Walk 10 steps or more   -HLM Achieved 6: Walk 10 steps or more   Barthel Index   Feeding 10   Bathing 0   Grooming Score 0   Dressing Score 5   Bladder Score 10   Bowels Score 10   Toilet Use Score 5   Transfers (Bed/Chair) Score 10   Mobility (Level Surface) Score 0   Stairs Score 5   Barthel Index Score 55   Additional Treatment Session   Start Time 1130   End Time 1145   Treatment Assessment Patient agreeable to additional ambulation with trial of roller walker.  Can transfer sit to stand with supervision and cues for safe hand placement and ambulates with a roller walker 25 feet with change in direction with supervision.  Patient transfers stand to sit with supervision.  Patient moderately short of breath at end of ambulation with roller walker needed increased recovery time. SAO2 remained above 90% on room air. A: patient with limited tolerance to PT evaluation and treatment due to cough and moderate SOB with limited activity.  While admitted, patient will continue to benefit from skilled physical therapy services to increase her strength, balance, endurance, safe gait and functional mobility with and without the roller walker.  Patient encouraged to continue to use the roller walker at this time to allow for increased support and endurance.  When medically stable for discharge, patient is appropriate for Level III Minimum Resource Intensity.   End of Consult   Patient Position at End of Consult Bed/Chair alarm  activated;Bedside chair;All needs within reach   Licensure   [place] License Number  Francisca L LORI Read   Portions of the documentation may have been created using voice recognition software. Occasional wrong word or sound alike substitutions may have occurred due to the inherent limitation of the voice recognition software. Read the chart carefully and recognize, using context, where substitutions have occurred.

## 2024-06-01 ENCOUNTER — APPOINTMENT (OUTPATIENT)
Dept: ULTRASOUND IMAGING | Facility: HOSPITAL | Age: 79
DRG: 286 | End: 2024-06-01
Payer: COMMERCIAL

## 2024-06-01 LAB
ANION GAP SERPL CALCULATED.3IONS-SCNC: 10 MMOL/L (ref 4–13)
BACTERIA BLD CULT: NORMAL
BACTERIA BLD CULT: NORMAL
BASOPHILS # BLD AUTO: 0.03 THOUSANDS/ÂΜL (ref 0–0.1)
BASOPHILS NFR BLD AUTO: 0 % (ref 0–1)
BUN SERPL-MCNC: 24 MG/DL (ref 5–25)
CALCIUM SERPL-MCNC: 7.9 MG/DL (ref 8.4–10.2)
CHLORIDE SERPL-SCNC: 106 MMOL/L (ref 96–108)
CO2 SERPL-SCNC: 18 MMOL/L (ref 21–32)
CREAT SERPL-MCNC: 0.72 MG/DL (ref 0.6–1.3)
EOSINOPHIL # BLD AUTO: 0.1 THOUSAND/ÂΜL (ref 0–0.61)
EOSINOPHIL NFR BLD AUTO: 1 % (ref 0–6)
ERYTHROCYTE [DISTWIDTH] IN BLOOD BY AUTOMATED COUNT: 14.6 % (ref 11.6–15.1)
GFR SERPL CREATININE-BSD FRML MDRD: 79 ML/MIN/1.73SQ M
GLUCOSE SERPL-MCNC: 102 MG/DL (ref 65–140)
HCT VFR BLD AUTO: 34.9 % (ref 34.8–46.1)
HGB BLD-MCNC: 11.3 G/DL (ref 11.5–15.4)
IMM GRANULOCYTES # BLD AUTO: 0.15 THOUSAND/UL (ref 0–0.2)
IMM GRANULOCYTES NFR BLD AUTO: 1 % (ref 0–2)
LYMPHOCYTES # BLD AUTO: 2.67 THOUSANDS/ÂΜL (ref 0.6–4.47)
LYMPHOCYTES NFR BLD AUTO: 15 % (ref 14–44)
MCH RBC QN AUTO: 27.7 PG (ref 26.8–34.3)
MCHC RBC AUTO-ENTMCNC: 32.4 G/DL (ref 31.4–37.4)
MCV RBC AUTO: 86 FL (ref 82–98)
MONOCYTES # BLD AUTO: 1.25 THOUSAND/ÂΜL (ref 0.17–1.22)
MONOCYTES NFR BLD AUTO: 7 % (ref 4–12)
NEUTROPHILS # BLD AUTO: 13.1 THOUSANDS/ÂΜL (ref 1.85–7.62)
NEUTS SEG NFR BLD AUTO: 76 % (ref 43–75)
NRBC BLD AUTO-RTO: 0 /100 WBCS
PLATELET # BLD AUTO: 463 THOUSANDS/UL (ref 149–390)
PMV BLD AUTO: 9.3 FL (ref 8.9–12.7)
POTASSIUM SERPL-SCNC: 3.4 MMOL/L (ref 3.5–5.3)
RBC # BLD AUTO: 4.08 MILLION/UL (ref 3.81–5.12)
SODIUM SERPL-SCNC: 134 MMOL/L (ref 135–147)
WBC # BLD AUTO: 17.3 THOUSAND/UL (ref 4.31–10.16)

## 2024-06-01 PROCEDURE — 99232 SBSQ HOSP IP/OBS MODERATE 35: CPT | Performed by: STUDENT IN AN ORGANIZED HEALTH CARE EDUCATION/TRAINING PROGRAM

## 2024-06-01 PROCEDURE — 76830 TRANSVAGINAL US NON-OB: CPT

## 2024-06-01 PROCEDURE — 85025 COMPLETE CBC W/AUTO DIFF WBC: CPT | Performed by: HOSPITALIST

## 2024-06-01 PROCEDURE — 99232 SBSQ HOSP IP/OBS MODERATE 35: CPT | Performed by: INTERNAL MEDICINE

## 2024-06-01 PROCEDURE — 80048 BASIC METABOLIC PNL TOTAL CA: CPT | Performed by: HOSPITALIST

## 2024-06-01 PROCEDURE — 76856 US EXAM PELVIC COMPLETE: CPT

## 2024-06-01 RX ORDER — DOXYCYCLINE 100 MG/10ML
100 INJECTION, POWDER, LYOPHILIZED, FOR SOLUTION INTRAVENOUS
Status: DISCONTINUED | OUTPATIENT
Start: 2024-06-01 | End: 2024-06-01

## 2024-06-01 RX ORDER — POTASSIUM CHLORIDE 20 MEQ/1
40 TABLET, EXTENDED RELEASE ORAL ONCE
Status: COMPLETED | OUTPATIENT
Start: 2024-06-01 | End: 2024-06-01

## 2024-06-01 RX ADMIN — Medication 30 MG: at 09:49

## 2024-06-01 RX ADMIN — AMIODARONE HYDROCHLORIDE 200 MG: 200 TABLET ORAL at 17:33

## 2024-06-01 RX ADMIN — GUAIFENESIN 1200 MG: 600 TABLET ORAL at 09:47

## 2024-06-01 RX ADMIN — LIDOCAINE 5% 2 PATCH: 700 PATCH TOPICAL at 06:19

## 2024-06-01 RX ADMIN — BENZONATATE 100 MG: 100 CAPSULE ORAL at 17:33

## 2024-06-01 RX ADMIN — ASPIRIN 81 MG CHEWABLE TABLET 81 MG: 81 TABLET CHEWABLE at 09:47

## 2024-06-01 RX ADMIN — SACUBITRIL AND VALSARTAN 1 TABLET: 24; 26 TABLET, FILM COATED ORAL at 17:33

## 2024-06-01 RX ADMIN — Medication 30 MG: at 21:20

## 2024-06-01 RX ADMIN — CEFTRIAXONE SODIUM 1000 MG: 10 INJECTION, POWDER, FOR SOLUTION INTRAVENOUS at 06:19

## 2024-06-01 RX ADMIN — AMIODARONE HYDROCHLORIDE 200 MG: 200 TABLET ORAL at 13:16

## 2024-06-01 RX ADMIN — METOPROLOL SUCCINATE 50 MG: 50 TABLET, EXTENDED RELEASE ORAL at 21:20

## 2024-06-01 RX ADMIN — ENOXAPARIN SODIUM 40 MG: 40 INJECTION SUBCUTANEOUS at 13:16

## 2024-06-01 RX ADMIN — BENZONATATE 100 MG: 100 CAPSULE ORAL at 21:20

## 2024-06-01 RX ADMIN — POTASSIUM CHLORIDE 40 MEQ: 1500 TABLET, EXTENDED RELEASE ORAL at 09:47

## 2024-06-01 RX ADMIN — METOPROLOL SUCCINATE 50 MG: 50 TABLET, EXTENDED RELEASE ORAL at 09:47

## 2024-06-01 RX ADMIN — ATORVASTATIN CALCIUM 80 MG: 40 TABLET, FILM COATED ORAL at 17:33

## 2024-06-01 RX ADMIN — SACUBITRIL AND VALSARTAN 1 TABLET: 24; 26 TABLET, FILM COATED ORAL at 09:47

## 2024-06-01 RX ADMIN — PANTOPRAZOLE SODIUM 40 MG: 40 TABLET, DELAYED RELEASE ORAL at 06:19

## 2024-06-01 RX ADMIN — GUAIFENESIN 1200 MG: 600 TABLET ORAL at 21:20

## 2024-06-01 RX ADMIN — AMIODARONE HYDROCHLORIDE 200 MG: 200 TABLET ORAL at 09:47

## 2024-06-01 RX ADMIN — BENZONATATE 100 MG: 100 CAPSULE ORAL at 09:47

## 2024-06-01 RX ADMIN — Medication 30 MG: at 17:33

## 2024-06-01 NOTE — ASSESSMENT & PLAN NOTE
Originally presented to [facility] with abdominal pain complaints after completing [place] for pneumonia.  Found to have up-trending troponins so was transferred to [place] for cardiac catheterization.  Cardiology consulted and appreciate their recommendations.  EKG: LBBB, PACs  Loaded with Brilenta, ASA and started on ACS heparin originally.  Increase statin from pravastatin to lipitor 80 mg  Started metoprolol tartrate with dose escalation to 50 mg every 8 hours secondary to NSVT.  LHC (5/29/24) without obstructive CAD  ECHO (5/28/24): EF 30% with grade 1 diastolic dysfunction.

## 2024-06-01 NOTE — ASSESSMENT & PLAN NOTE
Presented to ED with abdominal pain, cough.  CT: RLL pneumonia. Small right pleural effusion. Patchy left sided opacities with endoluminal debris.  Consideration for aspiration.   Antibiotics   Completed Z vale as outpatient  Cefepime, vancomycin in ED. Transition to ceftriaxone and doxycycline.  Doxycycline was discontinued with normal urinary antigens.  Patient remains on ceftriaxone however WBCs are up trending.  Tessalon TID, mucinex BID  Respiratory protocol placed - patient declined breathing treatments  Pulmonary consulted and appreciate their recommendations.  Patient may require bronchoscopy based upon findings of debris in the airways.    Appreciate pulmonology recommendations, continue ceftriaxone at this time, respiratory protocol

## 2024-06-01 NOTE — PLAN OF CARE
Problem: PAIN - ADULT  Goal: Verbalizes/displays adequate comfort level or baseline comfort level  Description: Interventions:  - Encourage patient to monitor pain and request assistance  - Assess pain using appropriate pain scale  - Administer analgesics based on type and severity of pain and evaluate response  - Implement non-pharmacological measures as appropriate and evaluate response  - Consider cultural and social influences on pain and pain management  - Notify physician/advanced practitioner if interventions unsuccessful or patient reports new pain  Outcome: Progressing     Problem: INFECTION - ADULT  Goal: Absence or prevention of progression during hospitalization  Description: INTERVENTIONS:  - Assess and monitor for signs and symptoms of infection  - Monitor lab/diagnostic results  - Monitor all insertion sites, i.e. indwelling lines, tubes, and drains  - Monitor endotracheal if appropriate and nasal secretions for changes in amount and color  - [place] appropriate cooling/warming therapies per order  - Administer medications as ordered  - Instruct and encourage patient and family to use good hand hygiene technique  - Identify and instruct in appropriate isolation precautions for identified infection/condition  Outcome: Progressing  Goal: Absence of fever/infection during neutropenic period  Description: INTERVENTIONS:  - Monitor WBC    Outcome: Progressing     Problem: SAFETY ADULT  Goal: Patient will remain free of falls  Description: INTERVENTIONS:  - Educate patient/family on patient safety including physical limitations  - Instruct patient to call for assistance with activity   - Consult OT/PT to assist with strengthening/mobility   - Keep Call bell within reach  - Keep bed low and locked with side rails adjusted as appropriate  - Keep care items and personal belongings within reach  - Initiate and maintain comfort rounds  - Make Fall Risk Sign visible to staff  - Offer Toileting every  Hours,  in advance of need  - Initiate/Maintain alarm  - Obtain necessary fall risk management equipment:   - Apply yellow socks and bracelet for high fall risk patients  - Consider moving patient to room near nurses station  Outcome: Progressing  Goal: Maintain or return to baseline ADL function  Description: INTERVENTIONS:  -  Assess patient's ability to carry out ADLs; assess patient's baseline for ADL function and identify physical deficits which impact ability to perform ADLs (bathing, care of mouth/teeth, toileting, grooming, dressing, etc.)  - Assess/evaluate cause of self-care deficits   - Assess range of motion  - Assess patient's mobility; develop plan if impaired  - Assess patient's need for assistive devices and provide as appropriate  - Encourage maximum independence but intervene and supervise when necessary  - Involve family in performance of ADLs  - Assess for home care needs following discharge   - Consider OT consult to assist with ADL evaluation and planning for discharge  - Provide patient education as appropriate  Outcome: Progressing  Goal: Maintains/Returns to pre admission functional level  Description: INTERVENTIONS:  - Perform AM-PAC 6 Click Basic Mobility/ Daily Activity assessment daily.  - Set and communicate daily mobility goal to care team and patient/family/caregiver.   - Collaborate with rehabilitation services on mobility goals if consulted  - Perform Range of Motion  times a day.  - Reposition patient every  hours.  - Dangle patient  times a day  - Stand patient  times a day  - Ambulate patient  times a day  - Out of bed to chair  times a day   - Out of bed for meals  times a day  - Out of bed for toileting  - Record patient progress and toleration of activity level   Outcome: Progressing     Problem: DISCHARGE PLANNING  Goal: Discharge to home or other facility with appropriate resources  Description: INTERVENTIONS:  - Identify barriers to discharge w/patient and caregiver  - Arrange for  needed discharge resources and transportation as appropriate  - Identify discharge learning needs (meds, wound care, etc.)  - Arrange for interpretive services to assist at discharge as needed  - Refer to Case Management Department for coordinating discharge planning if the patient needs post-hospital services based on physician/advanced practitioner order or complex needs related to functional status, cognitive ability, or social support system  Outcome: Progressing

## 2024-06-01 NOTE — ASSESSMENT & PLAN NOTE
Tachycardia, leukocytosis (recently completed course of prednisone on the weekend).  Lactate normal.  Procalcitonin normal.   Source: RLL pneumonia (suspect possible viral)  Antibiotics: completed z vale as outpatient.  Cefepime, vancomycin in ED. transitioned to ceftriaxone.  Concern for aspiration given patient's history of Edmondson's esophagus and speech evaluation.  WBCs are uptrending despite IV ceftriaxone.  Awaiting pulmonary evaluation.  Likely will need to escalate antibiotics.  Urine antigens negative  Blood cultures: Negative to date

## 2024-06-01 NOTE — PROGRESS NOTES
INTERNAL MEDICINE PROGRESS NOTE     Name: Noble Chou   Age & Sex: 78 y.o. female   MRN: [id number]  Unit/Bed#: S -01   Encounter: [id number]  Hospital Stay Days: 4      ASSESSMENT/PLAN     Principal Problem:    Pneumonia  Active Problems:    Edmondson's esophagus without dysplasia    Pure hypercholesterolemia    Elevated troponin level not due myocardial infarction    Sepsis (HCC)    Abnormal CT scan    Compression deformity of vertebra    Primary hypertension    NSVT (nonsustained ventricular tachycardia) (HCC)    Nonischemic cardiomyopathy (HCC)      Nonischemic cardiomyopathy (HCC)  Assessment & Plan  Patient noted with severely reduced EF on echocardiogram at 30%.  Patient has been fitted with a LifeVest.  Follow-up with cardiology as an outpatient.    NSVT (nonsustained ventricular tachycardia) (HCC)  Assessment & Plan  Patient noted to have multiple short runs of NSVT.  Cardiology has uptitrated her metoprolol dose.  She is maintained on amiodarone infusion.  Appreciate cardiology's recommendations to transition to p.o. regimen.    Primary hypertension  Assessment & Plan  Had been on fosinopril as an out-patient, which was held for cardiac catheterization.    Patient was also started on metoprolol secondary to NSVT.  Blood pressures have remained stable and acceptable.    Compression deformity of vertebra  Assessment & Plan  Age indetermine L3 compression deformity   Has occasional mild low back pain, none currently.  Patient with low calcium levels and likely has underlying osteopenia or osteoporosis and may benefit from an out-patient DEXA scan.    Abnormal CT scan  Assessment & Plan  Indeterminate 1.3 left renal lesion.  Cannot distinguish hyperdenst cyst from solid neoplasm on single phase CT.    Nonemergent renal protocol or MRI  Abnormal appearance of uterus or vaginal cuff.  Soft tissue density surrounded by rim of fluid is indeterminate but presence of neoplasm cannot be excluded  Non emergent  pelvic US  Consider imaging as inpatient vs outpatient.  Patient is aware of above findings    Sepsis (HCC)  Assessment & Plan  Tachycardia, leukocytosis (recently completed course of prednisone on the weekend).  Lactate normal.  Procalcitonin normal.   Source: RLL pneumonia (suspect possible viral)  Antibiotics: completed z vale as outpatient.  Cefepime, vancomycin in ED. transitioned to ceftriaxone.  Concern for aspiration given patient's history of Edmondson's esophagus and speech evaluation.  WBCs are uptrending despite IV ceftriaxone.  Awaiting pulmonary evaluation.  Likely will need to escalate antibiotics.  Urine antigens negative  Blood cultures: Negative to date    Elevated troponin level not due myocardial infarction  Assessment & Plan  Originally presented to [facility] with abdominal pain complaints after completing [place] for pneumonia.  Found to have up-trending troponins so was transferred to [place] for cardiac catheterization.  Cardiology consulted and appreciate their recommendations.  EKG: LBBB, PACs  Loaded with Brilenta, ASA and started on ACS heparin originally.  Increase statin from pravastatin to lipitor 80 mg  Started metoprolol tartrate with dose escalation to 50 mg every 8 hours secondary to NSVT.  LHC (5/29/24) without obstructive CAD  ECHO (5/28/24): EF 30% with grade 1 diastolic dysfunction.    Pure hypercholesterolemia  Assessment & Plan  Maintained on pravastatin as outpatient  Increase to atorvastatin 80 mg  Lipid panel ordered  HDL mildly low, rest of lipid panel at goal    Edmondson's esophagus without dysplasia  Assessment & Plan  Patient with a known history of Edmondson's esophagus.  She follows with GI as an outpatient.    * Pneumonia  Assessment & Plan  Presented to ED with abdominal pain, cough.  CT: RLL pneumonia. Small right pleural effusion. Patchy left sided opacities with endoluminal debris.  Consideration for aspiration.   Antibiotics   Completed Z vale as  outpatient  Cefepime, vancomycin in ED. Transition to ceftriaxone and doxycycline.  Doxycycline was discontinued with normal urinary antigens.  Patient remains on ceftriaxone however WBCs are up trending.  Tessalon TID, mucinex BID  Respiratory protocol placed - patient declined breathing treatments  Pulmonary consulted and appreciate their recommendations.  Patient may require bronchoscopy based upon findings of debris in the airways.    Appreciate pulmonology recommendations, continue ceftriaxone at this time, respiratory protocol        VTE Pharmacologic Prophylaxis:   Pharmacologic: Enoxaparin (Lovenox)  Mechanical VTE Prophylaxis in Place: Yes    Patient Centered Rounds: I have performed bedside rounds with nursing staff today.    Discussions with Specialists or Other Care Team Provider: Appreciate cardiology and pulmonology recommendations    Education and Discussions with Family / Patient: Discussed with patient at bedside this morning    Time Spent for Care: 45 minutes.  More than 50% of total time spent on counseling and coordination of care as described above.    Current Length of Stay: 4 day(s)    Current Patient Status: Inpatient   Certification Statement: The patient will continue to require additional inpatient hospital stay due to pneumonia treatment with IV antibiotic therapy    Discharge Plan / Estimated Discharge Date: 24-48 hrs.    Code Status: Level 1 - Full Code    Subjective:     Patient seen and examined at bedside this morning, patient notes that cough is improving, patient will continue IV antibiotic therapy, slight increase in white blood cell count discussed with patient today.  Patient will be following up with cardiology and pulmonology.    Objective:   Vitals:   Temp (24hrs), Av °F (36.7 °C), Min:98 °F (36.7 °C), Max:98 °F (36.7 °C)    Temp:  [98 °F (36.7 °C)] 98 °F (36.7 °C)  HR:  [69] 69  BP: (117-118)/(53-56) 118/53  SpO2:  [95 %] 95 %  Body mass index is 23.33 kg/m².     Input  and Output Summary (last 24 hours):     Intake/Output Summary (Last 24 hours) at 6/1/2024 1002  Last data filed at 5/31/2024 1700  Gross per 24 hour   Intake 360 ml   Output --   Net 360 ml     Physical Exam:   Physical Exam  Constitutional:       General: She is not in acute distress.  HENT:      Head: Normocephalic and atraumatic.   Eyes:      General: No scleral icterus.     Conjunctiva/sclera: Conjunctivae normal.   Cardiovascular:      Rate and Rhythm: Normal rate and regular rhythm.      Pulses: Normal pulses.      Heart sounds: No murmur heard.  Pulmonary:      Effort: Pulmonary effort is normal. No respiratory distress.      Breath sounds: Rhonchi present. No rales.   Abdominal:      General: Bowel sounds are normal. There is no distension.      Palpations: Abdomen is soft.      Tenderness: There is no abdominal tenderness.   Musculoskeletal:         General: No swelling. Normal range of motion.   Skin:     General: Skin is warm and dry.      Capillary Refill: Capillary refill takes less than 2 seconds.      Coloration: Skin is not jaundiced.   Neurological:      General: No focal deficit present.      Mental Status: She is alert and oriented to person, place, and time. Mental status is at baseline.   Psychiatric:         Mood and Affect: Mood normal.         Behavior: Behavior normal.         Additional Data:   Basic Laboratory Review:   Results from last 7 days   Lab Units 06/01/24  0628 05/31/24  0451 05/30/24  0542 05/28/24  0824 05/27/24  1642   SODIUM mmol/L 134* 135 134*   < > 133*   POTASSIUM mmol/L 3.4* 3.7 4.0   < > 3.7   CHLORIDE mmol/L 106 107 107   < > 100   CO2 mmol/L 18* 19* 17*   < > 23   BUN mg/dL 24 22 22   < > 16   CREATININE mg/dL 0.72 0.71 0.77   < > 0.75   GLUCOSE RANDOM mg/dL 102 126 135   < > 114   CALCIUM mg/dL 7.9* 7.9* 7.6*   < > 8.8   ALBUMIN g/dL  --   --   --   --  3.8   ALK PHOS U/L  --   --   --   --  108*   ALT U/L  --   --   --   --  23   AST U/L  --   --   --   --  24    EGFR ml/min/1.73sq m 79 80 73   < > 75    < > = values in this interval not displayed.       Results from last 7 days   Lab Units 06/01/24  0628 05/31/24  0451 05/30/24  0542   WBC Thousand/uL 17.30* 15.56* 14.73*   HEMOGLOBIN g/dL 11.3* 11.4* 11.3*   HEMATOCRIT % 34.9 34.1* 34.5*   PLATELETS Thousands/uL 463* 420* 465*   SEGS PCT % 76* 82* 83*   LYMPHO PCT % 15 10* 9*   MONO PCT % 7 7 7   EOS PCT % 1 0 0   BASOS PCT % 0 0 0   TOTAL NEUT ABS Thousands/µL 13.10* 12.78* 12.33*   LYMPHS ABS Thousands/µL 2.67 1.57 1.31   MONOS ABS Thousand/µL 1.25* 1.04 0.98   EOS ABS Thousand/µL 0.10 0.00 0.00   BASOS ABS Thousands/µL 0.03 0.02 0.02     Results from last 7 days   Lab Units 05/29/24  0549 05/28/24  2239 05/28/24  1542 05/28/24  0022 05/27/24  1642   PROTIME seconds  --   --   --   --  16.5*   INR   --   --   --   --  1.29*   PTT seconds 76* 70* 66*   < > 29    < > = values in this interval not displayed.     Results from last 7 days   Lab Units 05/27/24  1642   BNP pg/mL 124*       Additional Labs:  Results from last 7 days   Lab Units 05/28/24  0824 05/27/24  1642   LIPASE u/L  --  14   LACTIC ACID mmol/L  --  1.4   MAGNESIUM mg/dL 2.5  --           Results from last 7 days   Lab Units 05/30/24  0542 05/28/24  0824   TRIGLYCERIDES mg/dL  --  116   HDL mg/dL  --  49*   LDL CALC mg/dL  --  71   TSH 3RD GENERATON uIU/mL 1.828  --        Recent Cultures (last 7 days):   Results from last 7 days   Lab Units 05/28/24  0637 05/27/24  1642   BLOOD CULTURE   --  No Growth After 4 Days.  No Growth After 4 Days.   LEGIONELLA URINARY ANTIGEN  Negative  --        * I Have Reviewed All Lab Data Listed Above.  * Additional Pertinent Lab Tests Reviewed: All Labs Within Last 24 Hours Reviewed    Imaging:  Prior imaging studies and reports reviewed    Last 24 Hours Medication List:   Current Facility-Administered Medications   Medication Dose Route Frequency Provider Last Rate    acetaminophen  650 mg Oral Q6H PRN Basia Bishop,  CRNP      albuterol  2.5 mg Nebulization Q6H PRN Basia Weicsek, CRNP      amiodarone  200 mg Oral TID With Meals Ye Ruiz MD      Followed by    [START ON 6/6/2024] amiodarone  200 mg Oral BID With Meals Ye Ruiz MD      Followed by    [START ON 6/14/2024] amiodarone  200 mg Oral Daily With Breakfast Ye Ruiz MD      aspirin  81 mg Oral Daily Basia Grecsek, KENDALLNP      atorvastatin  80 mg Oral Daily With Dinner Basia Weicsek, CRNP      benzonatate  100 mg Oral TID Basia Weicsek, CAROL      cefTRIAXone  1,000 mg Intravenous Q24H Basia Grecsek, CRNP 1,000 mg (06/01/24 0619)    co-enzyme Q-10  30 mg Oral TID Basia Grecsek, KENDALLNP      enoxaparin  40 mg Subcutaneous Q24H Amparo Quintana PA-C      guaiFENesin  1,200 mg Oral Q12H DEVON Basia Grecsek, KENDALLNP      lidocaine  2 patch Topical Daily Basia Danaek, CAROL      metoprolol succinate  50 mg Oral Q12H Ye Ruiz MD      pantoprazole  40 mg Oral Early Morning Basia Dario, CAROL      sacubitril-valsartan  1 tablet Oral BID Ye Ruiz MD       Today, Patient Was Seen By: Lonny Akins DO

## 2024-06-02 ENCOUNTER — APPOINTMENT (INPATIENT)
Dept: VASCULAR ULTRASOUND | Facility: HOSPITAL | Age: 79
DRG: 286 | End: 2024-06-02
Payer: COMMERCIAL

## 2024-06-02 LAB
ALBUMIN SERPL BCP-MCNC: 3 G/DL (ref 3.5–5)
ALP SERPL-CCNC: 72 U/L (ref 34–104)
ALT SERPL W P-5'-P-CCNC: 24 U/L (ref 7–52)
ANION GAP SERPL CALCULATED.3IONS-SCNC: 8 MMOL/L (ref 4–13)
AST SERPL W P-5'-P-CCNC: 22 U/L (ref 13–39)
BASOPHILS # BLD AUTO: 0.02 THOUSANDS/ÂΜL (ref 0–0.1)
BASOPHILS NFR BLD AUTO: 0 % (ref 0–1)
BILIRUB SERPL-MCNC: 0.31 MG/DL (ref 0.2–1)
BUN SERPL-MCNC: 25 MG/DL (ref 5–25)
CALCIUM ALBUM COR SERPL-MCNC: 8.8 MG/DL (ref 8.3–10.1)
CALCIUM SERPL-MCNC: 8 MG/DL (ref 8.4–10.2)
CHLORIDE SERPL-SCNC: 111 MMOL/L (ref 96–108)
CO2 SERPL-SCNC: 17 MMOL/L (ref 21–32)
CREAT SERPL-MCNC: 0.74 MG/DL (ref 0.6–1.3)
EOSINOPHIL # BLD AUTO: 0.24 THOUSAND/ÂΜL (ref 0–0.61)
EOSINOPHIL NFR BLD AUTO: 2 % (ref 0–6)
ERYTHROCYTE [DISTWIDTH] IN BLOOD BY AUTOMATED COUNT: 15.2 % (ref 11.6–15.1)
GFR SERPL CREATININE-BSD FRML MDRD: 76 ML/MIN/1.73SQ M
GLUCOSE SERPL-MCNC: 104 MG/DL (ref 65–140)
HCT VFR BLD AUTO: 33 % (ref 34.8–46.1)
HGB BLD-MCNC: 10.7 G/DL (ref 11.5–15.4)
IMM GRANULOCYTES # BLD AUTO: 0.14 THOUSAND/UL (ref 0–0.2)
IMM GRANULOCYTES NFR BLD AUTO: 1 % (ref 0–2)
LYMPHOCYTES # BLD AUTO: 2.33 THOUSANDS/ÂΜL (ref 0.6–4.47)
LYMPHOCYTES NFR BLD AUTO: 16 % (ref 14–44)
MCH RBC QN AUTO: 27.7 PG (ref 26.8–34.3)
MCHC RBC AUTO-ENTMCNC: 32.4 G/DL (ref 31.4–37.4)
MCV RBC AUTO: 86 FL (ref 82–98)
MONOCYTES # BLD AUTO: 1.1 THOUSAND/ÂΜL (ref 0.17–1.22)
MONOCYTES NFR BLD AUTO: 7 % (ref 4–12)
NEUTROPHILS # BLD AUTO: 11.14 THOUSANDS/ÂΜL (ref 1.85–7.62)
NEUTS SEG NFR BLD AUTO: 74 % (ref 43–75)
NRBC BLD AUTO-RTO: 0 /100 WBCS
PLATELET # BLD AUTO: 458 THOUSANDS/UL (ref 149–390)
PMV BLD AUTO: 9.2 FL (ref 8.9–12.7)
POTASSIUM SERPL-SCNC: 4.2 MMOL/L (ref 3.5–5.3)
PROT SERPL-MCNC: 5.9 G/DL (ref 6.4–8.4)
RBC # BLD AUTO: 3.86 MILLION/UL (ref 3.81–5.12)
SODIUM SERPL-SCNC: 136 MMOL/L (ref 135–147)
WBC # BLD AUTO: 14.97 THOUSAND/UL (ref 4.31–10.16)

## 2024-06-02 PROCEDURE — 93971 EXTREMITY STUDY: CPT

## 2024-06-02 PROCEDURE — 85025 COMPLETE CBC W/AUTO DIFF WBC: CPT | Performed by: STUDENT IN AN ORGANIZED HEALTH CARE EDUCATION/TRAINING PROGRAM

## 2024-06-02 PROCEDURE — 99232 SBSQ HOSP IP/OBS MODERATE 35: CPT | Performed by: FAMILY MEDICINE

## 2024-06-02 PROCEDURE — 80053 COMPREHEN METABOLIC PANEL: CPT | Performed by: STUDENT IN AN ORGANIZED HEALTH CARE EDUCATION/TRAINING PROGRAM

## 2024-06-02 PROCEDURE — 93971 EXTREMITY STUDY: CPT | Performed by: SURGERY

## 2024-06-02 RX ORDER — ASPIRIN 81 MG/1
81 TABLET, CHEWABLE ORAL DAILY
Qty: 30 TABLET | Refills: 0 | Status: SHIPPED | OUTPATIENT
Start: 2024-06-03

## 2024-06-02 RX ORDER — GUAIFENESIN/DEXTROMETHORPHAN 100-10MG/5
10 SYRUP ORAL EVERY 4 HOURS PRN
Status: DISCONTINUED | OUTPATIENT
Start: 2024-06-02 | End: 2024-06-03 | Stop reason: HOSPADM

## 2024-06-02 RX ORDER — DOXYCYCLINE HYCLATE 100 MG/1
100 CAPSULE ORAL EVERY 12 HOURS SCHEDULED
Status: DISCONTINUED | OUTPATIENT
Start: 2024-06-02 | End: 2024-06-03 | Stop reason: HOSPADM

## 2024-06-02 RX ORDER — AMIODARONE HYDROCHLORIDE 200 MG/1
TABLET ORAL
Qty: 40 TABLET | Refills: 0 | Status: SHIPPED | OUTPATIENT
Start: 2024-06-02 | End: 2024-06-27

## 2024-06-02 RX ORDER — METOPROLOL SUCCINATE 50 MG/1
50 TABLET, EXTENDED RELEASE ORAL EVERY 12 HOURS
Qty: 60 TABLET | Refills: 0 | Status: SHIPPED | OUTPATIENT
Start: 2024-06-02 | End: 2024-07-02

## 2024-06-02 RX ORDER — GUAIFENESIN/DEXTROMETHORPHAN 100-10MG/5
10 SYRUP ORAL EVERY 4 HOURS PRN
Qty: 237 ML | Refills: 0 | Status: SHIPPED | OUTPATIENT
Start: 2024-06-02

## 2024-06-02 RX ORDER — PANTOPRAZOLE SODIUM 40 MG/1
40 TABLET, DELAYED RELEASE ORAL
Qty: 30 TABLET | Refills: 0 | Status: SHIPPED | OUTPATIENT
Start: 2024-06-03

## 2024-06-02 RX ORDER — DOXYCYCLINE HYCLATE 100 MG/1
100 CAPSULE ORAL EVERY 12 HOURS SCHEDULED
Qty: 7 CAPSULE | Refills: 0 | Status: SHIPPED | OUTPATIENT
Start: 2024-06-02 | End: 2024-06-06

## 2024-06-02 RX ORDER — PRAVASTATIN SODIUM 40 MG
40 TABLET ORAL DAILY
Qty: 30 TABLET | Refills: 0 | Status: SHIPPED | OUTPATIENT
Start: 2024-06-02

## 2024-06-02 RX ADMIN — DOXYCYCLINE 100 MG: 100 CAPSULE ORAL at 21:05

## 2024-06-02 RX ADMIN — Medication 30 MG: at 08:59

## 2024-06-02 RX ADMIN — BENZONATATE 100 MG: 100 CAPSULE ORAL at 17:22

## 2024-06-02 RX ADMIN — METOPROLOL SUCCINATE 50 MG: 50 TABLET, EXTENDED RELEASE ORAL at 08:59

## 2024-06-02 RX ADMIN — Medication 30 MG: at 21:05

## 2024-06-02 RX ADMIN — METOPROLOL SUCCINATE 50 MG: 50 TABLET, EXTENDED RELEASE ORAL at 21:05

## 2024-06-02 RX ADMIN — LIDOCAINE 5% 2 PATCH: 700 PATCH TOPICAL at 04:58

## 2024-06-02 RX ADMIN — AMIODARONE HYDROCHLORIDE 200 MG: 200 TABLET ORAL at 08:59

## 2024-06-02 RX ADMIN — SACUBITRIL AND VALSARTAN 1 TABLET: 24; 26 TABLET, FILM COATED ORAL at 09:00

## 2024-06-02 RX ADMIN — AMIODARONE HYDROCHLORIDE 200 MG: 200 TABLET ORAL at 13:13

## 2024-06-02 RX ADMIN — ATORVASTATIN CALCIUM 80 MG: 40 TABLET, FILM COATED ORAL at 17:22

## 2024-06-02 RX ADMIN — GUAIFENESIN 1200 MG: 600 TABLET ORAL at 08:59

## 2024-06-02 RX ADMIN — BENZONATATE 100 MG: 100 CAPSULE ORAL at 08:59

## 2024-06-02 RX ADMIN — Medication 30 MG: at 17:22

## 2024-06-02 RX ADMIN — SACUBITRIL AND VALSARTAN 1 TABLET: 24; 26 TABLET, FILM COATED ORAL at 17:22

## 2024-06-02 RX ADMIN — DOXYCYCLINE 100 MG: 100 CAPSULE ORAL at 09:32

## 2024-06-02 RX ADMIN — ASPIRIN 81 MG CHEWABLE TABLET 81 MG: 81 TABLET CHEWABLE at 08:59

## 2024-06-02 RX ADMIN — CEFTRIAXONE SODIUM 1000 MG: 10 INJECTION, POWDER, FOR SOLUTION INTRAVENOUS at 04:58

## 2024-06-02 RX ADMIN — GUAIFENESIN AND DEXTROMETHORPHAN 10 ML: 100; 10 SYRUP ORAL at 01:59

## 2024-06-02 RX ADMIN — PANTOPRAZOLE SODIUM 40 MG: 40 TABLET, DELAYED RELEASE ORAL at 04:58

## 2024-06-02 RX ADMIN — DOXYCYCLINE 100 MG: 100 INJECTION, POWDER, LYOPHILIZED, FOR SOLUTION INTRAVENOUS at 00:29

## 2024-06-02 RX ADMIN — AMIODARONE HYDROCHLORIDE 200 MG: 200 TABLET ORAL at 17:22

## 2024-06-02 RX ADMIN — GUAIFENESIN 1200 MG: 600 TABLET ORAL at 21:05

## 2024-06-02 RX ADMIN — BENZONATATE 100 MG: 100 CAPSULE ORAL at 21:06

## 2024-06-02 NOTE — PROGRESS NOTES
[facility]    Cardiology Progress Note  Noble Chou 78 y.o. female   YOB: 1945 MRN: [id number]  Unit/Bed#: S -Daniela Encounter: [id number]      Subjective:   She remains free of chest pain or shortness of breath    Assessments  78-year-old female with ongoing issues with cough and shortness of breath for the last 2 weeks, has been treated as pneumonia outpatient. She is continuing to have worsening coughing and mild shortness of breath, and had a fever and ended up coming into the hospital at [facility] last night. She also had abdominal pain as well. At [facility] ED, she was found to have elevated troponin and as result was transferred for cardiac catheterization     Principal Problem:    Pneumonia  Active Problems:    Edmondson's esophagus without dysplasia    Pure hypercholesterolemia    Elevated troponin level not due myocardial infarction    Sepsis (HCC)    Abnormal CT scan    Compression deformity of vertebra    Primary hypertension    NSVT (nonsustained ventricular tachycardia) ([facility])    Nonischemic cardiomyopathy ([facility])    Non-MI troponin elevation  New onset cardiomyopathy, LVEF 30%  Frequent NSVT  Hypertension  Hyperlipidemia  LBBB  Known since at least 4 years, and had seen a cardiologist at [facility], but apparently did not have an echocardiogram at that time  Pneumonia        Plan:  [facility] [place] -no significant obstructive CAD  Likely nonischemic cardiomyopathy, possibly viral myocarditis  Optimize medical therapy  Continue metoprolol succinate 50 mg twice daily (no further bradycardia or NSVT)  Continue Entresto 24/26 mg twice daily  Continue amiodarone loading  No further NSVT  Plan to use for 3 months  Continue PO amiodarone load (200 mg tid until 5/30, then decrease to 200 mg bid for 1 week (until 6/6/24), and then continue 200 mg daily  TSH, CMP - normal  Follow up TSH, CMP in 3 months outpatient  Counseled regarding use of LifeVest, and necessity of same and recommended  "continued use postdischarge until improvement of EF or implant of ICD  LifeVest at bedside for patient to use at discharge     Stable for discharge from cardiac standpoint, on current pulmonary treatment for pneumonia needing continued inpatient stay.  We will sign off at this time.  Please call with questions or reconsult if necessary.  Office should call her with an appointment on Monday / post-discahrge    Review of Systems   All other systems reviewed and are negative.      Telemetry Review:  NSR, no further NSVT.    Objective:   Vitals: Blood pressure 118/59, pulse 72, temperature 97.9 °F (36.6 °C), resp. rate 20, height 5' 1.75\" (1.568 m), weight 57.4 kg (126 lb 8.7 oz), SpO2 95%., Body mass index is 23.33 kg/m².,   Orthostatic Blood Pressures      Flowsheet Row Most Recent Value   Blood Pressure 118/59 filed at 2024 2119   Patient Position - Orthostatic VS Sitting filed at 2024 1516           Systolic (24hrs), Av , Min:103 , Max:118     Diastolic (24hrs), Av, Min:52, Max:59    Wt Readings from Last 5 Encounters:   24 57.4 kg (126 lb 8.7 oz)   24 59 kg (130 lb)   24 59.1 kg (130 lb 6.4 oz)   24 58.6 kg (129 lb 3.2 oz)   10/03/22 61.2 kg (135 lb)     I/O          0701   0700  0701   0700  0701   0700    P.O. 360 0     Total Intake(mL/kg) 360 (6.1) 0 (0)     Urine (mL/kg/hr) 200 (0.1) 0 (0)     Total Output 200 0     Net +160 0                        Physical Exam  Vitals and nursing note reviewed.   Constitutional:       General: She is not in acute distress.     Appearance: Normal appearance. She is well-developed. She is not ill-appearing.   HENT:      Head: Normocephalic and atraumatic.      Nose: No congestion.   Eyes:      General: No scleral icterus.     Conjunctiva/sclera: Conjunctivae normal.   Neck:      Vascular: No carotid bruit or JVD.   Cardiovascular:      Rate and Rhythm: Normal rate and regular rhythm.      Pulses: Normal " pulses.      Heart sounds: Normal heart sounds. No murmur heard.     No friction rub. No gallop.   Pulmonary:      Effort: Pulmonary effort is normal. No respiratory distress.      Breath sounds: Normal breath sounds. No rales.   Abdominal:      General: There is no distension.      Palpations: Abdomen is soft.      Tenderness: There is no abdominal tenderness.   Musculoskeletal:         General: No swelling or tenderness.      Cervical back: Neck supple.      Right lower leg: No edema.      Left lower leg: No edema.   Skin:     General: Skin is warm.   Neurological:      General: No focal deficit present.      Mental Status: She is alert and oriented to person, place, and time. Mental status is at baseline.   Psychiatric:         Mood and Affect: Mood normal.         Behavior: Behavior normal.         Thought Content: Thought content normal.         Laboratory Results: personally reviewed        CBC with diff:   Results from last 7 days   Lab Units 06/01/24 0628 05/31/24 0451 05/30/24  0542 05/29/24  0549 05/28/24  0824 05/27/24  1642   WBC Thousand/uL 17.30* 15.56* 14.73* 13.58* 12.77* 18.78*   HEMOGLOBIN g/dL 11.3* 11.4* 11.3* 11.4* 11.6 12.1   HEMATOCRIT % 34.9 34.1* 34.5* 34.9 35.6 38.6   MCV fL 86 83 85 85 84 86   PLATELETS Thousands/uL 463* 420* 465* 432* 399* 415*   RBC Million/uL 4.08 4.13 4.08 4.13 4.23 4.49   MCH pg 27.7 27.6 27.7 27.6 27.4 26.9   MCHC g/dL 32.4 33.4 32.8 32.7 32.6 31.3*   RDW % 14.6 14.6 14.6 14.4 14.2 13.9   MPV fL 9.3 9.2 9.3 9.2 9.2 9.1   NRBC AUTO /100 WBCs 0 0 0  --  0 0         CMP:  Results from last 7 days   Lab Units 06/01/24  0628 05/31/24  0451 05/30/24  0542 05/29/24  0549 05/28/24  0824 05/27/24  1642   POTASSIUM mmol/L 3.4* 3.7 4.0 3.5 3.7 3.7   CHLORIDE mmol/L 106 107 107 103 101 100   CO2 mmol/L 18* 19* 17* 21 23 23   BUN mg/dL 24 22 22 18 17 16   CREATININE mg/dL 0.72 0.71 0.77 0.65 0.60 0.75   CALCIUM mg/dL 7.9* 7.9* 7.6* 8.0* 8.4 8.8   AST U/L  --   --   --   --   --  " 24   ALT U/L  --   --   --   --   --  23   ALK PHOS U/L  --   --   --   --   --  108*   EGFR ml/min/1.73sq m 79 80 73 84 86 75         BMP:  Results from last 7 days   Lab Units 06/01/24  0628 05/31/24  0451 05/30/24  0542 05/29/24  0549 05/28/24  0824 05/27/24  1642   POTASSIUM mmol/L 3.4* 3.7 4.0 3.5 3.7 3.7   CHLORIDE mmol/L 106 107 107 103 101 100   CO2 mmol/L 18* 19* 17* 21 23 23   BUN mg/dL 24 22 22 18 17 16   CREATININE mg/dL 0.72 0.71 0.77 0.65 0.60 0.75   CALCIUM mg/dL 7.9* 7.9* 7.6* 8.0* 8.4 8.8       BNP:   No results for input(s): \"BNP\" in the last 72 hours.      Magnesium:   Results from last 7 days   Lab Units 05/28/24  0824   MAGNESIUM mg/dL 2.5       Coags:   Results from last 7 days   Lab Units 05/29/24  0549 05/28/24  2239 05/28/24  1542 05/28/24  0824 05/28/24  0022 05/27/24  1642   PTT seconds 76* 70* 66* 58* 47* 29   INR   --   --   --   --   --  1.29*       TSH:        Hemoglobin A1C       Lipid Profile:   Results from last 7 days   Lab Units 05/28/24  0824   TRIGLYCERIDES mg/dL 116   HDL mg/dL 49*       Meds/Allergies   all current active meds have been reviewed and current meds:   Current Facility-Administered Medications   Medication Dose Route Frequency    acetaminophen (TYLENOL) tablet 650 mg  650 mg Oral Q6H PRN    albuterol inhalation solution 2.5 mg  2.5 mg Nebulization Q6H PRN    amiodarone tablet 200 mg  200 mg Oral TID With Meals    Followed by    [START ON 6/6/2024] amiodarone tablet 200 mg  200 mg Oral BID With Meals    Followed by    [START ON 6/14/2024] amiodarone tablet 200 mg  200 mg Oral Daily With Breakfast    aspirin chewable tablet 81 mg  81 mg Oral Daily    atorvastatin (LIPITOR) tablet 80 mg  80 mg Oral Daily With Dinner    benzonatate (TESSALON PERLES) capsule 100 mg  100 mg Oral TID    ceftriaxone (ROCEPHIN) 1 g/50 mL in dextrose IVPB  1,000 mg Intravenous Q24H    co-enzyme Q-10 capsule 30 mg  30 mg Oral TID    enoxaparin (LOVENOX) subcutaneous injection 40 mg  40 mg " Subcutaneous Q24H    guaiFENesin (MUCINEX) 12 hr tablet 1,200 mg  1,200 mg Oral Q12H DEVON    lidocaine (LIDODERM) 5 % patch 2 patch  2 patch Topical Daily    metoprolol succinate (TOPROL-XL) 24 hr tablet 50 mg  50 mg Oral Q12H    pantoprazole (PROTONIX) EC tablet 40 mg  40 mg Oral Early Morning    sacubitril-valsartan (ENTRESTO) 24-26 MG per tablet 1 tablet  1 tablet Oral BID       Medications Prior to Admission:     co-enzyme Q-10 30 MG capsule    Cranberry 300 MG tablet    denosumab (PROLIA) 60 mg/mL    fosinopril (MONOPRIL) 10 mg tablet    Multiple Vitamin (multivitamin) tablet    pantoprazole (PROTONIX) 40 mg tablet    pravastatin (PRAVACHOL) 40 mg tablet    TURMERIC PO           Cardiac testing: reviewed  No results found for this or any previous visit.    No results found for this or any previous visit.    No results found for this or any previous visit.    No results found for this or any previous visit.

## 2024-06-02 NOTE — PROGRESS NOTES
[facility]  Progress Note  Name: Noble Chou I  MRN: [id number]  Unit/Bed#: S -01 I Date of Admission: 5/28/2024   Date of Service: 6/2/2024 I Hospital Day: 5    Assessment & Plan   Nonischemic cardiomyopathy (HCC)  Assessment & Plan  Patient noted with severely reduced EF on echocardiogram at 30%.  Patient has been fitted with a LifeVest.  Follow-up with cardiology as an outpatient.     NSVT (nonsustained ventricular tachycardia) (HCC)  Assessment & Plan  Patient noted to have multiple short runs of NSVT.  Cardiology has uptitrated her metoprolol dose.  She is maintained on amiodarone infusion.  Appreciate cardiology's recommendations to transition to p.o. regimen.     Primary hypertension  Assessment & Plan  Had been on fosinopril as an out-patient, which was held for cardiac catheterization.    Patient was also started on metoprolol secondary to NSVT.  Blood pressures have remained stable and acceptable.     Compression deformity of vertebra  Assessment & Plan  Age indetermine L3 compression deformity   Has occasional mild low back pain, none currently.  Patient with low calcium levels and likely has underlying osteopenia or osteoporosis and may benefit from an out-patient DEXA scan.     Abnormal CT scan  Assessment & Plan  Indeterminate 1.3 left renal lesion.  Cannot distinguish hyperdenst cyst from solid neoplasm on single phase CT.    Nonemergent renal protocol or MRI  Abnormal appearance of uterus or vaginal cuff.  Soft tissue density surrounded by rim of fluid is indeterminate but presence of neoplasm cannot be excluded  Non emergent pelvic US  Consider imaging as inpatient vs outpatient.  Patient is aware of above findings     Sepsis (HCC)  Assessment & Plan  Tachycardia, leukocytosis (recently completed course of prednisone on the weekend).  Lactate normal.  Procalcitonin normal.   Source: RLL pneumonia (suspect possible viral)  Antibiotics: completed z vale as outpatient.   Cefepime, vancomycin in ED. transitioned to ceftriaxone.  Concern for aspiration given patient's history of Edmondson's esophagus and speech evaluation.  WBCs are uptrending despite IV ceftriaxone.  Awaiting pulmonary evaluation.  Likely will need to escalate antibiotics.  Urine antigens negative  Blood cultures: Negative to date     Elevated troponin level not due myocardial infarction  Assessment & Plan  Originally presented to [facility] with abdominal pain complaints after completing Z-vale for pneumonia.  Found to have up-trending troponins so was transferred to [facility] for cardiac catheterization.  Cardiology consulted and appreciate their recommendations.  EKG: LBBB, PACs  Loaded with Brilenta, ASA and started on ACS heparin originally.  Increase statin from pravastatin to lipitor 80 mg  Started metoprolol tartrate with dose escalation to 50 mg every 8 hours secondary to NSVT.  LHC (5/29/24) without obstructive CAD  ECHO (5/28/24): EF 30% with grade 1 diastolic dysfunction.    Pure hypercholesterolemia  Assessment & Plan  Maintained on pravastatin as outpatient  Increase to atorvastatin 80 mg  Lipid panel ordered  HDL mildly low, rest of lipid panel at goal     Edmondson's esophagus without dysplasia  Assessment & Plan  Patient with a known history of Edmondson's esophagus.  She follows with GI as an outpatient.     * Pneumonia  Assessment & Plan  Presented to ED with abdominal pain, cough.  CT: RLL pneumonia. Small right pleural effusion. Patchy left sided opacities with endoluminal debris.  Consideration for aspiration.   Antibiotics   Completed Z vale as outpatient  Cefepime, vancomycin in ED. Transition to ceftriaxone and doxycycline.  Doxycycline was discontinued with normal urinary antigens.  Patient remains on ceftriaxone however WBCs are up trending.  Tessalon TID, mucinex BID  Respiratory protocol placed - patient declined breathing treatments  Pulmonary consulted and appreciate their recommendations.   "Patient may require bronchoscopy based upon findings of debris in the airways.    Appreciate pulmonology recommendations, continue ceftriaxone at this time, respiratory protocol             PPX: Lovenox  Diet: Cardiac diet  Code Status: Level 1 full code  Dispo: Discharge tomorrow    Subjective:   Patient seen and examined at bedside this morning.  Patient reports no complaints besides cough related to her pneumonia.  Patient's right cubital region is swollen and erythematous.  Ultrasound shows no clot but superficial thrombophlebitis.  Patient is okay for discharge however medications sent to pharmacy approximately 1 hour away closes at 5 PM.  Family and patient opted to stay overnight and discharge tomorrow.    Objective:     Vitals: Blood pressure (!) 100/48, pulse 66, temperature 98.6 °F (37 °C), resp. rate 20, height 5' 1.75\" (1.568 m), weight 57.4 kg (126 lb 8.7 oz), SpO2 96%.,Body mass index is 23.33 kg/m².      Intake/Output Summary (Last 24 hours) at 6/2/2024 1423  Last data filed at 6/1/2024 1900  Gross per 24 hour   Intake 540 ml   Output 150 ml   Net 390 ml       Physical Exam:   Physical Exam  Vitals reviewed.   Constitutional:       General: She is not in acute distress.     Appearance: Normal appearance. She is well-developed. She is not ill-appearing.   HENT:      Head: Normocephalic and atraumatic.      Nose: No congestion.   Eyes:      General: No scleral icterus.     Conjunctiva/sclera: Conjunctivae normal.   Neck:      Vascular: No carotid bruit or JVD.   Cardiovascular:      Rate and Rhythm: Normal rate and regular rhythm.      Pulses: Normal pulses.      Heart sounds: Normal heart sounds. No murmur heard.     No friction rub. No gallop.   Pulmonary:      Effort: Pulmonary effort is normal. No respiratory distress.      Breath sounds: Wheezing present. No rales.   Abdominal:      General: There is no distension.      Palpations: Abdomen is soft.      Tenderness: There is no abdominal tenderness. "   Musculoskeletal:         General: No swelling or tenderness.      Cervical back: Neck supple.      Right lower leg: No edema.      Left lower leg: No edema.   Skin:     General: Skin is warm.   Neurological:      General: No focal deficit present.      Mental Status: She is alert and oriented to person, place, and time. Mental status is at baseline.   Psychiatric:         Mood and Affect: Mood normal.         Behavior: Behavior normal.         Thought Content: Thought content normal.         Invasive Devices       Peripheral Intravenous Line  Duration             Peripheral IV 05/31/24 Distal;Left;Upper;Ventral (anterior) Arm 2 days                               Lab and other studies:  I have personally reviewed pertinent reports.     No results displayed because visit has over 200 results.          Recent Results (from the past 24 hour(s))   CBC and differential    Collection Time: 06/02/24  6:32 AM   Result Value Ref Range    WBC 14.97 (H) 4.31 - 10.16 Thousand/uL    RBC 3.86 3.81 - 5.12 Million/uL    Hemoglobin 10.7 (L) 11.5 - 15.4 g/dL    Hematocrit 33.0 (L) 34.8 - 46.1 %    MCV 86 82 - 98 fL    MCH 27.7 26.8 - 34.3 pg    MCHC 32.4 31.4 - 37.4 g/dL    RDW 15.2 (H) 11.6 - 15.1 %    MPV 9.2 8.9 - 12.7 fL    Platelets 458 (H) 149 - 390 Thousands/uL    nRBC 0 /100 WBCs    Segmented % 74 43 - 75 %    Immature Grans % 1 0 - 2 %    Lymphocytes % 16 14 - 44 %    Monocytes % 7 4 - 12 %    Eosinophils Relative 2 0 - 6 %    Basophils Relative 0 0 - 1 %    Absolute Neutrophils 11.14 (H) 1.85 - 7.62 Thousands/µL    Absolute Immature Grans 0.14 0.00 - 0.20 Thousand/uL    Absolute Lymphocytes 2.33 0.60 - 4.47 Thousands/µL    Absolute Monocytes 1.10 0.17 - 1.22 Thousand/µL    Eosinophils Absolute 0.24 0.00 - 0.61 Thousand/µL    Basophils Absolute 0.02 0.00 - 0.10 Thousands/µL   Comprehensive metabolic panel    Collection Time: 06/02/24  6:32 AM   Result Value Ref Range    Sodium 136 135 - 147 mmol/L    Potassium 4.2 3.5 - 5.3  "mmol/L    Chloride 111 (H) 96 - 108 mmol/L    CO2 17 (L) 21 - 32 mmol/L    ANION GAP 8 4 - 13 mmol/L    BUN 25 5 - 25 mg/dL    Creatinine 0.74 0.60 - 1.30 mg/dL    Glucose 104 65 - 140 mg/dL    Calcium 8.0 (L) 8.4 - 10.2 mg/dL    Corrected Calcium 8.8 8.3 - 10.1 mg/dL    AST 22 13 - 39 U/L    ALT 24 7 - 52 U/L    Alkaline Phosphatase 72 34 - 104 U/L    Total Protein 5.9 (L) 6.4 - 8.4 g/dL    Albumin 3.0 (L) 3.5 - 5.0 g/dL    Total Bilirubin 0.31 0.20 - 1.00 mg/dL    eGFR 76 ml/min/1.73sq m     Blood Culture:   Lab Results   Component Value Date    BLOODCX No Growth After 5 Days. 05/27/2024    BLOODCX No Growth After 5 Days. 05/27/2024   ,   Urinalysis:   Lab Results   Component Value Date    COLORU Yellow 05/27/2024    CLARITYU Clear 05/27/2024    SPECGRAV <1.005 (L) 05/27/2024    PHUR 6.5 05/27/2024    LEUKOCYTESUR Negative 05/27/2024    NITRITE Negative 05/27/2024    GLUCOSEU Negative 05/27/2024    KETONESU 10 (1+) (A) 05/27/2024    BILIRUBINUR Negative 05/27/2024    BLOODU Small (A) 05/27/2024   ,   Urine Culture: No results found for: \"URINECX\",   Wound Culure: No results found for: \"WOUNDCULT\"      Imaging:    VAS upper limb venous duplex scan, unilateral/limited   Final Result by Maegan Schwartz MD (06/02 1135)      US pelvis complete w transvaginal   Final Result by Cali Cedeno MD (06/01 1007)      Submucosal fibroid measuring 1.8 cm.      Additional 2.2 cm heterogeneous shadowing lesion projecting into the endometrial canal, which may represent an intracavitary fibroid, although neoplasm cannot be entirely excluded.      Dilated fluid-filled endometrial canal to the level of the internal cervical os, suggesting cervical stenosis. Endometrial lining is within normal limits.         Workstation performed: [id number]                  VTE Pharmacologic Prophylaxis: Enoxaparin (Lovenox)  VTE Mechanical Prophylaxis: sequential compression device and foot pump applied    Current Facility-Administered " Medications   Medication Dose Route Frequency    acetaminophen (TYLENOL) tablet 650 mg  650 mg Oral Q6H PRN    albuterol inhalation solution 2.5 mg  2.5 mg Nebulization Q6H PRN    amiodarone tablet 200 mg  200 mg Oral TID With Meals    Followed by    [START ON 6/6/2024] amiodarone tablet 200 mg  200 mg Oral BID With Meals    Followed by    [START ON 6/14/2024] amiodarone tablet 200 mg  200 mg Oral Daily With Breakfast    aspirin chewable tablet 81 mg  81 mg Oral Daily    atorvastatin (LIPITOR) tablet 80 mg  80 mg Oral Daily With Dinner    benzonatate (TESSALON PERLES) capsule 100 mg  100 mg Oral TID    ceftriaxone (ROCEPHIN) 1 g/50 mL in dextrose IVPB  1,000 mg Intravenous Q24H    co-enzyme Q-10 capsule 30 mg  30 mg Oral TID    dextromethorphan-guaiFENesin (ROBITUSSIN DM) oral syrup 10 mL  10 mL Oral Q4H PRN    doxycycline hyclate (VIBRAMYCIN) capsule 100 mg  100 mg Oral Q12H DEVON    enoxaparin (LOVENOX) subcutaneous injection 40 mg  40 mg Subcutaneous Q24H    guaiFENesin (MUCINEX) 12 hr tablet 1,200 mg  1,200 mg Oral Q12H DEVON    lidocaine (LIDODERM) 5 % patch 2 patch  2 patch Topical Daily    metoprolol succinate (TOPROL-XL) 24 hr tablet 50 mg  50 mg Oral Q12H    pantoprazole (PROTONIX) EC tablet 40 mg  40 mg Oral Early Morning    sacubitril-valsartan (ENTRESTO) 24-26 MG per tablet 1 tablet  1 tablet Oral BID       Tricia Galeano DO  Family Medicine  2:23 PM

## 2024-06-02 NOTE — QUICK NOTE
Spoke to family and patient at bedside.  Reviewed all medications that were added during this admission.  Patient has appropriate follow-up with cardiology outpatient.  Reviewed ultrasound results showing superficial thrombophlebitis, no clot at this time.  Patient will be discharged tomorrow, cannot be discharged today due to pharmacy closing at 5 PM and it being approximately 1 hour away.    Tricia Galeano,   Family Medicine  2:28 PM

## 2024-06-02 NOTE — CASE MANAGEMENT
Case Management Progress Note    Patient name Noble Chou  Location S /S -01 MRN [id number]  : 1945 Date 2024       LOS (days): 5  Geometric Mean LOS (GMLOS) (days): 5.4  Days to GMLOS:-0.3        OBJECTIVE:        Current admission status: Inpatient  Preferred Pharmacy:   [facility]/pharmacy #[id number] - [place] - [address]  [address] [place] [address]  Phone: [phone] Fax: [phone]    [facility]/pharmacy #[id number] - [place] - [address]  [address] [place] [address]  Phone: [phone] Fax: [phone]    Primary Care Provider: Angela Phelps MD    Primary Insurance: [employer]  Secondary Insurance:     PROGRESS NOTE:    Call made to [facility] pharmacy and pharmacist confirmed that all medication ordered is in stock and able to be picked up today. MD aware of same, but family remains concerned about location of pharmacy (1 hour away) and ability for them to get to the pharmacy prior to it closing as all family members currently at bedside. MD relayed plan [facility] tomorrow [facility]

## 2024-06-02 NOTE — QUICK NOTE
Was notified by the nurse that the patient has right arm swelling.  On exam patient has mild swelling and redness at the IV insertion site which was removed, there is visible purulent induration.  Picture can be seen in the media.  At this point I ordered right arm venous Doppler to rule out DVT.  Recommended arm elevation with warm compress, Tylenol for pain.  Started patient on doxycycline to cover MRSA.  Will defer further treatment to the primary team in the morning.

## 2024-06-02 NOTE — CASE MANAGEMENT
Case Management Discharge Planning Note    Patient name Noble Chou  Location S /S -01 MRN [id number]  : 1945 Date 2024       Current Admission Date: 2024  Current Admission Diagnosis:Pneumonia   Patient Active Problem List    Diagnosis Date Noted Date Diagnosed    NSVT (nonsustained ventricular tachycardia) ([facility]) 2024     Nonischemic cardiomyopathy (HCC) 2024     Primary hypertension 2024     Elevated troponin level not due myocardial infarction 2024     Sepsis (HCC) 2024     Pneumonia 2024     Abnormal CT scan 2024     Compression deformity of vertebra 2024     Bundle-branch block      Gastroesophageal reflux disease with esophagitis without hemorrhage 2020     History of colon polyps 2020     History of total right hip replacement 2019     Right upper quadrant abdominal pain 2019     Edmondson's esophagus without dysplasia 2019     Constipation 2019     Pure hypercholesterolemia 2019       LOS (days): 5  Geometric Mean LOS (GMLOS) (days): 5.4  Days to GMLOS:-0.1     OBJECTIVE:  Risk of Unplanned Readmission Score: 12.54         Current admission status: Inpatient   Preferred Pharmacy:   [id number] [place] - [address]  [address]  [facility] [address]  Phone: [phone] Fax: [phone]    [id number] [address]  [address] [facility] [address]  Phone: [phone] Fax: [phone]    Primary Care Provider: Angela Phelps MD    Primary Insurance: [employer]  Secondary Insurance:     DISCHARGE DETAILS:    Discharge planning discussed with:: patient and family (spouse, son and daughter-in-law) at bedside  Freedom of Choice: Yes (re: home care)  Comments - Freedom of Choice: options reviewed and they selected [facility] as preferred agency  CM contacted family/caregiver?: Yes  Were Treatment Team  discharge recommendations reviewed with patient/caregiver?: Yes  Did patient/caregiver verbalize understanding of patient care needs?: Yes  Were patient/caregiver advised of the risks associated with not following Treatment Team discharge recommendations?: Yes    Contacts  Patient Contacts: Yoel, spouse  Relationship to Patient:: Family  Contact Method: In Person  Reason/Outcome: Emergency Contact, Discharge Planning, Continuity of Care, Referral    Requested Home Health Care         Is the patient interested in [facility] at discharge?: Yes  Home Health Discipline requested:: Nursing, Occupational Therapy, Physical Therapy  [facility]  HHA External Referral Reason (only applicable if external HHA name selected): Scheduling access issues  Home Health Follow-Up Provider:: PCP  Home Health Services Needed:: Evaluate Functional Status and Safety, Gait/ADL Training, Strengthening/Theraputic Exercises to Improve Function, Heart Failure Management  Homebound Criteria Met:: Uses an Assist Device (i.e. cane, walker, etc), Requires the Assistance of Another Person for Safe Ambulation or to Leave the Home  Supporting Clincal Findings:: Fatigues Easliy in Short Distances, Limited Endurance    DME Referral Provided  Referral made for DME?: Yes  DME referral completed for the following items:: Other ([facility]est)    Other Referral/Resources/Interventions Provided:  Interventions: LifeVest, HHC  Referral Comments: Met with patient and family to follow-up on home care services and anticipated d/c for today. Home care options provided and patient/family selected [facility] as preferred agency. Same reserved in AIDIN and information for agency added to AVS for their records. Family aware that [facility] will be contacting to schedule start of care for home care services. Family inquiring about medication at d/c - aware that [facility] is listed as preferred pharmacy - family confirmed same. They would like  to confirm all medication will be available for pick-up today. TT sent to MD to request d/c meds be ordered so this writer can follow-up on same. Family otherwise ok with d/c home this afternoon after speaking with MD. MD aware and to come to floor to speak with with.    Would you like to participate in our [facility] service program?  : No - Declined    Treatment Team Recommendation: Home with Home Health Care  Discharge Destination Plan:: Home with Home Health Care ([facility])  Transport at Discharge : Family

## 2024-06-03 VITALS
HEIGHT: 62 IN | TEMPERATURE: 98.3 F | WEIGHT: 126.54 LBS | BODY MASS INDEX: 23.29 KG/M2 | DIASTOLIC BLOOD PRESSURE: 46 MMHG | RESPIRATION RATE: 20 BRPM | SYSTOLIC BLOOD PRESSURE: 103 MMHG | OXYGEN SATURATION: 94 % | HEART RATE: 59 BPM

## 2024-06-03 PROCEDURE — 99232 SBSQ HOSP IP/OBS MODERATE 35: CPT | Performed by: INTERNAL MEDICINE

## 2024-06-03 PROCEDURE — 99239 HOSP IP/OBS DSCHRG MGMT >30: CPT | Performed by: INTERNAL MEDICINE

## 2024-06-03 RX ADMIN — ASPIRIN 81 MG CHEWABLE TABLET 81 MG: 81 TABLET CHEWABLE at 08:48

## 2024-06-03 RX ADMIN — SACUBITRIL AND VALSARTAN 1 TABLET: 24; 26 TABLET, FILM COATED ORAL at 08:49

## 2024-06-03 RX ADMIN — Medication 30 MG: at 08:48

## 2024-06-03 RX ADMIN — BENZONATATE 100 MG: 100 CAPSULE ORAL at 08:49

## 2024-06-03 RX ADMIN — AMIODARONE HYDROCHLORIDE 200 MG: 200 TABLET ORAL at 08:29

## 2024-06-03 RX ADMIN — DOXYCYCLINE 100 MG: 100 CAPSULE ORAL at 08:48

## 2024-06-03 RX ADMIN — GUAIFENESIN 1200 MG: 600 TABLET ORAL at 08:48

## 2024-06-03 RX ADMIN — METOPROLOL SUCCINATE 50 MG: 50 TABLET, EXTENDED RELEASE ORAL at 08:48

## 2024-06-03 RX ADMIN — PANTOPRAZOLE SODIUM 40 MG: 40 TABLET, DELAYED RELEASE ORAL at 05:53

## 2024-06-03 RX ADMIN — CEFTRIAXONE SODIUM 1000 MG: 10 INJECTION, POWDER, FOR SOLUTION INTRAVENOUS at 06:03

## 2024-06-03 RX ADMIN — LIDOCAINE 5% 2 PATCH: 700 PATCH TOPICAL at 05:53

## 2024-06-03 RX ADMIN — AMIODARONE HYDROCHLORIDE 200 MG: 200 TABLET ORAL at 12:11

## 2024-06-03 NOTE — PLAN OF CARE
Problem: PAIN - ADULT  Goal: Verbalizes/displays adequate comfort level or baseline comfort level  Description: Interventions:  - Encourage patient to monitor pain and request assistance  - Assess pain using appropriate pain scale  - Administer analgesics based on type and severity of pain and evaluate response  - Implement non-pharmacological measures as appropriate and evaluate response  - Consider cultural and social influences on pain and pain management  - Notify physician/advanced practitioner if interventions unsuccessful or patient reports new pain  Outcome: Progressing     Problem: INFECTION - ADULT  Goal: Absence or prevention of progression during hospitalization  Description: INTERVENTIONS:  - Assess and monitor for signs and symptoms of infection  - Monitor lab/diagnostic results  - Monitor all insertion sites, i.e. indwelling lines, tubes, and drains  - Monitor endotracheal if appropriate and nasal secretions for changes in amount and color  - [place] appropriate cooling/warming therapies per order  - Administer medications as ordered  - Instruct and encourage patient and family to use good hand hygiene technique  - Identify and instruct in appropriate isolation precautions for identified infection/condition  Outcome: Progressing  Goal: Absence of fever/infection during neutropenic period  Description: INTERVENTIONS:  - Monitor WBC    Outcome: Progressing     Problem: SAFETY ADULT  Goal: Patient will remain free of falls  Description: INTERVENTIONS:  - Educate patient/family on patient safety including physical limitations  - Instruct patient to call for assistance with activity   - Consult OT/PT to assist with strengthening/mobility   - Keep Call bell within reach  - Keep bed low and locked with side rails adjusted as appropriate  - Keep care items and personal belongings within reach  - Apply yellow socks and bracelet for high fall risk patients  - Consider moving patient to room near nurses  station  Outcome: Progressing  Goal: Maintain or return to baseline ADL function  Description: INTERVENTIONS:  -  Assess patient's ability to carry out ADLs; assess patient's baseline for ADL function and identify physical deficits which impact ability to perform ADLs (bathing, care of mouth/teeth, toileting, grooming, dressing, etc.)  - Assess/evaluate cause of self-care deficits   - Assess range of motion  - Assess patient's mobility; develop plan if impaired  - Assess patient's need for assistive devices and provide as appropriate  - Encourage maximum independence but intervene and supervise when necessary  - Involve family in performance of ADLs  - Assess for home care needs following discharge   - Consider OT consult to assist with ADL evaluation and planning for discharge  - Provide patient education as appropriate  Outcome: Progressing  Goal: Maintains/Returns to pre admission functional level  Description: INTERVENTIONS:  - Perform AM-PAC 6 Click Basic Mobility/ Daily Activity assessment daily.  - Set and communicate daily mobility goal to care team and patient/family/caregiver.   - Collaborate with rehabilitation services on mobility goals if consulted  - Perform Range of Motion   - Out of bed for toileting  - Record patient progress and toleration of activity level   Outcome: Progressing     Problem: DISCHARGE PLANNING  Goal: Discharge to home or other facility with appropriate resources  Description: INTERVENTIONS:  - Identify barriers to discharge w/patient and caregiver  - Arrange for needed discharge resources and transportation as appropriate  - Identify discharge learning needs (meds, wound care, etc.)  - Arrange for interpretive services to assist at discharge as needed  - Refer to Case Management Department for coordinating discharge planning if the patient needs post-hospital services based on physician/advanced practitioner order or complex needs related to functional status, cognitive ability,  or social support system  Outcome: Progressing     Problem: Knowledge Deficit  Goal: Patient/family/caregiver demonstrates understanding of disease process, treatment plan, medications, and discharge instructions  Description: Complete learning assessment and assess knowledge base.  Interventions:  - Provide teaching at level of understanding  - Provide teaching via preferred learning methods  Outcome: Progressing     Problem: Nutrition/Hydration-ADULT  Goal: Nutrient/Hydration intake appropriate for improving, restoring or maintaining nutritional needs  Description: Monitor and assess patient's nutrition/hydration status for malnutrition. Collaborate with interdisciplinary team and initiate plan and interventions as ordered.  Monitor patient's weight and dietary intake as ordered or per policy. Utilize nutrition screening tool and intervene as necessary. Determine patient's food preferences and provide high-protein, high-caloric foods as appropriate.     INTERVENTIONS:  - Monitor oral intake, urinary output, labs, and treatment plans  - Assess nutrition and hydration status and recommend course of action  - Evaluate amount of meals eaten  - Assist patient with eating if necessary   - Allow adequate time for meals  - Recommend/ encourage appropriate diets, oral nutritional supplements, and vitamin/mineral supplements  - Order, calculate, and assess calorie counts as needed  - Recommend, monitor, and adjust tube feedings and TPN/PPN based on assessed needs  - Assess need for intravenous fluids  - Provide specific nutrition/hydration education as appropriate  - Include patient/family/caregiver in decisions related to nutrition  Outcome: Progressing

## 2024-06-03 NOTE — PLAN OF CARE
Problem: PAIN - ADULT  Goal: Verbalizes/displays adequate comfort level or baseline comfort level  Description: Interventions:  - Encourage patient to monitor pain and request assistance  - Assess pain using appropriate pain scale  - Administer analgesics based on type and severity of pain and evaluate response  - Implement non-pharmacological measures as appropriate and evaluate response  - Consider cultural and social influences on pain and pain management  - Notify physician/advanced practitioner if interventions unsuccessful or patient reports new pain  Outcome: Progressing     Problem: INFECTION - ADULT  Goal: Absence or prevention of progression during hospitalization  Description: INTERVENTIONS:  - Assess and monitor for signs and symptoms of infection  - Monitor lab/diagnostic results  - Monitor all insertion sites, i.e. indwelling lines, tubes, and drains  - Monitor endotracheal if appropriate and nasal secretions for changes in amount and color  - [place] appropriate cooling/warming therapies per order  - Administer medications as ordered  - Instruct and encourage patient and family to use good hand hygiene technique  - Identify and instruct in appropriate isolation precautions for identified infection/condition  Outcome: Progressing  Goal: Absence of fever/infection during neutropenic period  Description: INTERVENTIONS:  - Monitor WBC    Outcome: Progressing     Problem: SAFETY ADULT  Goal: Patient will remain free of falls  Description: INTERVENTIONS:  - Educate patient/family on patient safety including physical limitations  - Instruct patient to call for assistance with activity   - Consult OT/PT to assist with strengthening/mobility   - Keep Call bell within reach  - Keep bed low and locked with side rails adjusted as appropriate  - Keep care items and personal belongings within reach  - Initiate and maintain comfort rounds  - Make Fall Risk Sign visible to staff  - Offer Toileting every 2 Hours,  in advance of need  - Initiate/Maintain alarm  - Obtain necessary fall risk management equipment:   - Apply yellow socks and bracelet for high fall risk patients  - Consider moving patient to room near nurses station  Outcome: Progressing  Goal: Maintain or return to baseline ADL function  Description: INTERVENTIONS:  -  Assess patient's ability to carry out ADLs; assess patient's baseline for ADL function and identify physical deficits which impact ability to perform ADLs (bathing, care of mouth/teeth, toileting, grooming, dressing, etc.)  - Assess/evaluate cause of self-care deficits   - Assess range of motion  - Assess patient's mobility; develop plan if impaired  - Assess patient's need for assistive devices and provide as appropriate  - Encourage maximum independence but intervene and supervise when necessary  - Involve family in performance of ADLs  - Assess for home care needs following discharge   - Consider OT consult to assist with ADL evaluation and planning for discharge  - Provide patient education as appropriate  Outcome: Progressing  Goal: Maintains/Returns to pre admission functional level  Description: INTERVENTIONS:  - Perform AM-PAC 6 Click Basic Mobility/ Daily Activity assessment daily.  - Set and communicate daily mobility goal to care team and patient/family/caregiver.   - Collaborate with rehabilitation services on mobility goals if consulted  - Perform Range of Motion  times a day.  - Reposition patient every  hours.  - Dangle patient  times a day  - Stand patient  times a day  - Ambulate patient  times a day  - Out of bed to chair  times a day   - Out of bed for meals  times a day  - Out of bed for toileting  - Record patient progress and toleration of activity level   Outcome: Progressing     Problem: DISCHARGE PLANNING  Goal: Discharge to home or other facility with appropriate resources  Description: INTERVENTIONS:  - Identify barriers to discharge w/patient and caregiver  - Arrange for  needed discharge resources and transportation as appropriate  - Identify discharge learning needs (meds, wound care, etc.)  - Arrange for interpretive services to assist at discharge as needed  - Refer to Case Management Department for coordinating discharge planning if the patient needs post-hospital services based on physician/advanced practitioner order or complex needs related to functional status, cognitive ability, or social support system  Outcome: Progressing     Problem: Knowledge Deficit  Goal: Patient/family/caregiver demonstrates understanding of disease process, treatment plan, medications, and discharge instructions  Description: Complete learning assessment and assess knowledge base.  Interventions:  - Provide teaching at level of understanding  - Provide teaching via preferred learning methods  Outcome: Progressing     Problem: Nutrition/Hydration-ADULT  Goal: Nutrient/Hydration intake appropriate for improving, restoring or maintaining nutritional needs  Description: Monitor and assess patient's nutrition/hydration status for malnutrition. Collaborate with interdisciplinary team and initiate plan and interventions as ordered.  Monitor patient's weight and dietary intake as ordered or per policy. Utilize nutrition screening tool and intervene as necessary. Determine patient's food preferences and provide high-protein, high-caloric foods as appropriate.     INTERVENTIONS:  - Monitor oral intake, urinary output, labs, and treatment plans  - Assess nutrition and hydration status and recommend course of action  - Evaluate amount of meals eaten  - Assist patient with eating if necessary   - Allow adequate time for meals  - Recommend/ encourage appropriate diets, oral nutritional supplements, and vitamin/mineral supplements  - Order, calculate, and assess calorie counts as needed  - Recommend, monitor, and adjust tube feedings and TPN/PPN based on assessed needs  - Assess need for intravenous fluids  -  Provide specific nutrition/hydration education as appropriate  - Include patient/family/caregiver in decisions related to nutrition  Outcome: Progressing     Problem: Nutrition/Hydration-ADULT  Goal: Nutrient/Hydration intake appropriate for improving, restoring or maintaining nutritional needs  Description: Monitor and assess patient's nutrition/hydration status for malnutrition. Collaborate with interdisciplinary team and initiate plan and interventions as ordered.  Monitor patient's weight and dietary intake as ordered or per policy. Utilize nutrition screening tool and intervene as necessary. Determine patient's food preferences and provide high-protein, high-caloric foods as appropriate.     INTERVENTIONS:  - Monitor oral intake, urinary output, labs, and treatment plans  - Assess nutrition and hydration status and recommend course of action  - Evaluate amount of meals eaten  - Assist patient with eating if necessary   - Allow adequate time for meals  - Recommend/ encourage appropriate diets, oral nutritional supplements, and vitamin/mineral supplements  - Order, calculate, and assess calorie counts as needed  - Recommend, monitor, and adjust tube feedings and TPN/PPN based on assessed needs  - Assess need for intravenous fluids  - Provide specific nutrition/hydration education as appropriate  - Include patient/family/caregiver in decisions related to nutrition  Outcome: Progressing

## 2024-06-03 NOTE — DISCHARGE SUMMARY
Discharge Summary - [facility] Internal Medicine    Patient Information: Noble Chou 78 y.o. female MRN: [id number]  Unit/Bed#: S -01 Encounter: [id number]    Discharging Physician / Practitioner: Shabana Calhoun MD  PCP: Angela Phelps MD  Admission Date: 5/28/2024  Discharge Date: 06/03/24    Reason for Admission: No chief complaint on file.      Discharge Diagnoses:     Primary Discharge Diagnosis:   Principal Problem:    Pneumonia  Active Problems:    Edmondson's esophagus without dysplasia    Pure hypercholesterolemia    Elevated troponin level not due myocardial infarction    Sepsis (HCC)    Abnormal CT scan    Compression deformity of vertebra    Primary hypertension    NSVT (nonsustained ventricular tachycardia) (HCC)    Nonischemic cardiomyopathy (HCC)  Resolved Problems:    * No resolved hospital problems. *      Consultations During Hospital Stay:  IP CONSULT TO CARDIOLOGY  IP CONSULT TO PULMONOLOGY    Procedures Performed:   Significant Findings:   Refer to hospital course and above listed diagnosis related plan for details    Imaging while in hospital:  CT angiogram of the chest PE study.  Right  upper extremity venous duplex  Ultrasound of the pelvis.    Incidental Findings:     Test Results Pending at Discharge (will require follow up):   As per After Visit Summary     Outpatient Tests Requested:  Complications:  Refer to hospital course and above listed diagnosis related plan, if any    Hospital Course:     Noble Chou is a 78 y.o. female patient with PMHx of hypertension, hyperlipidemia, osteoporosis, Edmondson's esophagus who originally presented to the hospital on 5/28/2024 due to chief complaints of cough, abdominal pain for a week and a half.  Patient was recently seen by the urgent care center and was diagnosed to have pneumonia and was treated with prednisone and Z-Parth.  However even when the patient was on the course of antibiotic patient did not feel better and hence came to the  "ED.  In the ED patient was found to be in sepsis secondary to possibly pneumonia, troponins were elevated as well.   And most likely demand ischemia for the troponin elevation.    Patient was treated with broad-spectrum antibiotic including vancomycin and ceftriaxone.  Patient was seen by cardiology and had nonsustained V. tach, and was noted to have echocardiogram with a EF of 30%.  It was suspected patient possibly had viral myocarditis and underwent cardiac catheterization which did not show any significant coronary artery disease.  Given the fact patient had nonsustained V. tach, cardiomyopathy nonischemic, patient was started on Entresto and metoprolol she tolerated well.  She was also treated with amiodarone.  And was recommended to follow-up with cardiology as an outpatient.    Patient's prescriptions were also sent to the pharmacy.  And given the patient's low EF of 30% patient was also given LifeVest which was delivered at the bedside.      Patient had some cough gradually prescribed a course of doxycycline 100 mg twice a day for 7 more days.   But patient had did not have any EKG changes.    Was feeling better and she was then discharged on 6/3/2024.  Please see above list of diagnoses and related plan for additional information.       Condition at Discharge: fair     Discharge Day Visit / Exam:     Subjective:  I have seen and examined the patient at bedside. Overnight events reviewed with the RN.     Vitals: Blood Pressure: (!) 103/46 (06/03/24 0730)  Pulse: 59 (06/03/24 0730)  Temperature: 98.3 °F (36.8 °C) (06/03/24 0730)  Temp Source: Oral (06/01/24 1516)  Respirations: 20 (06/01/24 1516)  Height: 5' 1.75\" (156.8 cm) (05/28/24 1030)  Weight - Scale: 57.4 kg (126 lb 8.7 oz) (06/01/24 0629)  SpO2: 94 % (06/03/24 0730)  Exam:   Physical Exam  General Exam: Alert and Oriented x 3, NAD  Eyes: BERT  Neck: Supple.   CVS: S1, S2 [place], RRR.   R/S: Clear to auscultate anteriorly.  No Rales or rhonchi " "appreciated.  Abd: Soft, NT, ND, BS+ve  Extremities: No edema noted.   Skin: No acute Rash noted.   CNS: No acute FND. Moves all 4 extremities.   Psych: Co-operative, Not agitated.     Discharge instructions/Information to patient and family:(Discharge Medications and Follow up):   See after visit summary for information provided to patient and family.      Provisions for Follow-Up Care:  See after visit summary for information related to follow-up care and any pertinent home health orders.      Disposition: Home    Planned Readmission:  No     Discharge Statement:  I spent 35 minutes discharging the patient. This time was spent on the day of discharge. I had direct contact with the patient on the day of discharge. Greater than 50% of the total time was spent examining patient, answering all patient questions, arranging and discussing plan of care with patient as well as directly providing post-discharge instructions.  Additional time then spent on discharge activities.    Discharge Medications:  See after visit summary for reconciled discharge medications provided to patient and family.      ** Please Note: \"This note has been constructed using a voice recognition system.Therefore there may be syntax, spelling, and/or grammatical errors. Please call if you have any questions. \"**            "

## 2024-06-03 NOTE — PROGRESS NOTES
Consultation - Pulmonary Medicine   Noble Chou 78 y.o. female MRN: [id number]  Unit/Bed#: S -01 Encounter: [id number]      Assessment:  Sepsis secondary to right lower lobe community-acquired pneumonia  Nonischemic cardiomyopathy, suspected to be viral etiology  Frequent NSVT  Edmondson's esophagus    Plan:   Today is day 7 of ceftriaxone, no further outpatient antibiotics recommended.  No need for doxycycline.  Will defer bronchoscopy at this time  Continue pulmonary toileting with flutter valve, as needed albuterol inhaler and nebulizer  Continue PPI on discharge  Repeat chest x-ray in 8 to 12 weeks to monitor for resolution of right lower lobe opacity  Patient okay to be discharged from pulmonary standpoint      History of Present Illness   Physician Requesting Consult: Shabana Calhoun MD  Reason for Consult / Principal Problem: Right lower lobe pneumonia  Hx and PE limited by:   HPI: Noble Chou is a 78 y.o. year old female with past medical history of hypertension, hyperlipidemia, osteoporosis, Edmondson esophagus who presents with 1 to 2 weeks of cough and shortness of breath.  She presented to [facility] initially and was found to have right lower lobe pneumonia.  Patient was also found to have elevated troponins in the 300s so she was transferred to [facility] for potential cath.  Previous to admission, patient was given a Z-Parth and oral prednisone for her symptoms persevered requiring her to go to the hospital.  In the hospital patient has gotten cefepime and vancomycin in the ED, transition to ceftriaxone which she has now completed 7 days of therapy.  Patient underwent left heart catheterization which resulted in no significant obstructive CAD.  She was found to have a nonischemic cardiomyopathy which cardiology is suspecting viral myocarditis, and patient was started on goal-directed medical therapy.  She was also started on amiodarone for frequent NSVT's discharged with a  LifeVest.  Regarding the patient's cough and shortness of breath, patient states that her symptoms have much improved since presentation to the hospital.  She is no longer coughing, she is currently off oxygen and ambulating without any respiratory distress.  She is afebrile.    Review of Systems   Constitutional:  Negative for chills and fever.   HENT:  Negative for ear pain and sore throat.    Eyes:  Negative for pain and visual disturbance.   Respiratory:  Positive for cough. Negative for shortness of breath.    Cardiovascular:  Negative for chest pain, palpitations and leg swelling.   Gastrointestinal:  Positive for diarrhea. Negative for abdominal pain and vomiting.   Genitourinary:  Negative for dysuria and hematuria.   Musculoskeletal:  Negative for arthralgias and back pain.   Skin:  Negative for color change and rash.   Neurological:  Negative for seizures and syncope.   Psychiatric/Behavioral:  Negative for confusion.    All other systems reviewed and are negative.      Historical Information   Past Medical History:   Diagnosis Date    Edmondson esophagus     Bundle-branch block     Cervical spine fracture (HCC)     Colon polyp     GERD (gastroesophageal reflux disease)     Hypertension     Post-nasal drip     causing cough    Pure hypercholesterolemia 07/01/2019     Past Surgical History:   Procedure Laterality Date    CARDIAC CATHETERIZATION N/A 5/29/2024    Procedure: Cardiac catheterization;  Surgeon: Nilesh Mullen DO;  [facility];  Service: Cardiology    CARDIAC CATHETERIZATION N/A 5/29/2024    Procedure: Cardiac Coronary Angiogram;  Surgeon: Nilesh Mullen DO;  [facility];  Service: Cardiology    COLONOSCOPY  09/08/2017    5 yr recall    JOINT REPLACEMENT Right     hip    TIBIAL IM QUIRINO REMOVAL      TOTAL HIP ARTHROPLASTY      UPPER GASTROINTESTINAL ENDOSCOPY  10/18/2018    3 yr recall     Social History   Social History     Substance and Sexual  "Activity   Alcohol Use Never     Social History     Substance and Sexual Activity   Drug Use Never     E-Cigarette/Vaping    E-Cigarette Use Never User      E-Cigarette/Vaping Substances     Social History     Tobacco Use   Smoking Status Never    Passive exposure: Never   Smokeless Tobacco Never     Occupational History:     Family History:   Family History   Problem Relation Age of Onset    Colon polyps Sister     Colon cancer Neg Hx        Meds/Allergies   all current active meds have been reviewed    No Known Allergies    Objective   Vitals: Blood pressure (!) 103/46, pulse 59, temperature 98.3 °F (36.8 °C), resp. rate 20, height 5' 1.75\" (1.568 m), weight 57.4 kg (126 lb 8.7 oz), SpO2 94%.,Body mass index is 23.33 kg/m².    Intake/Output Summary (Last 24 hours) at 6/3/2024 0836  Last data filed at 6/3/2024 0600  Gross per 24 hour   Intake 260 ml   Output 300 ml   Net -40 ml     Invasive Devices       Peripheral Intravenous Line  Duration             Peripheral IV 05/31/24 Distal;Left;Upper;Ventral (anterior) Arm 3 days                    Physical Exam  Vitals and nursing note reviewed.   Constitutional:       General: She is not in acute distress.     Appearance: Normal appearance. She is well-developed. She is not ill-appearing.   HENT:      Head: Normocephalic and atraumatic.      Mouth/Throat:      Mouth: Mucous membranes are moist.   Eyes:      General: No scleral icterus.     Extraocular Movements: Extraocular movements intact.      Conjunctiva/sclera: Conjunctivae normal.      Pupils: Pupils are equal, round, and reactive to light.   Cardiovascular:      Rate and Rhythm: Normal rate and regular rhythm.      Pulses: Normal pulses.      Heart sounds: Normal heart sounds. No murmur heard.     No friction rub. No gallop.   Pulmonary:      Effort: Pulmonary effort is normal. No respiratory distress.      Breath sounds: Normal breath sounds. No wheezing or rales.   Abdominal:      General: There is no " distension.      Palpations: Abdomen is soft.      Tenderness: There is no abdominal tenderness. There is no guarding.   Musculoskeletal:         General: No swelling.      Cervical back: Neck supple.      Right lower leg: No edema.      Left lower leg: No edema.   Skin:     General: Skin is warm and dry.      Capillary Refill: Capillary refill takes less than 2 seconds.   Neurological:      Mental Status: She is alert and oriented to person, place, and time. Mental status is at baseline.   Psychiatric:         Mood and Affect: Mood normal.         Behavior: Behavior normal.         Lab Results: I have personally reviewed pertinent lab results.  Imaging Studies: I have personally reviewed pertinent reports.    EKG, Pathology, and Other Studies: I have personally reviewed pertinent reports.    VTE Prophylaxis: Reason for no pharmacologic prophylaxis      Code Status: Level 1 - Full Code  Advance Directive and Living Will:      Power of :    POLST:      None

## 2024-06-04 ENCOUNTER — NURSE TRIAGE (OUTPATIENT)
Age: 79
End: 2024-06-04

## 2024-06-04 NOTE — TELEPHONE ENCOUNTER
"Reason for Disposition   Swollen ankle joint  (Exception: area of localized swelling which is itchy)    Answer Assessment - Initial Assessment Questions  1. LOCATION: \"Which ankle is swollen?\" \"Where is the swelling?\"      Bilateral foot swelling, L > R  2. ONSET: \"When did the swelling start?\"      Today   3. SIZE: \"How large is the swelling?\"      Pitting edema   4. PAIN: \"Is there any pain?\" If Yes, ask: \"How bad is it?\" (Scale 1-10; or mild, moderate, severe)    - NONE (0): no pain.    - MILD (1-3): doesn't interfere with normal activities.     - MODERATE (4-7): interferes with normal activities (e.g., work or school) or awakens from sleep, limping.     - SEVERE (8-10): excruciating pain, unable to do any normal activities, unable to walk.       Denies   5. CAUSE: \"What do you think caused the ankle swelling?\"      Unsure   6. OTHER SYMPTOMS: \"Do you have any other symptoms?\" (e.g., fever, chest pain, difficulty breathing, calf pain)      Denies    Protocols used: Ankle Swelling-ADULT-AH    "

## 2024-06-04 NOTE — UTILIZATION REVIEW
NOTIFICATION OF ADMISSION DISCHARGE   This is a Notification of Discharge from [facility]. Please be advised that this patient has been discharge from our facility. Below you will find the admission and discharge date and time including the patient’s disposition.   UTILIZATION REVIEW CONTACT:  Naty Montgomery  Utilization   Network Utilization Review Department  Phone: [phone] carefully listen to the prompts. All voicemails are confidential.  Email: [email or web address]     ADMISSION INFORMATION  PRESENTATION DATE: 5/28/2024 12:03 AM  OBERVATION ADMISSION DATE:   INPATIENT ADMISSION DATE: 5/28/24 12:03 AM   DISCHARGE DATE: 6/3/2024  2:03 PM   DISPOSITION:Home with Home Health Care    Network Utilization Review Department  ATTENTION: Please call with any questions or concerns to [phone] and carefully listen to the prompts so that you are directed to the right person. All voicemails are confidential.   For Discharge needs, contact Care Management DC Support Team at [phone] opt. [phone]  Send all requests for admission clinical reviews, approved or denied determinations and any other requests to dedicated fax number below belonging to the campus where the patient is receiving treatment. List of dedicated fax numbers for the Facilities:  FACILITY NAME UR FAX NUMBER   ADMISSION DENIALS (Administrative/Medical Necessity) [phone]   DISCHARGE SUPPORT TEAM ([facility]) [phone]   PARENT CHILD HEALTH (Maternity/NICU/Pediatrics) [phone]   [facility] [phone]   [facility] [phone]   [facility] [phone]   [facility] [phone]   [facility] [phone]   [facility] [phone]   [facility] [phone]   [facility]  [phone]   [facility] [phone]   [facility] [phone]   [facility] [phone]   [facility] [phone]

## 2024-06-04 NOTE — TELEPHONE ENCOUNTER
Patient is s/p cardiac catheterization 5/29 with Dr. Mullen.  She is scheduled for visit tomorrow with Neil Samuel PA-C.    Patient calls today to notify that she started having bilateral feet and ankle swelling.  Pt denies chest pain, denies shortness of breath.  She states she needs to increase her hydration.  She states she has been elevating feet.    Advised pt to monitor and notify office for any worsening symptoms.    Please advise if any recommendations prior to visit tomorrow.

## 2024-06-04 NOTE — TELEPHONE ENCOUNTER
Talked to pt over the phone. Pt is fine for now, did advise if any chest pain or SOB were to follow then she should go to ER but as of now keep appointment for tomorrow to follow up with Neil Samuel PA-C.

## 2024-06-06 ENCOUNTER — OFFICE VISIT (OUTPATIENT)
Dept: CARDIOLOGY CLINIC | Facility: CLINIC | Age: 79
End: 2024-06-06
Payer: COMMERCIAL

## 2024-06-06 VITALS
WEIGHT: 129 LBS | SYSTOLIC BLOOD PRESSURE: 122 MMHG | DIASTOLIC BLOOD PRESSURE: 50 MMHG | HEART RATE: 50 BPM | BODY MASS INDEX: 23.74 KG/M2 | HEIGHT: 62 IN

## 2024-06-06 DIAGNOSIS — R79.89 ELEVATED TROPONIN LEVEL NOT DUE MYOCARDIAL INFARCTION: ICD-10-CM

## 2024-06-06 DIAGNOSIS — I47.29 NSVT (NONSUSTAINED VENTRICULAR TACHYCARDIA) (HCC): ICD-10-CM

## 2024-06-06 DIAGNOSIS — I42.8 NONISCHEMIC CARDIOMYOPATHY (HCC): Primary | ICD-10-CM

## 2024-06-06 DIAGNOSIS — I10 PRIMARY HYPERTENSION: ICD-10-CM

## 2024-06-06 DIAGNOSIS — I50.20 HFREF (HEART FAILURE WITH REDUCED EJECTION FRACTION) (HCC): ICD-10-CM

## 2024-06-06 DIAGNOSIS — I44.7 LBBB (LEFT BUNDLE BRANCH BLOCK): ICD-10-CM

## 2024-06-06 PROCEDURE — 99214 OFFICE O/P EST MOD 30 MIN: CPT | Performed by: INTERNAL MEDICINE

## 2024-06-06 NOTE — PATIENT INSTRUCTIONS
Low-Sodium Diet   AMBULATORY CARE:   A low-sodium diet  limits foods that are high in sodium (salt). You will need to follow a low-sodium diet if you have high blood pressure, kidney disease, or heart failure. You may also need to follow this diet if you have a condition that is causing your body to retain (hold) extra fluid. You may need to limit the amount of sodium you eat in a day to 1,500 to 2,000 mg. Ask your healthcare provider how much sodium you can have each day.  How to use food labels to choose foods that are low in sodium:  Read food labels to find the amount of sodium they contain. The amount of sodium is listed in milligrams (mg). The % Daily Value (DV) column tells you how much of your daily needs are met by 1 serving of the food for each nutrient listed. Choose foods that have less than 5% of the DV of sodium. These foods are considered low in sodium. Foods that have 20% or more of the DV of sodium are considered high in sodium. Some food labels may also list any of the following terms that tell you about the sodium content in the food:  Sodium-free:  Less than 5 mg in each serving    Very low sodium:  35 mg of sodium or less in each serving    Low sodium:  140 mg of sodium or less in each serving    Reduced sodium:  At least 25% less sodium in each serving than the regular type    Light in sodium:  50% less sodium in each serving    Unsalted or no added salt:  No extra salt is added during processing (the food may still contain sodium)       Foods to avoid:  Salty foods are high in sodium. You should avoid the following:  Processed foods:      Mixes for cornbread, biscuits, cake, and pudding     Instant foods, such as potatoes, cereals, noodles, and rice     Packaged foods, such as bread stuffing, rice and pasta mixes, snack dip mixes, and macaroni and cheese     Canned foods, such as canned vegetables, soups, broths, sauces, and vegetable or tomato juice    Snack foods, such as salted chips,  popcorn, pretzels, pork rinds, salted crackers, and salted nuts    Frozen foods, such as dinners, entrees, vegetables with sauces, and breaded meats    Sauerkraut, pickled vegetables, and other foods prepared in brine    Meats and cheeses:      Smoked or cured meat, such as corned beef, huitron, ham, hot dogs, and sausage    Canned meats or spreads, such as potted meats, sardines, anchovies, and imitation seafood    Deli or lunch meats, such as bologna, ham, turkey, and roast beef    Processed cheese, such as American cheese and cheese spreads    Condiments, sauces, and seasonings:      Salt (¼ teaspoon of salt contains 575 mg of sodium)    Seasonings made with salt, such as garlic salt, celery salt, onion salt, and seasoned salt    Regular soy sauce, barbecue sauce, teriyaki sauce, steak sauce, Worcestershire sauce, and most flavored vinegars    Canned gravy and mixes     Regular condiments, such as mustard, ketchup, and salad dressings    Pickles and olives    Meat tenderizers and monosodium glutamate (MSG)    Foods to include:  Read the food label to find the exact amount of sodium in each serving.  Bread and cereal:  Try to choose breads with less than 80 mg of sodium per serving.     Bread, roll, meron, tortilla, or unsalted crackers.    Ready-to-eat cereals with less than 5% DV of sodium (examples include shredded wheat and puffed rice)    Pasta    Vegetables and fruits:      Unsalted fresh, frozen, or canned vegetables    Fresh, frozen, or canned fruits    Fruit juice    Dairy:  One serving has about 150 mg of sodium.    Milk, all types    Yogurt    Hard cheese, such as cheddar, Swiss, [place]shear erlinda, or mozzarella    Meat and other protein foods:  Some raw meats may have added sodium.     Plain meats, fish, and poultry     Eggs    Other foods:      Homemade pudding    Unsalted nuts, popcorn, or pretzels    Unsalted butter or margarine    Ways to get less sodium:  If you are used to the flavor of salt, it will  take time to get used to low-sodium foods. Your healthcare provider or dietitian can help you create a plan for lowering sodium. The plan will be specific to your needs and your family's needs. You may focus on 1 or 2 changes each week, such as the following:  Add spices and herbs to foods instead of salt during cooking.  Use salt-free seasonings to add flavor to foods. Examples include onion powder, garlic powder, basil, allen powder, paprika, and parsley. Try lemon or lime juice or vinegar to give foods a tart flavor. Use hot peppers, pepper, or cayenne pepper to add a spicy flavor.    Do not keep a salt shaker at your kitchen table.  This may help keep you from adding salt to food at the table. A teaspoon of salt has 2,300 mg of sodium. It may take time to get used to enjoying the natural flavor of food instead of adding salt. Talk to your healthcare provider before you use salt substitutes. Some salt substitutes have a high amount of a chemical called potassium chloride. This form of potassium needs to be avoided if you have kidney disease.    Choose low-sodium foods at restaurants.  Meals from restaurants are often high in sodium. Some restaurants have nutrition information on the menu that tells you the amount of sodium in their foods. If possible, ask for your food to be prepared with less, or no salt.    Shop for unsalted or low-sodium foods and snacks at the grocery store.  Examples include unsalted or low-sodium broths, soups, and canned vegetables. Choose fresh or frozen vegetables instead. Choose unsalted nuts or seeds or fresh fruits or vegetables as snacks. Read food labels and choose salt-free, very low-sodium, or low-sodium foods. You can also rinse canned vegetables under running water to remove extra sodium before you cook them.    © Copyright [employer] 2023 Information is for End User's use only and may not be sold, redistributed or otherwise used for commercial purposes.  The above information is  an  only. It is not intended as medical advice for individual conditions or treatments. Talk to your doctor, nurse or pharmacist before following any medical regimen to see if it is safe and effective for you.

## 2024-06-06 NOTE — PROGRESS NOTES
Cardiology Follow Up    Noble Chou  1945  [id number]  [facility] [place]  [address]  [place] [id number] [phone]    1. Nonischemic cardiomyopathy (HCC)  Ambulatory  Referral to Cardiac Rehabilitation    Echo complete w/ contrast if indicated      2. HFrEF (heart failure with reduced ejection fraction) (HCC)  Ambulatory  Referral to Cardiac Rehabilitation    Echo complete w/ contrast if indicated      3. NSVT (nonsustained ventricular tachycardia) ([facility])        4. Primary hypertension        5. LBBB (left bundle branch block)        6. Elevated troponin level not due myocardial infarction            Discussion/Summary:    Nonischemic cardiomyopathy and chronic heart failure with reduced ejection fraction - Noble was found to have a dilated cardiomyopathy during a hospitalization for pneumonia.  This either represents a stress-induced cardiomyopathy or myocarditis.  She is on good goal-directed medical therapy with Toprol-XL and Entresto.  She is currently wearing a LifeVest.  She shows no signs of volume overload but does have some intermittent lower extremity edema.  If this worsens we will start diuretic therapy.  She is currently working with PT/OT and I did refer her to cardiac rehabilitation when she graduates out of home PT.  In 3 months we will repeat an echocardiogram and then have her in for close follow-up to discuss need for ICD.    Nonsustained VT - During her hospitalization she was found to have runs of nonsustained VT in which she was placed on amiodarone.  After next visit we will discuss coming off the amiodarone.        Interval History:     Mrs. Chou comes in for follow-up given her recent cardiac history and hospitalization.  Noble was hospitalized Memorial Day weekend with pneumonia.  Her symptoms included shortness of breath and worsening cough.  In the setting of this she was found to  have a significantly elevated troponin which led to a cardiac workup.  She had an echocardiogram that showed a dilated LV with severely to systolic function at 30%.  She had a cardiac catheterization that showed no significant obstructive CAD.  She has a known prior history of a chronic left bundle branch block in which she followed with Dr. Oglesby.  She does not recall ever having an echocardiogram as an outpatient during that time.    During her hospitalization she was also having runs of nonsustained VT.  Amiodarone was added and her goal-directed medical therapy including Toprol-XL 50 mg twice a day and Entresto 24/26 twice a day.  She is currently wearing a LifeVest.  She did have some issues with lower extremity edema but this has improved.    Noble continues to improve but is deconditioned and fatigued.  She does walk with a walker.  She does not show any signs of volume overload today.  She is going to be working with home PT/OT starting in the near future.  She denies any worsening shortness of breath but has not been doing much.  She denies chest pain or any symptoms of angina.  No orthopnea or PND.  She denies palpitations, lightheadedness or syncope.      Patient Active Problem List   Diagnosis    History of total right hip replacement    Right upper quadrant abdominal pain    Edmondson's esophagus without dysplasia    Constipation    Pure hypercholesterolemia    Gastroesophageal reflux disease with esophagitis without hemorrhage    History of colon polyps    LBBB (left bundle branch block)    Elevated troponin level not due myocardial infarction    Sepsis (HCC)    Pneumonia    Abnormal CT scan    Compression deformity of vertebra    Primary hypertension    NSVT (nonsustained ventricular tachycardia) ([facility])    Nonischemic cardiomyopathy (HCC)    HFrEF (heart failure with reduced ejection fraction) ([facility])     Past Medical History:   Diagnosis Date    Edmondson esophagus     Bundle-branch block     Cervical spine  fracture (HCC)     Colon polyp     GERD (gastroesophageal reflux disease)     Hypertension     Post-nasal drip     causing cough    Pure hypercholesterolemia 07/01/2019     Social History     Socioeconomic History    Marital status: /Civil Union     Spouse name: Not on file    Number of children: Not on file    Years of education: Not on file    Highest education level: Not on file   Occupational History    Not on file   Tobacco Use    Smoking status: Never     Passive exposure: Never    Smokeless tobacco: Never   Vaping Use    Vaping status: Never Used   Substance and Sexual Activity    Alcohol use: Never    Drug use: Never    Sexual activity: Not on file   Other Topics Concern    Not on file   Social History Narrative    Not on file     Social Determinants of Health     Financial Resource Strain: Not on file   Food Insecurity: No Food Insecurity (5/29/2024)    Hunger Vital Sign     Worried About Running Out of Food in the Last Year: Never true     Ran Out of Food in the Last Year: Never true   Transportation Needs: No Transportation Needs (5/29/2024)    PRAPARE - Transportation     Lack of Transportation (Medical): No     Lack of Transportation (Non-Medical): No   Physical Activity: Not on file   Stress: Not on file   Social Connections: Not on file   Intimate Partner Violence: Not on file   Housing Stability: Unknown (5/29/2024)    Housing Stability Vital Sign     Unable to Pay for Housing in the Last Year: No     Number of Times Moved in the Last Year: Not on file     Homeless in the Last Year: No      Family History   Problem Relation Age of Onset    Colon polyps Sister     Colon cancer Neg Hx      Past Surgical History:   Procedure Laterality Date    CARDIAC CATHETERIZATION N/A 5/29/2024    Procedure: Cardiac catheterization;  Surgeon: Nilesh Mullen DO;  [facility];  Service: Cardiology    CARDIAC CATHETERIZATION N/A 5/29/2024    Procedure: Cardiac Coronary Angiogram;   Surgeon: Nilesh Mullen DO;  [facility];  Service: Cardiology    COLONOSCOPY  09/08/2017    5 yr recall    JOINT REPLACEMENT Right     hip    TIBIAL IM QUIRINO REMOVAL      TOTAL HIP ARTHROPLASTY      UPPER GASTROINTESTINAL ENDOSCOPY  10/18/2018    3 yr recall       Current Outpatient Medications:     amiodarone 200 mg tablet, Take 1 tablet (200 mg total) by mouth 3 (three) times a day with meals for 4 days, THEN 1 tablet (200 mg total) 2 (two) times a day with meals for 7 days, THEN 1 tablet (200 mg total) daily with breakfast for 14 days., Disp: 40 tablet, Rfl: 0    aspirin 81 mg chewable tablet, Chew 1 tablet (81 mg total) daily, Disp: 30 tablet, Rfl: 0    co-enzyme Q-10 30 MG capsule, Take 30 mg by mouth 3 (three) times a day, Disp: , Rfl:     Cranberry 300 MG tablet, Take 300 mg by mouth 2 (two) times a day, Disp: , Rfl:     denosumab (PROLIA) 60 mg/mL, Inject 60 mg under the skin every 6 (six) months, Disp: , Rfl:     doxycycline hyclate (VIBRAMYCIN) 100 mg capsule, Take 1 capsule (100 mg total) by mouth every 12 (twelve) hours for 7 doses, Disp: 7 capsule, Rfl: 0    metoprolol succinate (TOPROL-XL) 50 mg 24 hr tablet, Take 1 tablet (50 mg total) by mouth every 12 (twelve) hours, Disp: 60 tablet, Rfl: 0    Multiple Vitamin (multivitamin) tablet, Take 1 tablet by mouth daily, Disp: , Rfl:     pantoprazole (PROTONIX) 40 mg tablet, TAKE 1 TABLET BY MOUTH EVERY DAY, Disp: 90 tablet, Rfl: 1    pantoprazole (PROTONIX) 40 mg tablet, Take 1 tablet (40 mg total) by mouth daily in the early morning, Disp: 30 tablet, Rfl: 0    pravastatin (PRAVACHOL) 40 mg tablet, Take 1 tablet (40 mg total) by mouth daily, Disp: 30 tablet, Rfl: 0    sacubitril-valsartan (ENTRESTO) 24-26 MG TABS, Take 1 tablet by mouth 2 (two) times a day, Disp: 60 tablet, Rfl: 0    TURMERIC PO, Take by mouth, Disp: , Rfl:     dextromethorphan-guaiFENesin (ROBITUSSIN DM)  mg/5 mL syrup, Take 10 mL by mouth every 4 (four) hours  as needed for cough (Patient not taking: Reported on 6/6/2024), Disp: 237 mL, Rfl: 0  No Known Allergies    Labs:  Lab Results   Component Value Date    K 4.2 06/02/2024    K 4.7 07/11/2019     (H) 06/02/2024     07/11/2019    CO2 17 (L) 06/02/2024    CO2 23 07/11/2019    BUN 25 06/02/2024    BUN 17 07/11/2019    CREATININE 0.74 06/02/2024    CALCIUM 8.0 (L) 06/02/2024     Lab Results   Component Value Date    WBC 14.97 (H) 06/02/2024    HGB 10.7 (L) 06/02/2024    HCT 33.0 (L) 06/02/2024    MCV 86 06/02/2024     (H) 06/02/2024     Lab Results   Component Value Date    TRIG 116 05/28/2024    HDL 49 (L) 05/28/2024     Imaging:  ECGs in the past have demonstrated sinus rhythm with a left bundle branch block.      Echo complete w/ contrast if indicated  Result Date: 5/28/2024  Narrative:   Left Ventricle: Left ventricular cavity size is normal. Wall thickness is mildly increased. There is mild concentric hypertrophy. The left ventricular ejection fraction is 30%. Systolic function is severely reduced. Diastolic function is mildly abnormal, consistent with grade I (abnormal) relaxation.   The following segments are akinetic: basal anteroseptal, mid anteroseptal, mid inferoseptal, mid inferior, apical anterior, apical septal, apical inferior, apical lateral and apex.   The following segments are hypokinetic: basal anterior, basal inferoseptal, basal inferior, basal inferolateral, basal anterolateral, mid anterior, mid inferolateral and mid anterolateral.   Right Ventricle: Right ventricular cavity size is normal. Systolic function is normal.   Left Atrium: The atrium is mildly dilated.   Aortic Valve: There is mild regurgitation.   Mitral Valve: There is mild annular calcification. There is mild regurgitation.   Tricuspid Valve: There is mild regurgitation. The right ventricular systolic pressure is mildly elevated. The estimated right ventricular systolic pressure is 39.00 mmHg.     PE Study with CT  abdomen & pelvis with contrast  Result Date: 5/27/2024  Narrative: CT PULMONARY ANGIOGRAM OF THE CHEST AND CT ABDOMEN AND PELVIS WITH INTRAVENOUS CONTRAST INDICATION: SOB, cough, elevated troponin. COMPARISON: None. TECHNIQUE: CT examination of the chest, abdomen and pelvis was performed. Thin section CT angiographic technique was used in the chest in order to evaluate for pulmonary embolus and coronal 3D MIP postprocessing was performed on the acquisition scanner. Multiplanar 2D reformatted images were created from the source data. This examination, like all CT scans performed in the [facility], was performed utilizing techniques to minimize radiation dose exposure, including the use of iterative reconstruction and automated exposure control. Radiation dose length product (DLP) for this visit: 705.06 mGy-cm IV Contrast: 85 mL of iohexol (OMNIPAQUE) Enteric Contrast: Not administered. FINDINGS: CHEST PULMONARY ARTERIAL TREE: No pulmonary embolus is seen. LUNGS: Large consolidation in the right lower lobe. Debris within the right lower lobe airways. Additional patchy opacities and small amount of endoluminal debris in the lingula and left lower lobe. PLEURA: Small right pleural effusion. HEART/AORTA: Heart is unremarkable for patient's age. No thoracic aortic aneurysm. MEDIASTINUM AND JERE: Mediastinal lymphadenopathy is likely reactive. CHEST WALL AND LOWER NECK: Unremarkable. ABDOMEN LIVER/BILIARY TREE: Unremarkable. GALLBLADDER: No calcified gallstones. No pericholecystic inflammatory change. SPLEEN: Unremarkable. PANCREAS: Unremarkable. ADRENAL GLANDS: Unremarkable. KIDNEYS/URETERS: 1.3 cm left renal lesion measuring above fluid density, indeterminate on single phase exam for hyperdense cyst versus solid neoplasm. No hydronephrosis. STOMACH AND BOWEL: Unremarkable. APPENDIX: No findings to suggest appendicitis. ABDOMINOPELVIC CAVITY: No ascites. No pneumoperitoneum. No lymphadenopathy. VESSELS:  Atherosclerosis without abdominal aortic aneurysm. PELVIS REPRODUCTIVE ORGANS: Abnormal appearance of the uterus or vaginal cuff. The uterus has a somewhat diminutive appearance which could be due to age-related atrophy versus the sequela of previous hysterectomy. But in either case there is a 2.4 x 2.1 cm soft  tissue density which is partially surrounded by a rim of fluid as demonstrated on series 3 image 129. This is difficult to interpret in the absence of comparison imaging as well as artifact in the pelvis from the right hip arthroplasty but the presence of a neoplasm cannot be excluded. URINARY BLADDER: Unremarkable. ABDOMINAL WALL/INGUINAL REGIONS: Unremarkable. BONES: Degenerative changes in the spine. Age-indeterminate L3 compression deformity. Grade 1 anterolisthesis of L5 on S1 secondary to bilateral L5 pars defects. Right total hip arthroplasty. Mild degenerative change at the left hip joint.     Impression: 1.  No pulmonary embolism. 2.  Large right lower lobe consolidation and patchy left-sided opacities with endoluminal debris, consistent with pneumonia. 3.  Retained debris in the esophagus. Given the lung opacities and endoluminal debris, aspiration should also be considered. 4.  Small right pleural effusion. 5.  Indeterminate 1.3 cm left renal lesion measuring above simple fluid density. Cannot distinguish hyperdense cyst from solid neoplasm on single phase CT. Recommend correlation with any prior outside imaging and otherwise characterization by nonemergent  renal protocol CT or MRI. 6.  Abnormal appearance of the uterus or vaginal cuff. A 2.4 cm soft tissue density in this region surrounded by partial rim of fluid is indeterminate but the presence of a neoplasm cannot be excluded. Correlation is needed with any available prior outside imaging. Recommend further evaluation with nonemergent pelvic ultrasound. 7.  Age-indeterminate L3 compression deformity. Correlate for focal pain. The study was  "marked in EPIC for immediate notification. Workstation performed: [id number]       Review of Systems:  Review of Systems   Constitutional:  Positive for fatigue.   HENT: Negative.     Eyes: Negative.    Respiratory: Negative.     Cardiovascular:  Positive for leg swelling.   Gastrointestinal: Negative.    Musculoskeletal:  Positive for gait problem.   Skin: Negative.    Allergic/Immunologic: Negative.    Hematological: Negative.    Psychiatric/Behavioral: Negative.     All other systems reviewed and are negative.    Vitals:    06/06/24 1128   BP: 122/50   BP Location: Left arm   Patient Position: Sitting   Cuff Size: Standard   Pulse: (!) 50   Weight: 58.5 kg (129 lb)   Height: 5' 1.75\" (1.568 m)     Physical Exam  Vitals and nursing note reviewed.   Constitutional:       Appearance: She is well-developed.   HENT:      Head: Normocephalic and atraumatic.   Eyes:      General: No scleral icterus.        Right eye: No discharge.         Left eye: No discharge.      Extraocular Movements: EOM normal.      Pupils: Pupils are equal, round, and reactive to light.   Neck:      Thyroid: No thyromegaly.      Vascular: No JVD.   Cardiovascular:      Rate and Rhythm: Normal rate and regular rhythm. No extrasystoles are present.     Pulses: Normal pulses. No decreased pulses.      Heart sounds: Normal heart sounds, S1 normal and S2 normal. No murmur heard.     No friction rub. No gallop.   Pulmonary:      Effort: Pulmonary effort is normal. No respiratory distress.      Breath sounds: Normal breath sounds. No wheezing, rhonchi or rales.   Abdominal:      General: Bowel sounds are normal. There is no distension.      Palpations: Abdomen is soft.      Tenderness: There is no abdominal tenderness.   Musculoskeletal:         General: No tenderness, deformity or edema. Normal range of motion.      Cervical back: Normal range of motion and neck supple.      Right lower leg: No edema.      Left lower leg: No edema.   Skin:     General: " Skin is warm and dry.      Findings: No rash.   Neurological:      Mental Status: She is alert and oriented to person, place, and time.      Cranial Nerves: No cranial nerve deficit.   Psychiatric:         Mood and Affect: Mood and affect normal.         Thought Content: Thought content normal.         Judgment: Judgment normal.       Counseling / Coordination of Care  Total office time spent today 25 minutes.  Greater than 50% of total time was spent with the patient and / or family counseling and / or coordination of care.

## 2024-06-11 ENCOUNTER — TELEPHONE (OUTPATIENT)
Age: 79
End: 2024-06-11

## 2024-06-11 NOTE — TELEPHONE ENCOUNTER
Received call from Melissa with [facility].  Patient's heart rate today after activity was 47-48.  /80.  HR prior to activity was 45.  Pt asymptomatic.    Yesterday heart rate readings checked by nurse were 60 and 89.  On 6/5 heart rate was 48.    Please advise.

## 2024-06-18 ENCOUNTER — TELEPHONE (OUTPATIENT)
Age: 79
End: 2024-06-18

## 2024-06-18 NOTE — TELEPHONE ENCOUNTER
Just a f/u from the call last week.   Received another call from physical therapist Michael with [facility] reporting a radial HR of 42, /57    [facility] reported HR yesterday of 45    Pt still remains asymptomatic    Pt is scheduled to f/u 9/12/24

## 2024-06-24 ENCOUNTER — TELEPHONE (OUTPATIENT)
Age: 79
End: 2024-06-24

## 2024-06-24 DIAGNOSIS — I42.8 NONISCHEMIC CARDIOMYOPATHY (HCC): ICD-10-CM

## 2024-06-24 DIAGNOSIS — I10 PRIMARY HYPERTENSION: ICD-10-CM

## 2024-06-24 NOTE — TELEPHONE ENCOUNTER
Medication: Metoprolol Succinate     Dose/Frequency: 50mg every 12hrs    Quantity: 60 R3    Pharmacy: [facility] [address]    Office:   [] PCP/Provider -   [x] Speciality/Provider - Dr Dejuan Ledesma    Does the patient have enough for 3 days?   [x] Yes   [] No - Send as HP to POD

## 2024-06-24 NOTE — TELEPHONE ENCOUNTER
Medication: Entresto     Dose/Frequency: 24-26mg tabs, 2x/day    Quantity: 60 R3    Pharmacy: [facility] [address]    Office:   [] PCP/Provider -   [x] Speciality/Provider - Dr Dejuan Ledesma    Does the patient have enough for 3 days?   [x] Yes   [] No - Send as HP to POD

## 2024-06-24 NOTE — TELEPHONE ENCOUNTER
Spoke with Myesha [facility] OT, regarding patient's heart rate resting 42, highest it reached with therapy, climbing staire was 55. Patient asymptomatic. Is currently wearing a life vest. Blood pressure 135/70.    Last 3 weeks therapists have called with same issue. Patient on Amiodorone 200mg daily, Metoprolol 50mg 2x/day, Entresto 24-26mg 2x/day.    Please advise.

## 2024-06-24 NOTE — TELEPHONE ENCOUNTER
Medication: Amiodarone    Dose/Frequency: 200mg daily    Quantity: 30 R3    Pharmacy: [facility] [address]    Office:   [] PCP/Provider -   [x] Speciality/Provider - Dr Dejuan Ledesma    Does the patient have enough for 3 days?   [x] Yes   [] No - Send as HP to POD

## 2024-06-25 RX ORDER — AMIODARONE HYDROCHLORIDE 200 MG/1
200 TABLET ORAL DAILY
Qty: 30 TABLET | Refills: 3 | Status: SHIPPED | OUTPATIENT
Start: 2024-06-25

## 2024-06-25 RX ORDER — METOPROLOL SUCCINATE 50 MG/1
50 TABLET, EXTENDED RELEASE ORAL EVERY 12 HOURS
Qty: 60 TABLET | Refills: 0 | Status: SHIPPED | OUTPATIENT
Start: 2024-06-25 | End: 2024-07-25

## 2024-06-25 NOTE — TELEPHONE ENCOUNTER
Requested medication(s) are due for refill today: Yes  Patient has already received a courtesy refill: No  Other reason request has been forwarded to provider: Last ordered by another provider

## 2024-06-25 NOTE — TELEPHONE ENCOUNTER
Received call from Ana, home health nurse with [facility].  She wanted to f/u on concerns of low HR.  Advised her of message from Dr. Ledesma.  She confirms pt remains asymptomatic.    She is asking if an order can be provided, if Dr. Ledesma would like to lower the parameter for HR that they need to notify office. Presently it is for HR less than 60 to call cardiology.  She is asking if parameter should be lowered to 40.  If appropriate, order will need to be faxed to [phone].    Please advise.

## 2024-06-26 PROBLEM — A41.9 SEPSIS (HCC): Status: RESOLVED | Noted: 2024-05-27 | Resolved: 2024-06-26

## 2024-06-27 NOTE — TELEPHONE ENCOUNTER
Received call from Alyce TOBAR from [facility] asking for an update on below messages. Informed her of message from Dr. Ledesma. She voiced understanding and stated she will inform the pt.

## 2024-06-29 PROBLEM — J18.9 PNEUMONIA: Status: RESOLVED | Noted: 2024-05-27 | Resolved: 2024-06-29

## 2024-07-11 ENCOUNTER — TELEPHONE (OUTPATIENT)
Age: 79
End: 2024-07-11

## 2024-07-11 NOTE — TELEPHONE ENCOUNTER
Spoke with Myesha OT home care with patient on speaker phone regarding some questions:    Patient will be done with home OT/PT the last week of July. Is she clear to start cardiac rehab the next week or should she wait?  Does Noble continue to wear the vest during cardiac rehab?  3.   Is Noble allowed to drive?    Please advise. Patient can be contacted at [phone]

## 2024-07-17 DIAGNOSIS — I42.8 NONISCHEMIC CARDIOMYOPATHY (HCC): ICD-10-CM

## 2024-07-18 RX ORDER — AMIODARONE HYDROCHLORIDE 200 MG/1
200 TABLET ORAL DAILY
Qty: 90 TABLET | Refills: 0 | Status: SHIPPED | OUTPATIENT
Start: 2024-07-18

## 2024-07-18 NOTE — TELEPHONE ENCOUNTER
Requested medication(s) are due for refill today: Yes  Patient has already received a courtesy refill: No  Other reason request has been forwarded to provider: off protocol

## 2024-07-21 DIAGNOSIS — I42.8 NONISCHEMIC CARDIOMYOPATHY (HCC): ICD-10-CM

## 2024-07-21 DIAGNOSIS — I10 PRIMARY HYPERTENSION: ICD-10-CM

## 2024-07-21 RX ORDER — SACUBITRIL AND VALSARTAN 24; 26 MG/1; MG/1
1 TABLET, FILM COATED ORAL 2 TIMES DAILY
Qty: 60 TABLET | Refills: 3 | Status: SHIPPED | OUTPATIENT
Start: 2024-07-21

## 2024-07-21 RX ORDER — METOPROLOL SUCCINATE 50 MG/1
50 TABLET, EXTENDED RELEASE ORAL EVERY 12 HOURS
Qty: 60 TABLET | Refills: 3 | Status: SHIPPED | OUTPATIENT
Start: 2024-07-21

## 2024-08-05 ENCOUNTER — CLINICAL SUPPORT (OUTPATIENT)
Dept: CARDIAC REHAB | Facility: HOSPITAL | Age: 79
End: 2024-08-05
Attending: INTERNAL MEDICINE
Payer: COMMERCIAL

## 2024-08-05 DIAGNOSIS — I42.8 NONISCHEMIC CARDIOMYOPATHY (HCC): ICD-10-CM

## 2024-08-05 DIAGNOSIS — I50.20 HFREF (HEART FAILURE WITH REDUCED EJECTION FRACTION) (HCC): Primary | ICD-10-CM

## 2024-08-05 PROCEDURE — 93797 PHYS/QHP OP CAR RHAB WO ECG: CPT
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
## 2024-08-07 ENCOUNTER — CLINICAL SUPPORT (OUTPATIENT)
Dept: CARDIAC REHAB | Facility: HOSPITAL | Age: 79
End: 2024-08-07
Attending: INTERNAL MEDICINE
Payer: COMMERCIAL

## 2024-08-07 DIAGNOSIS — I50.20 HFREF (HEART FAILURE WITH REDUCED EJECTION FRACTION) (HCC): Primary | ICD-10-CM

## 2024-08-07 PROCEDURE — 93798 PHYS/QHP OP CAR RHAB W/ECG: CPT

## 2024-08-09 ENCOUNTER — TELEPHONE (OUTPATIENT)
Dept: CARDIOLOGY CLINIC | Facility: CLINIC | Age: 79
End: 2024-08-09

## 2024-08-09 NOTE — TELEPHONE ENCOUNTER
Faxed [facility] patient's completed Medical Records request, LOV Notes, 5/28/25 Echo results, & 5/29/24 Cath results.  
bowel sounds hypoactive
bowel sounds hypoactive

## 2024-08-13 ENCOUNTER — CLINICAL SUPPORT (OUTPATIENT)
Dept: CARDIAC REHAB | Facility: HOSPITAL | Age: 79
End: 2024-08-13
Attending: INTERNAL MEDICINE
Payer: COMMERCIAL

## 2024-08-13 DIAGNOSIS — I50.20 HFREF (HEART FAILURE WITH REDUCED EJECTION FRACTION) (HCC): Primary | ICD-10-CM

## 2024-08-13 PROCEDURE — 93798 PHYS/QHP OP CAR RHAB W/ECG: CPT

## 2024-08-15 ENCOUNTER — CLINICAL SUPPORT (OUTPATIENT)
Dept: CARDIAC REHAB | Facility: HOSPITAL | Age: 79
End: 2024-08-15
Attending: INTERNAL MEDICINE
Payer: COMMERCIAL

## 2024-08-15 DIAGNOSIS — I10 PRIMARY HYPERTENSION: ICD-10-CM

## 2024-08-15 DIAGNOSIS — I50.20 HFREF (HEART FAILURE WITH REDUCED EJECTION FRACTION) (HCC): Primary | ICD-10-CM

## 2024-08-15 DIAGNOSIS — I42.8 NONISCHEMIC CARDIOMYOPATHY (HCC): ICD-10-CM

## 2024-08-15 PROCEDURE — 93798 PHYS/QHP OP CAR RHAB W/ECG: CPT

## 2024-08-15 RX ORDER — METOPROLOL SUCCINATE 50 MG/1
50 TABLET, EXTENDED RELEASE ORAL EVERY 12 HOURS
Qty: 180 TABLET | Refills: 1 | Status: SHIPPED | OUTPATIENT
Start: 2024-08-15

## 2024-08-16 ENCOUNTER — CLINICAL SUPPORT (OUTPATIENT)
Dept: CARDIAC REHAB | Facility: HOSPITAL | Age: 79
End: 2024-08-16
Attending: INTERNAL MEDICINE
Payer: COMMERCIAL

## 2024-08-16 DIAGNOSIS — I50.20 HFREF (HEART FAILURE WITH REDUCED EJECTION FRACTION) (HCC): Primary | ICD-10-CM

## 2024-08-16 PROCEDURE — 93798 PHYS/QHP OP CAR RHAB W/ECG: CPT

## 2024-08-20 ENCOUNTER — CLINICAL SUPPORT (OUTPATIENT)
Dept: CARDIAC REHAB | Facility: HOSPITAL | Age: 79
End: 2024-08-20
Attending: INTERNAL MEDICINE
Payer: COMMERCIAL

## 2024-08-20 DIAGNOSIS — I50.20 HFREF (HEART FAILURE WITH REDUCED EJECTION FRACTION) (HCC): Primary | ICD-10-CM

## 2024-08-20 PROCEDURE — 93798 PHYS/QHP OP CAR RHAB W/ECG: CPT

## 2024-08-22 ENCOUNTER — CLINICAL SUPPORT (OUTPATIENT)
Dept: CARDIAC REHAB | Facility: HOSPITAL | Age: 79
End: 2024-08-22
Attending: INTERNAL MEDICINE
Payer: COMMERCIAL

## 2024-08-22 DIAGNOSIS — I50.20 HFREF (HEART FAILURE WITH REDUCED EJECTION FRACTION) (HCC): Primary | ICD-10-CM

## 2024-08-22 PROCEDURE — 93798 PHYS/QHP OP CAR RHAB W/ECG: CPT

## 2024-08-23 ENCOUNTER — CLINICAL SUPPORT (OUTPATIENT)
Dept: CARDIAC REHAB | Facility: HOSPITAL | Age: 79
End: 2024-08-23
Attending: INTERNAL MEDICINE
Payer: COMMERCIAL

## 2024-08-23 DIAGNOSIS — I50.20 HFREF (HEART FAILURE WITH REDUCED EJECTION FRACTION) (HCC): Primary | ICD-10-CM

## 2024-08-23 PROCEDURE — 93798 PHYS/QHP OP CAR RHAB W/ECG: CPT

## 2024-08-27 ENCOUNTER — CLINICAL SUPPORT (OUTPATIENT)
Dept: CARDIAC REHAB | Facility: HOSPITAL | Age: 79
End: 2024-08-27
Attending: INTERNAL MEDICINE
Payer: COMMERCIAL

## 2024-08-27 DIAGNOSIS — I50.20 HFREF (HEART FAILURE WITH REDUCED EJECTION FRACTION) (HCC): Primary | ICD-10-CM

## 2024-08-27 PROCEDURE — 93798 PHYS/QHP OP CAR RHAB W/ECG: CPT

## 2024-08-28 ENCOUNTER — TELEPHONE (OUTPATIENT)
Dept: CARDIOLOGY CLINIC | Facility: CLINIC | Age: 79
End: 2024-08-28

## 2024-08-29 ENCOUNTER — CLINICAL SUPPORT (OUTPATIENT)
Dept: CARDIAC REHAB | Facility: HOSPITAL | Age: 79
End: 2024-08-29
Attending: INTERNAL MEDICINE
Payer: COMMERCIAL

## 2024-08-29 DIAGNOSIS — I50.20 HFREF (HEART FAILURE WITH REDUCED EJECTION FRACTION) (HCC): Primary | ICD-10-CM

## 2024-08-29 PROCEDURE — 93798 PHYS/QHP OP CAR RHAB W/ECG: CPT

## 2024-08-30 ENCOUNTER — CLINICAL SUPPORT (OUTPATIENT)
Dept: CARDIAC REHAB | Facility: HOSPITAL | Age: 79
End: 2024-08-30
Attending: INTERNAL MEDICINE
Payer: COMMERCIAL

## 2024-08-30 DIAGNOSIS — I50.20 HFREF (HEART FAILURE WITH REDUCED EJECTION FRACTION) (HCC): Primary | ICD-10-CM

## 2024-08-30 PROCEDURE — 93798 PHYS/QHP OP CAR RHAB W/ECG: CPT
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
## 2024-09-03 ENCOUNTER — CLINICAL SUPPORT (OUTPATIENT)
Dept: CARDIAC REHAB | Facility: HOSPITAL | Age: 79
End: 2024-09-03
Attending: INTERNAL MEDICINE
Payer: COMMERCIAL

## 2024-09-03 DIAGNOSIS — I50.20 HFREF (HEART FAILURE WITH REDUCED EJECTION FRACTION) (HCC): Primary | ICD-10-CM

## 2024-09-03 PROCEDURE — 93798 PHYS/QHP OP CAR RHAB W/ECG: CPT

## 2024-09-05 ENCOUNTER — CLINICAL SUPPORT (OUTPATIENT)
Dept: CARDIAC REHAB | Facility: HOSPITAL | Age: 79
End: 2024-09-05
Attending: INTERNAL MEDICINE
Payer: COMMERCIAL

## 2024-09-05 DIAGNOSIS — I50.20 HFREF (HEART FAILURE WITH REDUCED EJECTION FRACTION) (HCC): Primary | ICD-10-CM

## 2024-09-05 PROCEDURE — 93798 PHYS/QHP OP CAR RHAB W/ECG: CPT

## 2024-09-06 ENCOUNTER — CLINICAL SUPPORT (OUTPATIENT)
Dept: CARDIAC REHAB | Facility: HOSPITAL | Age: 79
End: 2024-09-06
Attending: INTERNAL MEDICINE
Payer: COMMERCIAL

## 2024-09-06 ENCOUNTER — HOSPITAL ENCOUNTER (OUTPATIENT)
Dept: NON INVASIVE DIAGNOSTICS | Age: 79
Discharge: HOME | End: 2024-09-06
Payer: COMMERCIAL

## 2024-09-06 VITALS
SYSTOLIC BLOOD PRESSURE: 122 MMHG | HEIGHT: 62 IN | WEIGHT: 129 LBS | HEART RATE: 54 BPM | BODY MASS INDEX: 23.74 KG/M2 | DIASTOLIC BLOOD PRESSURE: 50 MMHG

## 2024-09-06 DIAGNOSIS — I42.8 NONISCHEMIC CARDIOMYOPATHY (HCC): ICD-10-CM

## 2024-09-06 DIAGNOSIS — I50.20 HFREF (HEART FAILURE WITH REDUCED EJECTION FRACTION) (HCC): ICD-10-CM

## 2024-09-06 DIAGNOSIS — I50.20 HFREF (HEART FAILURE WITH REDUCED EJECTION FRACTION) (HCC): Primary | ICD-10-CM

## 2024-09-06 LAB
AORTIC ROOT: 2.9 CM
APICAL FOUR CHAMBER EJECTION FRACTION: 48 %
ASCENDING AORTA: 3.4 CM
BSA FOR ECHO PROCEDURE: 1.58 M2
E WAVE DECELERATION TIME: 143 MS
E/A RATIO: 1.14
FRACTIONAL SHORTENING: 15 (ref 28–44)
INTERVENTRICULAR SEPTUM IN DIASTOLE (PARASTERNAL SHORT AXIS VIEW): 1.2 CM
INTERVENTRICULAR SEPTUM: 1.2 CM (ref 0.6–1.1)
LAAS-AP2: 19 CM2
LAAS-AP4: 18.4 CM2
LEFT ATRIUM SIZE: 3.9 CM
LEFT ATRIUM VOLUME (MOD BIPLANE): 56 ML
LEFT ATRIUM VOLUME INDEX (MOD BIPLANE): 35.4 ML/M2
LEFT INTERNAL DIMENSION IN SYSTOLE: 4 CM (ref 2.1–4)
LEFT VENTRICLE DIASTOLIC VOLUME (MOD BIPLANE): 106 ML
LEFT VENTRICLE DIASTOLIC VOLUME INDEX (MOD BIPLANE): 67.1 ML/M2
LEFT VENTRICLE SYSTOLIC VOLUME (MOD BIPLANE): 52 ML
LEFT VENTRICLE SYSTOLIC VOLUME INDEX (MOD BIPLANE): 32.9 ML/M2
LEFT VENTRICULAR INTERNAL DIMENSION IN DIASTOLE: 4.7 CM (ref 3.5–6)
LEFT VENTRICULAR POSTERIOR WALL IN END DIASTOLE: 0.9 CM
LEFT VENTRICULAR STROKE VOLUME: 29 ML
LV EF: 51 %
LVSV (TEICH): 29 ML
MV E'TISSUE VEL-LAT: 14 CM/S
MV E'TISSUE VEL-SEP: 6 CM/S
MV PEAK A VEL: 0.73 M/S
MV PEAK E VEL: 83 CM/S
MV STENOSIS PRESSURE HALF TIME: 41 MS
MV VALVE AREA P 1/2 METHOD: 5.37
RIGHT ATRIUM AREA SYSTOLE A4C: 12.2 CM2
RIGHT VENTRICLE ID DIMENSION: 3 CM
SL CV LEFT ATRIUM LENGTH A2C: 4.9 CM
SL CV LV EF: 30
SL CV PED ECHO LEFT VENTRICLE DIASTOLIC VOLUME (MOD BIPLANE) 2D: 101 ML
SL CV PED ECHO LEFT VENTRICLE SYSTOLIC VOLUME (MOD BIPLANE) 2D: 72 ML
TR MAX PG: 31 MMHG
TR PEAK VELOCITY: 2.8 M/S
TRICUSPID ANNULAR PLANE SYSTOLIC EXCURSION: 2.2 CM
TRICUSPID VALVE PEAK REGURGITATION VELOCITY: 2.77 M/S

## 2024-09-06 PROCEDURE — 93306 TTE W/DOPPLER COMPLETE: CPT | Performed by: INTERNAL MEDICINE

## 2024-09-06 PROCEDURE — C8929 TTE W OR WO FOL WCON,DOPPLER: HCPCS

## 2024-09-06 PROCEDURE — 93798 PHYS/QHP OP CAR RHAB W/ECG: CPT

## 2024-09-06 RX ADMIN — PERFLUTREN 0.6 ML/MIN: 6.52 INJECTION, SUSPENSION INTRAVENOUS at 12:12

## 2024-09-10 ENCOUNTER — CLINICAL SUPPORT (OUTPATIENT)
Dept: CARDIAC REHAB | Facility: HOSPITAL | Age: 79
End: 2024-09-10
Attending: INTERNAL MEDICINE
Payer: COMMERCIAL

## 2024-09-10 DIAGNOSIS — I50.20 HFREF (HEART FAILURE WITH REDUCED EJECTION FRACTION) (HCC): Primary | ICD-10-CM

## 2024-09-10 PROCEDURE — 93798 PHYS/QHP OP CAR RHAB W/ECG: CPT

## 2024-09-12 ENCOUNTER — OFFICE VISIT (OUTPATIENT)
Dept: CARDIOLOGY CLINIC | Facility: CLINIC | Age: 79
End: 2024-09-12
Payer: COMMERCIAL

## 2024-09-12 ENCOUNTER — CLINICAL SUPPORT (OUTPATIENT)
Dept: CARDIAC REHAB | Facility: HOSPITAL | Age: 79
End: 2024-09-12
Attending: INTERNAL MEDICINE
Payer: COMMERCIAL

## 2024-09-12 VITALS
DIASTOLIC BLOOD PRESSURE: 70 MMHG | HEART RATE: 47 BPM | HEIGHT: 62 IN | BODY MASS INDEX: 23.74 KG/M2 | SYSTOLIC BLOOD PRESSURE: 130 MMHG | WEIGHT: 129 LBS

## 2024-09-12 DIAGNOSIS — I50.20 HFREF (HEART FAILURE WITH REDUCED EJECTION FRACTION) (HCC): Primary | ICD-10-CM

## 2024-09-12 DIAGNOSIS — I44.7 LBBB (LEFT BUNDLE BRANCH BLOCK): ICD-10-CM

## 2024-09-12 DIAGNOSIS — I50.20 HFREF (HEART FAILURE WITH REDUCED EJECTION FRACTION) (HCC): ICD-10-CM

## 2024-09-12 DIAGNOSIS — I47.29 NSVT (NONSUSTAINED VENTRICULAR TACHYCARDIA) (HCC): ICD-10-CM

## 2024-09-12 DIAGNOSIS — I42.8 NONISCHEMIC CARDIOMYOPATHY (HCC): Primary | ICD-10-CM

## 2024-09-12 PROCEDURE — 93798 PHYS/QHP OP CAR RHAB W/ECG: CPT

## 2024-09-12 PROCEDURE — 99214 OFFICE O/P EST MOD 30 MIN: CPT | Performed by: INTERNAL MEDICINE

## 2024-09-12 NOTE — PROGRESS NOTES
Cardiology Follow Up    Noble Chou  1945  [id number]  [facility] [place] [id number]  [facility] [address]  [facility] [place] [id number] [phone]    1. Nonischemic cardiomyopathy (HCC)  Ambulatory referral to Cardiac Electrophysiology    Ambulatory referral to Cardiac Electrophysiology      2. HFrEF (heart failure with reduced ejection fraction) ([facility])  Ambulatory referral to Cardiac Electrophysiology      3. LBBB (left bundle branch block)  Ambulatory referral to Cardiac Electrophysiology    Ambulatory referral to Cardiac Electrophysiology      4. NSVT (nonsustained ventricular tachycardia) ([facility])  Ambulatory referral to Cardiac Electrophysiology          Discussion/Summary:    Nonischemic cardiomyopathy and chronic heart failure with reduced ejection fraction - Noble was found to have a dilated cardiomyopathy during a hospitalization for pneumonia which was worked up once she was found to have a significantly elevated troponin level.  At first this appeared either stress-induced or myocarditis.  This now appears to be myocarditis with a persistently reduced ejection fraction.  Echocardiogram last week not showing any significant change.  I am going to refer her to electrophysiology for an ICD.  She is a stage C and now appears to be in NYHA class I, as she is not conveying any symptoms to me to the level she achieves.  She continues to work in cardiac rehabilitation.  Even though she is asymptomatic, I do feel that at higher levels of exertion she would become symptomatic, and therefore I would consider a biventricular ICD given her left bundle branch block.  I will defer this to electrophysiology.  She will remain on the same dose of Entresto and Toprol-XL.  She will be back to see us in 6 months.    Nonsustained VT - During her hospitalization she was found to have runs of nonsustained VT in which she was placed on amiodarone.  I do not  feel she needs to continue on this but we will leave it in place until she receives her ICD and I will allow electrophysiology to also comment on this.      Interval History:     Mrs. Chou comes in for follow-up given her recent cardiac history and hospitalization.  Noble was hospitalized Memorial Day weekend with pneumonia.  Her symptoms included shortness of breath and worsening cough.  In the setting of this she was found to have a significantly elevated troponin which led to a cardiac workup.  She had an echocardiogram that showed a dilated LV with severely to systolic function at 30%.  She had a cardiac catheterization that showed no significant obstructive CAD.  She has a known prior history of a chronic left bundle branch block in which she followed with Dr. Oglesby.  She does not recall ever having an echocardiogram as an outpatient during that time.    During her hospitalization she was also having runs of nonsustained VT.  Amiodarone was added and her goal-directed medical therapy including Toprol-XL 50 mg twice a day and Entresto 24/26 twice a day.  She is currently wearing a LifeVest.  We felt that the time that the cardiomyopathy was either stress-induced or myocarditis.  She is back today for a 3-month follow-up and just prior to this visit she had an echocardiogram that shows the same reduction in ejection fraction at 30%.  Her echocardiogram has not significantly changed.    Noble has clinically improved since I last saw her.  At our last appointment she was still very fatigued and deconditioned from her hospitalization.  She was battling some on and off lower extremity edema but all of this has improved.  She has gone through PT/OT and is currently doing cardiac rehabilitation.  To the level she achieves she does not convey any symptoms to me.  She denies shortness of breath, orthopnea or PND.  No edema at this point.  She denies chest pain or any symptoms of angina.  No palpitations, lightheadedness  or syncope.      Patient Active Problem List   Diagnosis    History of total right hip replacement    Right upper quadrant abdominal pain    Edmondson's esophagus without dysplasia    Constipation    Pure hypercholesterolemia    Gastroesophageal reflux disease with esophagitis without hemorrhage    History of colon polyps    LBBB (left bundle branch block)    Elevated troponin level not due myocardial infarction    Abnormal CT scan    Compression deformity of vertebra    Primary hypertension    NSVT (nonsustained ventricular tachycardia) (HCC)    Nonischemic cardiomyopathy (HCC)    HFrEF (heart failure with reduced ejection fraction) (HCC)     Past Medical History:   Diagnosis Date    Edmondson esophagus     Bundle-branch block     Cervical spine fracture (HCC)     Colon polyp     GERD (gastroesophageal reflux disease)     Hypertension     Post-nasal drip     causing cough    Pure hypercholesterolemia 07/01/2019     Social History     Socioeconomic History    Marital status: /Civil Union     Spouse name: Not on file    Number of children: Not on file    Years of education: Not on file    Highest education level: Not on file   Occupational History    Not on file   Tobacco Use    Smoking status: Never     Passive exposure: Never    Smokeless tobacco: Never   Vaping Use    Vaping status: Never Used   Substance and Sexual Activity    Alcohol use: Never    Drug use: Never    Sexual activity: Not on file   Other Topics Concern    Not on file   Social History Narrative    Not on file     Social Determinants of Health     Financial Resource Strain: Not on file   Food Insecurity: No Food Insecurity (5/29/2024)    Hunger Vital Sign     Worried About Running Out of Food in the Last Year: Never true     Ran Out of Food in the Last Year: Never true   Transportation Needs: No Transportation Needs (5/29/2024)    PRAPARE - Transportation     Lack of Transportation (Medical): No     Lack of Transportation (Non-Medical): No    Physical Activity: Not on file   Stress: Not on file   Social Connections: Not on file   Intimate Partner Violence: Not on file   Housing Stability: Unknown (5/29/2024)    Housing Stability Vital Sign     Unable to Pay for Housing in the Last Year: No     Number of Times Moved in the Last Year: Not on file     Homeless in the Last Year: No      Family History   Problem Relation Age of Onset    Colon polyps Sister     Colon cancer Neg Hx      Past Surgical History:   Procedure Laterality Date    CARDIAC CATHETERIZATION N/A 5/29/2024    Procedure: Cardiac catheterization;  Surgeon: Nilesh Mullen DO;  Location: [facility];  Service: Cardiology    CARDIAC CATHETERIZATION N/A 5/29/2024    Procedure: Cardiac Coronary Angiogram;  Surgeon: Nilesh Mullen DO;  [facility];  Service: Cardiology    COLONOSCOPY  09/08/2017    5 yr recall    JOINT REPLACEMENT Right     hip    TIBIAL IM QUIRINO REMOVAL      TOTAL HIP ARTHROPLASTY      UPPER GASTROINTESTINAL ENDOSCOPY  10/18/2018    3 yr recall       Current Outpatient Medications:     amiodarone 200 mg tablet, TAKE 1 TABLET BY MOUTH EVERY DAY, Disp: 90 tablet, Rfl: 0    aspirin 81 mg chewable tablet, Chew 1 tablet (81 mg total) daily, Disp: 30 tablet, Rfl: 0    co-enzyme Q-10 30 MG capsule, Take 30 mg by mouth 3 (three) times a day, Disp: , Rfl:     Cranberry 300 MG tablet, Take 300 mg by mouth 2 (two) times a day, Disp: , Rfl:     denosumab (PROLIA) 60 mg/mL, Inject 60 mg under the skin every 6 (six) months, Disp: , Rfl:     metoprolol succinate (TOPROL-XL) 50 mg 24 hr tablet, TAKE 1 TABLET BY MOUTH EVERY 12 HOURS., Disp: 180 tablet, Rfl: 1    Multiple Vitamin (multivitamin) tablet, Take 1 tablet by mouth daily, Disp: , Rfl:     pantoprazole (PROTONIX) 40 mg tablet, TAKE 1 TABLET BY MOUTH EVERY DAY, Disp: 90 tablet, Rfl: 1    pantoprazole (PROTONIX) 40 mg tablet, Take 1 tablet (40 mg total) by mouth daily in the early morning, Disp: 30  tablet, Rfl: 0    pravastatin (PRAVACHOL) 40 mg tablet, Take 1 tablet (40 mg total) by mouth daily, Disp: 30 tablet, Rfl: 0    sacubitril-valsartan (Entresto) 24-26 MG TABS, TAKE 1 TABLET BY MOUTH TWICE A DAY, Disp: 60 tablet, Rfl: 3    TURMERIC PO, Take by mouth, Disp: , Rfl:   No Known Allergies    Labs:  Lab Results   Component Value Date    K 4.2 06/02/2024    K 4.7 07/11/2019     (H) 06/02/2024     07/11/2019    CO2 17 (L) 06/02/2024    CO2 23 07/11/2019    BUN 25 06/02/2024    BUN 17 07/11/2019    CREATININE 0.74 06/02/2024    CALCIUM 8.0 (L) 06/02/2024     Lab Results   Component Value Date    WBC 14.97 (H) 06/02/2024    HGB 10.7 (L) 06/02/2024    HCT 33.0 (L) 06/02/2024    MCV 86 06/02/2024     (H) 06/02/2024     Lab Results   Component Value Date    TRIG 116 05/28/2024    HDL 49 (L) 05/28/2024     Imaging:  ECGs in the past have demonstrated sinus rhythm with a left bundle branch block.    ECHO (9/6/2024):    Left Ventricle: Left ventricular cavity size is mildly dilated. Wall thickness is mildly increased. The left ventricular ejection fraction is 30%. Systolic function is severely reduced. There is severe global hypokinesis with regional variation. Diastolic function is moderately abnormal, consistent with grade II (pseudonormal) relaxation.    Right Ventricle: Right ventricular cavity size is normal. Systolic function is normal.    Left Atrium: The atrium is mildly dilated.    Aortic Valve: There is mild regurgitation.    Mitral Valve: There is mild annular calcification. There is moderate regurgitation.    Tricuspid Valve: There is mild regurgitation. The right ventricular systolic pressure is mildly elevated.      Echo complete w/ contrast if indicated  Result Date: 5/28/2024  Narrative:   Left Ventricle: Left ventricular cavity size is normal. Wall thickness is mildly increased. There is mild concentric hypertrophy. The left ventricular ejection fraction is 30%. Systolic function is  severely reduced. Diastolic function is mildly abnormal, consistent with grade I (abnormal) relaxation.   The following segments are akinetic: basal anteroseptal, mid anteroseptal, mid inferoseptal, mid inferior, apical anterior, apical septal, apical inferior, apical lateral and apex.   The following segments are hypokinetic: basal anterior, basal inferoseptal, basal inferior, basal inferolateral, basal anterolateral, mid anterior, mid inferolateral and mid anterolateral.   Right Ventricle: Right ventricular cavity size is normal. Systolic function is normal.   Left Atrium: The atrium is mildly dilated.   Aortic Valve: There is mild regurgitation.   Mitral Valve: There is mild annular calcification. There is mild regurgitation.   Tricuspid Valve: There is mild regurgitation. The right ventricular systolic pressure is mildly elevated. The estimated right ventricular systolic pressure is 39.00 mmHg.     PE Study with CT abdomen & pelvis with contrast  Result Date: 5/27/2024  Narrative: CT PULMONARY ANGIOGRAM OF THE CHEST AND CT ABDOMEN AND PELVIS WITH INTRAVENOUS CONTRAST INDICATION: SOB, cough, elevated troponin. COMPARISON: None. TECHNIQUE: CT examination of the chest, abdomen and pelvis was performed. Thin section CT angiographic technique was used in the chest in order to evaluate for pulmonary embolus and coronal 3D MIP postprocessing was performed on the acquisition scanner. Multiplanar 2D reformatted images were created from the source data. This examination, like all CT scans performed in the [facility], was performed utilizing techniques to minimize radiation dose exposure, including the use of iterative reconstruction and automated exposure control. Radiation dose length product (DLP) for this visit: 705.06 mGy-cm IV Contrast: 85 mL of iohexol (OMNIPAQUE) Enteric Contrast: Not administered. FINDINGS: CHEST PULMONARY ARTERIAL TREE: No pulmonary embolus is seen. LUNGS: Large consolidation in  the right lower lobe. Debris within the right lower lobe airways. Additional patchy opacities and small amount of endoluminal debris in the lingula and left lower lobe. PLEURA: Small right pleural effusion. HEART/AORTA: Heart is unremarkable for patient's age. No thoracic aortic aneurysm. MEDIASTINUM AND JERE: Mediastinal lymphadenopathy is likely reactive. CHEST WALL AND LOWER NECK: Unremarkable. ABDOMEN LIVER/BILIARY TREE: Unremarkable. GALLBLADDER: No calcified gallstones. No pericholecystic inflammatory change. SPLEEN: Unremarkable. PANCREAS: Unremarkable. ADRENAL GLANDS: Unremarkable. KIDNEYS/URETERS: 1.3 cm left renal lesion measuring above fluid density, indeterminate on single phase exam for hyperdense cyst versus solid neoplasm. No hydronephrosis. STOMACH AND BOWEL: Unremarkable. APPENDIX: No findings to suggest appendicitis. ABDOMINOPELVIC CAVITY: No ascites. No pneumoperitoneum. No lymphadenopathy. VESSELS: Atherosclerosis without abdominal aortic aneurysm. PELVIS REPRODUCTIVE ORGANS: Abnormal appearance of the uterus or vaginal cuff. The uterus has a somewhat diminutive appearance which could be due to age-related atrophy versus the sequela of previous hysterectomy. But in either case there is a 2.4 x 2.1 cm soft  tissue density which is partially surrounded by a rim of fluid as demonstrated on series 3 image 129. This is difficult to interpret in the absence of comparison imaging as well as artifact in the pelvis from the right hip arthroplasty but the presence of a neoplasm cannot be excluded. URINARY BLADDER: Unremarkable. ABDOMINAL WALL/INGUINAL REGIONS: Unremarkable. BONES: Degenerative changes in the spine. Age-indeterminate L3 compression deformity. Grade 1 anterolisthesis of L5 on S1 secondary to bilateral L5 pars defects. Right total hip arthroplasty. Mild degenerative change at the left hip joint.     Impression: 1.  No pulmonary embolism. 2.  Large right lower lobe consolidation and patchy  "left-sided opacities with endoluminal debris, consistent with pneumonia. 3.  Retained debris in the esophagus. Given the lung opacities and endoluminal debris, aspiration should also be considered. 4.  Small right pleural effusion. 5.  Indeterminate 1.3 cm left renal lesion measuring above simple fluid density. Cannot distinguish hyperdense cyst from solid neoplasm on single phase CT. Recommend correlation with any prior outside imaging and otherwise characterization by nonemergent  renal protocol CT or MRI. 6.  Abnormal appearance of the uterus or vaginal cuff. A 2.4 cm soft tissue density in this region surrounded by partial rim of fluid is indeterminate but the presence of a neoplasm cannot be excluded. Correlation is needed with any available prior outside imaging. Recommend further evaluation with nonemergent pelvic ultrasound. 7.  Age-indeterminate L3 compression deformity. Correlate for focal pain. The study was marked in EPIC for immediate notification. Workstation performed: [id number]       Review of Systems:  Review of Systems   Constitutional:  Positive for fatigue.   HENT: Negative.     Eyes: Negative.    Respiratory: Negative.     Cardiovascular:  Positive for leg swelling.   Gastrointestinal: Negative.    Musculoskeletal:  Positive for gait problem.   Skin: Negative.    Allergic/Immunologic: Negative.    Hematological: Negative.    Psychiatric/Behavioral: Negative.     All other systems reviewed and are negative.    Vitals:    09/12/24 1302   BP: 130/70   BP Location: Left arm   Patient Position: Sitting   Cuff Size: Standard   Pulse: (!) 47   Weight: 58.5 kg (129 lb)   Height: 5' 2\" (1.575 m)     Physical Exam  Vitals and nursing note reviewed.   Constitutional:       Appearance: She is well-developed.   HENT:      Head: Normocephalic and atraumatic.   Eyes:      General: No scleral icterus.        Right eye: No discharge.         Left eye: No discharge.      Pupils: Pupils are equal, round, and " reactive to light.   Neck:      Thyroid: No thyromegaly.      Vascular: No JVD.   Cardiovascular:      Rate and Rhythm: Normal rate and regular rhythm. No extrasystoles are present.     Pulses: Normal pulses. No decreased pulses.      Heart sounds: Normal heart sounds, S1 normal and S2 normal. No murmur heard.     No friction rub. No gallop.   Pulmonary:      Effort: Pulmonary effort is normal. No respiratory distress.      Breath sounds: Normal breath sounds. No wheezing, rhonchi or rales.   Abdominal:      General: Bowel sounds are normal. There is no distension.      Palpations: Abdomen is soft.      Tenderness: There is no abdominal tenderness.   Musculoskeletal:         General: No tenderness or deformity. Normal range of motion.      Cervical back: Normal range of motion and neck supple.      Right lower leg: No edema.      Left lower leg: No edema.   Skin:     General: Skin is warm and dry.      Findings: No rash.   Neurological:      Mental Status: She is alert and oriented to person, place, and time.      Cranial Nerves: No cranial nerve deficit.   Psychiatric:         Thought Content: Thought content normal.         Judgment: Judgment normal.       Counseling / Coordination of Care  Total office time spent today 25 minutes.  Greater than 50% of total time was spent with the patient and / or family counseling and / or coordination of care.

## 2024-09-13 ENCOUNTER — CLINICAL SUPPORT (OUTPATIENT)
Dept: CARDIAC REHAB | Facility: HOSPITAL | Age: 79
End: 2024-09-13
Attending: INTERNAL MEDICINE
Payer: COMMERCIAL

## 2024-09-13 DIAGNOSIS — I50.20 HFREF (HEART FAILURE WITH REDUCED EJECTION FRACTION) (HCC): Primary | ICD-10-CM

## 2024-09-13 PROCEDURE — 93798 PHYS/QHP OP CAR RHAB W/ECG: CPT

## 2024-09-17 ENCOUNTER — CLINICAL SUPPORT (OUTPATIENT)
Dept: CARDIAC REHAB | Facility: HOSPITAL | Age: 79
End: 2024-09-17
Attending: INTERNAL MEDICINE
Payer: COMMERCIAL

## 2024-09-17 ENCOUNTER — PREP FOR PROCEDURE (OUTPATIENT)
Dept: CARDIOLOGY CLINIC | Facility: CLINIC | Age: 79
End: 2024-09-17

## 2024-09-17 ENCOUNTER — TELEPHONE (OUTPATIENT)
Dept: CARDIOLOGY CLINIC | Facility: CLINIC | Age: 79
End: 2024-09-17

## 2024-09-17 ENCOUNTER — OFFICE VISIT (OUTPATIENT)
Dept: CARDIOLOGY CLINIC | Facility: CLINIC | Age: 79
End: 2024-09-17
Payer: COMMERCIAL

## 2024-09-17 VITALS
HEART RATE: 51 BPM | BODY MASS INDEX: 23.41 KG/M2 | DIASTOLIC BLOOD PRESSURE: 64 MMHG | WEIGHT: 127.2 LBS | SYSTOLIC BLOOD PRESSURE: 150 MMHG | HEIGHT: 62 IN

## 2024-09-17 DIAGNOSIS — I42.8 NONISCHEMIC CARDIOMYOPATHY (HCC): ICD-10-CM

## 2024-09-17 DIAGNOSIS — I42.8 NONISCHEMIC CARDIOMYOPATHY (HCC): Primary | ICD-10-CM

## 2024-09-17 DIAGNOSIS — I50.20 HFREF (HEART FAILURE WITH REDUCED EJECTION FRACTION) (HCC): Primary | ICD-10-CM

## 2024-09-17 DIAGNOSIS — I44.7 LBBB (LEFT BUNDLE BRANCH BLOCK): ICD-10-CM

## 2024-09-17 DIAGNOSIS — I44.7 LBBB (LEFT BUNDLE BRANCH BLOCK): Primary | ICD-10-CM

## 2024-09-17 DIAGNOSIS — I10 PRIMARY HYPERTENSION: ICD-10-CM

## 2024-09-17 DIAGNOSIS — I50.20 HFREF (HEART FAILURE WITH REDUCED EJECTION FRACTION) (HCC): ICD-10-CM

## 2024-09-17 DIAGNOSIS — E78.00 PURE HYPERCHOLESTEROLEMIA: ICD-10-CM

## 2024-09-17 PROCEDURE — 93798 PHYS/QHP OP CAR RHAB W/ECG: CPT

## 2024-09-17 PROCEDURE — 99214 OFFICE O/P EST MOD 30 MIN: CPT | Performed by: STUDENT IN AN ORGANIZED HEALTH CARE EDUCATION/TRAINING PROGRAM

## 2024-09-17 PROCEDURE — 93000 ELECTROCARDIOGRAM COMPLETE: CPT | Performed by: STUDENT IN AN ORGANIZED HEALTH CARE EDUCATION/TRAINING PROGRAM

## 2024-09-17 NOTE — H&P (VIEW-ONLY)
HEART AND VASCULAR  CARDIAC ELECTROPHYSIOLOGY   HEART RHYTHM CENTER  [facility]    Outpatient New Consult for evaluation for ICD implant   Today's Date: 09/17/24        Patient name: Noble Chou  YOB: 1945  Sex: female         Chief Complaint: as above      ASSESSMENT:  Problem List Items Addressed This Visit       Pure hypercholesterolemia    LBBB (left bundle branch block)    Primary hypertension    Nonischemic cardiomyopathy (HCC) - Primary    Relevant Orders    POCT ECG (Completed)    HFrEF (heart failure with reduced ejection fraction) (HCC)             PLAN:  Nonischemic cardiomyopathy, EF less than 30 despite GDMT  -Continue aspirin 81 mg daily  -Continue metoprolol succinate 50 mg every 12 hours  -Continue Entresto 24-26 mg twice daily  -Plan for dual-chamber ICD as patient is functional class NYHA class I    History of NSVT  -At time of initial diagnosis was having NSVT  -Placed on amiodarone  -Now on good dose of beta-blocker  -Will discontinue amiodarone once dual chamber ICD is implanted    Hypercholesterolemia  -Continue pravastatin 40 mg daily    Primary hypertension  -Continue metoprolol succinate 50 mg every 12 hours  -Continue Entresto 24-26 mg tablets twice daily      Follow up in: 3 months    Orders Placed This Encounter   Procedures    POCT ECG     Medications Discontinued During This Encounter   Medication Reason    pantoprazole (PROTONIX) 40 mg tablet Duplicate order         .............................................................................................    HPI/Subjective:     Ms. 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  She was referred to electrophysiology for evaluation for ICD/CRTD.     Today, she notes she  feels well. She is accompanied by her . In terms of symptoms, she denied chest pain, palpitations, shortness of breath, dyspnea on exertion, dizziness, lightheadedness.     We first discussed the indications for ICD.  We reviewed the information found on the shared decision-making tool.  We reviewed that when patients have a low ejection fraction or high risk for arrhythmias.  We discussed ICD implant including procedural technique and recovery.    Otherwise, much of the discussion was held in an attempt to discern her functional status.  I posed the questions of activity in multiple ways, however, she denies any limitations in activity and states she feels well.  She is able to perform cardiac rehab activities without limitation.  While the METs are listed as low on these activities, she states once again she has no symptoms.  She is able to work around the house using a leaf blower and was able to walk up 2 flights of stairs without any issues.  After questioning, she appears to be solidly in the NYHA class I category.  This means that she is not a candidate for CRT-D therapy at this time despite her left bundle branch block.    In the end, she agreed to proceed with ICD implant.  We will implant a dual-chamber ICD as she has documented bradycardia at 51 bpm and could benefit from atrial pacing.  We discussed that in the future should she develop worsening symptoms or decreasing exercise tolerance we could revisit CRT-D implant.  She notes for now, she would like to limit the amount of leads placed.          Complete 12 point ROS reviewed and otherwise non pertinent or negative except as per HPI pertinent positives in Cardiovascular and Respiratory emphasized. Please see paper chart for outpatient clinic patients where the patient completed the 12 point ROS survey.           Past Medical History:   Diagnosis Date    Edmondson esophagus     Bundle-branch block     Cervical spine fracture (HCC)     Colon polyp      GERD (gastroesophageal reflux disease)     Hypertension     Post-nasal drip     causing cough    Pure hypercholesterolemia 07/01/2019       No Known Allergies  I reviewed the Home Medication list and Allergies in the chart.   Scheduled Meds:  Current Outpatient Medications   Medication Sig Dispense Refill    amiodarone 200 mg tablet TAKE 1 TABLET BY MOUTH EVERY DAY 90 tablet 0    aspirin 81 mg chewable tablet Chew 1 tablet (81 mg total) daily 30 tablet 0    co-enzyme Q-10 30 MG capsule Take 30 mg by mouth 3 (three) times a day      Cranberry 300 MG tablet Take 300 mg by mouth 2 (two) times a day      denosumab (PROLIA) 60 mg/mL Inject 60 mg under the skin every 6 (six) months      metoprolol succinate (TOPROL-XL) 50 mg 24 hr tablet TAKE 1 TABLET BY MOUTH EVERY 12 HOURS. 180 tablet 1    Multiple Vitamin (multivitamin) tablet Take 1 tablet by mouth daily      pantoprazole (PROTONIX) 40 mg tablet TAKE 1 TABLET BY MOUTH EVERY DAY 90 tablet 1    pravastatin (PRAVACHOL) 40 mg tablet Take 1 tablet (40 mg total) by mouth daily 30 tablet 0    sacubitril-valsartan (Entresto) 24-26 MG TABS TAKE 1 TABLET BY MOUTH TWICE A DAY 60 tablet 3    TURMERIC PO Take by mouth       No current facility-administered medications for this visit.     PRN Meds:.        Family History   Problem Relation Age of Onset    Colon polyps Sister     Colon cancer Neg Hx        Social History     Socioeconomic History    Marital status: /Civil Union     Spouse name: Not on file    Number of children: Not on file    Years of education: Not on file    Highest education level: Not on file   Occupational History    Not on file   Tobacco Use    Smoking status: Never     Passive exposure: Never    Smokeless tobacco: Never   Vaping Use    Vaping status: Never Used   Substance and Sexual Activity    Alcohol use: Never    Drug use: Never    Sexual activity: Not on file   Other Topics Concern    Not on file   Social History Narrative    Not on file  "    Social Determinants of Health     Financial Resource Strain: Not on file   Food Insecurity: No Food Insecurity (5/29/2024)    Hunger Vital Sign     Worried About Running Out of Food in the Last Year: Never true     Ran Out of Food in the Last Year: Never true   Transportation Needs: No Transportation Needs (5/29/2024)    PRAPARE - Transportation     Lack of Transportation (Medical): No     Lack of Transportation (Non-Medical): No   Physical Activity: Not on file   Stress: Not on file   Social Connections: Not on file   Intimate Partner Violence: Not on file   Housing Stability: Unknown (5/29/2024)    Housing Stability Vital Sign     Unable to Pay for Housing in the Last Year: No     Number of Times Moved in the Last Year: Not on file     Homeless in the Last Year: No         OBJECTIVE:    /64 (BP Location: Left arm, Patient Position: Sitting, Cuff Size: Standard)   Pulse (!) 51   Ht 5' 2\" (1.575 m)   Wt 57.7 kg (127 lb 3.2 oz)   BMI 23.27 kg/m²   Vitals:    09/17/24 1132   Weight: 57.7 kg (127 lb 3.2 oz)     GEN: No acute distress, Alert and oriented, well appearing  HEENT Normocephalic, atraumatic   CARDIOVASCULAR:  RRR, No murmur, rub, gallops S1,S2  LUNGS: Clear To auscultation bilaterally, normal effort, no rales, rhonchi, crackles   ABDOMEN:  nondistended,  without obvious organomegaly or ascites  EXTREMITIES/VASCULAR:  No edema. warm an well perfused.  PSYCH: Normal Affect,  linear speech pattern without evidence of psychosis.   NEURO: Grossly intact, moving all extremiteis equal, face symmetric, alert and responsive, no obvious focal defecits   GAIT:  Ambulates normally without difficulty  HEME: No bleeding, bruising, petechia, purpura   SKIN: No significant rashes on visibile skin, warm, no diaphoresis or pallor.     Lab Results:       LABS:      Chemistry        Component Value Date/Time    K 4.2 06/02/2024 0632    K 4.7 07/11/2019 0818     (H) 06/02/2024 0632     07/11/2019 0818 " "   CO2 17 (L) 06/02/2024 0632    CO2 23 07/11/2019 0818    BUN 25 06/02/2024 0632    BUN 17 07/11/2019 0818    CREATININE 0.74 06/02/2024 0632        Component Value Date/Time    CALCIUM 8.0 (L) 06/02/2024 0632    ALKPHOS 72 06/02/2024 0632    AST 22 06/02/2024 0632    AST 16 07/11/2019 0818    ALT 24 06/02/2024 0632    ALT 11 07/11/2019 0818            No results found for: \"CHOL\"  Lab Results   Component Value Date    HDL 49 (L) 05/28/2024     Lab Results   Component Value Date    LDLCALC 71 05/28/2024     Lab Results   Component Value Date    TRIG 116 05/28/2024     No results found for: \"CHOLHDL\"    IMAGING: Echo complete w/ contrast if indicated    Result Date: 9/6/2024  Narrative:   Left Ventricle: Left ventricular cavity size is mildly dilated. Wall thickness is mildly increased. The left ventricular ejection fraction is 30%. Systolic function is severely reduced. There is severe global hypokinesis with regional variation. Diastolic function is moderately abnormal, consistent with grade II (pseudonormal) relaxation.   Right Ventricle: Right ventricular cavity size is normal. Systolic function is normal.   Left Atrium: The atrium is mildly dilated.   Aortic Valve: There is mild regurgitation.   Mitral Valve: There is mild annular calcification. There is moderate regurgitation.   Tricuspid Valve: There is mild regurgitation. The right ventricular systolic pressure is mildly elevated.        Cardiac testing:         I reviewed and interpreted the following LABS/EKG/TELE/IMAGING and below is summary of my interpretation (if data available):          Current EKG performed at today's visit and read by me personally reveals sinus bradycardia with left bundle branch block    ECHO 9/6/24    Left Ventricle: Left ventricular cavity size is mildly dilated. Wall thickness is mildly increased. The left ventricular ejection fraction is 30%. Systolic function is severely reduced. There is severe global hypokinesis with " regional variation. Diastolic function is moderately abnormal, consistent with grade II (pseudonormal) relaxation.    Right Ventricle: Right ventricular cavity size is normal. Systolic function is normal.    Left Atrium: The atrium is mildly dilated.    Aortic Valve: There is mild regurgitation.    Mitral Valve: There is mild annular calcification. There is moderate regurgitation.    Tricuspid Valve: There is mild regurgitation. The right ventricular systolic pressure is mildly elevated.

## 2024-09-17 NOTE — TELEPHONE ENCOUNTER
Patient scheduled for Dual chamber ICD at [facility] on   9/26/2024   with Dr Posey.    Patient aware of general instructions, labs order.     Meds holds:   Entresto to hold the night prior and morning of the procedure.

## 2024-09-17 NOTE — PROGRESS NOTES
HEART AND VASCULAR  CARDIAC ELECTROPHYSIOLOGY   HEART RHYTHM CENTER  Counts include 234 beds at the [facility]    Outpatient New Consult for evaluation for ICD implant   Today's Date: 09/17/24        Patient name: Noble Chou  YOB: 1945  Sex: female         Chief Complaint: as above      ASSESSMENT:  Problem List Items Addressed This Visit       Pure hypercholesterolemia    LBBB (left bundle branch block)    Primary hypertension    Nonischemic cardiomyopathy (HCC) - Primary    Relevant Orders    POCT ECG (Completed)    HFrEF (heart failure with reduced ejection fraction) (HCC)             PLAN:  Nonischemic cardiomyopathy, EF less than 30 despite GDMT  -Continue aspirin 81 mg daily  -Continue metoprolol succinate 50 mg every 12 hours  -Continue Entresto 24-26 mg twice daily  -Plan for dual-chamber ICD as patient is functional class NYHA class I    History of NSVT  -At time of initial diagnosis was having NSVT  -Placed on amiodarone  -Now on good dose of beta-blocker  -Will discontinue amiodarone once dual chamber ICD is implanted    Hypercholesterolemia  -Continue pravastatin 40 mg daily    Primary hypertension  -Continue metoprolol succinate 50 mg every 12 hours  -Continue Entresto 24-26 mg tablets twice daily      Follow up in: 3 months    Orders Placed This Encounter   Procedures    POCT ECG     Medications Discontinued During This Encounter   Medication Reason    pantoprazole (PROTONIX) 40 mg tablet Duplicate order         .............................................................................................    HPI/Subjective:     Ms. 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  She was referred to electrophysiology for evaluation for ICD/CRTD.     Today, she notes she  feels well. She is accompanied by her . In terms of symptoms, she denied chest pain, palpitations, shortness of breath, dyspnea on exertion, dizziness, lightheadedness.     We first discussed the indications for ICD.  We reviewed the information found on the shared decision-making tool.  We reviewed that when patients have a low ejection fraction or high risk for arrhythmias.  We discussed ICD implant including procedural technique and recovery.    Otherwise, much of the discussion was held in an attempt to discern her functional status.  I posed the questions of activity in multiple ways, however, she denies any limitations in activity and states she feels well.  She is able to perform cardiac rehab activities without limitation.  While the METs are listed as low on these activities, she states once again she has no symptoms.  She is able to work around the house using a leaf blower and was able to walk up 2 flights of stairs without any issues.  After questioning, she appears to be solidly in the NYHA class I category.  This means that she is not a candidate for CRT-D therapy at this time despite her left bundle branch block.    In the end, she agreed to proceed with ICD implant.  We will implant a dual-chamber ICD as she has documented bradycardia at 51 bpm and could benefit from atrial pacing.  We discussed that in the future should she develop worsening symptoms or decreasing exercise tolerance we could revisit CRT-D implant.  She notes for now, she would like to limit the amount of leads placed.          Complete 12 point ROS reviewed and otherwise non pertinent or negative except as per HPI pertinent positives in Cardiovascular and Respiratory emphasized. Please see paper chart for outpatient clinic patients where the patient completed the 12 point ROS survey.           Past Medical History:   Diagnosis Date    Edmondson esophagus     Bundle-branch block     Cervical spine fracture (HCC)     Colon polyp      GERD (gastroesophageal reflux disease)     Hypertension     Post-nasal drip     causing cough    Pure hypercholesterolemia 07/01/2019       No Known Allergies  I reviewed the Home Medication list and Allergies in the chart.   Scheduled Meds:  Current Outpatient Medications   Medication Sig Dispense Refill    amiodarone 200 mg tablet TAKE 1 TABLET BY MOUTH EVERY DAY 90 tablet 0    aspirin 81 mg chewable tablet Chew 1 tablet (81 mg total) daily 30 tablet 0    co-enzyme Q-10 30 MG capsule Take 30 mg by mouth 3 (three) times a day      Cranberry 300 MG tablet Take 300 mg by mouth 2 (two) times a day      denosumab (PROLIA) 60 mg/mL Inject 60 mg under the skin every 6 (six) months      metoprolol succinate (TOPROL-XL) 50 mg 24 hr tablet TAKE 1 TABLET BY MOUTH EVERY 12 HOURS. 180 tablet 1    Multiple Vitamin (multivitamin) tablet Take 1 tablet by mouth daily      pantoprazole (PROTONIX) 40 mg tablet TAKE 1 TABLET BY MOUTH EVERY DAY 90 tablet 1    pravastatin (PRAVACHOL) 40 mg tablet Take 1 tablet (40 mg total) by mouth daily 30 tablet 0    sacubitril-valsartan (Entresto) 24-26 MG TABS TAKE 1 TABLET BY MOUTH TWICE A DAY 60 tablet 3    TURMERIC PO Take by mouth       No current facility-administered medications for this visit.     PRN Meds:.        Family History   Problem Relation Age of Onset    Colon polyps Sister     Colon cancer Neg Hx        Social History     Socioeconomic History    Marital status: /Civil Union     Spouse name: Not on file    Number of children: Not on file    Years of education: Not on file    Highest education level: Not on file   Occupational History    Not on file   Tobacco Use    Smoking status: Never     Passive exposure: Never    Smokeless tobacco: Never   Vaping Use    Vaping status: Never Used   Substance and Sexual Activity    Alcohol use: Never    Drug use: Never    Sexual activity: Not on file   Other Topics Concern    Not on file   Social History Narrative    Not on file  "    Social Determinants of Health     Financial Resource Strain: Not on file   Food Insecurity: No Food Insecurity (5/29/2024)    Hunger Vital Sign     Worried About Running Out of Food in the Last Year: Never true     Ran Out of Food in the Last Year: Never true   Transportation Needs: No Transportation Needs (5/29/2024)    PRAPARE - Transportation     Lack of Transportation (Medical): No     Lack of Transportation (Non-Medical): No   Physical Activity: Not on file   Stress: Not on file   Social Connections: Not on file   Intimate Partner Violence: Not on file   Housing Stability: Unknown (5/29/2024)    Housing Stability Vital Sign     Unable to Pay for Housing in the Last Year: No     Number of Times Moved in the Last Year: Not on file     Homeless in the Last Year: No         OBJECTIVE:    /64 (BP Location: Left arm, Patient Position: Sitting, Cuff Size: Standard)   Pulse (!) 51   Ht 5' 2\" (1.575 m)   Wt 57.7 kg (127 lb 3.2 oz)   BMI 23.27 kg/m²   Vitals:    09/17/24 1132   Weight: 57.7 kg (127 lb 3.2 oz)     GEN: No acute distress, Alert and oriented, well appearing  HEENT Normocephalic, atraumatic   CARDIOVASCULAR:  RRR, No murmur, rub, gallops S1,S2  LUNGS: Clear To auscultation bilaterally, normal effort, no rales, rhonchi, crackles   ABDOMEN:  nondistended,  without obvious organomegaly or ascites  EXTREMITIES/VASCULAR:  No edema. warm an well perfused.  PSYCH: Normal Affect,  linear speech pattern without evidence of psychosis.   NEURO: Grossly intact, moving all extremiteis equal, face symmetric, alert and responsive, no obvious focal defecits   GAIT:  Ambulates normally without difficulty  HEME: No bleeding, bruising, petechia, purpura   SKIN: No significant rashes on visibile skin, warm, no diaphoresis or pallor.     Lab Results:       LABS:      Chemistry        Component Value Date/Time    K 4.2 06/02/2024 0632    K 4.7 07/11/2019 0818     (H) 06/02/2024 0632     07/11/2019 0818 " "   CO2 17 (L) 06/02/2024 0632    CO2 23 07/11/2019 0818    BUN 25 06/02/2024 0632    BUN 17 07/11/2019 0818    CREATININE 0.74 06/02/2024 0632        Component Value Date/Time    CALCIUM 8.0 (L) 06/02/2024 0632    ALKPHOS 72 06/02/2024 0632    AST 22 06/02/2024 0632    AST 16 07/11/2019 0818    ALT 24 06/02/2024 0632    ALT 11 07/11/2019 0818            No results found for: \"CHOL\"  Lab Results   Component Value Date    HDL 49 (L) 05/28/2024     Lab Results   Component Value Date    LDLCALC 71 05/28/2024     Lab Results   Component Value Date    TRIG 116 05/28/2024     No results found for: \"CHOLHDL\"    IMAGING: Echo complete w/ contrast if indicated    Result Date: 9/6/2024  Narrative:   Left Ventricle: Left ventricular cavity size is mildly dilated. Wall thickness is mildly increased. The left ventricular ejection fraction is 30%. Systolic function is severely reduced. There is severe global hypokinesis with regional variation. Diastolic function is moderately abnormal, consistent with grade II (pseudonormal) relaxation.   Right Ventricle: Right ventricular cavity size is normal. Systolic function is normal.   Left Atrium: The atrium is mildly dilated.   Aortic Valve: There is mild regurgitation.   Mitral Valve: There is mild annular calcification. There is moderate regurgitation.   Tricuspid Valve: There is mild regurgitation. The right ventricular systolic pressure is mildly elevated.        Cardiac testing:         I reviewed and interpreted the following LABS/EKG/TELE/IMAGING and below is summary of my interpretation (if data available):          Current EKG performed at today's visit and read by me personally reveals sinus bradycardia with left bundle branch block    ECHO 9/6/24    Left Ventricle: Left ventricular cavity size is mildly dilated. Wall thickness is mildly increased. The left ventricular ejection fraction is 30%. Systolic function is severely reduced. There is severe global hypokinesis with " regional variation. Diastolic function is moderately abnormal, consistent with grade II (pseudonormal) relaxation.    Right Ventricle: Right ventricular cavity size is normal. Systolic function is normal.    Left Atrium: The atrium is mildly dilated.    Aortic Valve: There is mild regurgitation.    Mitral Valve: There is mild annular calcification. There is moderate regurgitation.    Tricuspid Valve: There is mild regurgitation. The right ventricular systolic pressure is mildly elevated.

## 2024-09-17 NOTE — LETTER
DEVICE PROCEDURE INSTRUCTIONS    Noble Chou   : 1945  MRN: [id number]  [address]  [facility] [id number]    Procedure: Device implant    Procedure Date:24    Location: [facility]  Address: [address] [place] [id number]    Please call to Short Stay Center [phone] if not get call by 5.00pm.      Labs to be done BEFORE   2024  CMP /  CBC (FASTING 8 HOURS)    Medication Hold:   ENTRESTO: DO NOT take this medication the night prior to the procedure and in the morning of the procedure.      Arrival Time: The Hospital will contact you the day prior to your procedure, usually between 4PM - 6PM to instruct you on the time and place to report. If you do not hear from a [facility]  by 6PM the evening prior to your procedure, please contact the campus you are scheduled at.     You may have NOTHING to eat or drink from midnight the night prior to your procedure including candy & gum.  You may have a SIP of water with your morning medications.   DO NOT stop taking Plavix or Aspirin unless advised otherwise.    Arrange for a responsible adult to drive you to and from the hospital.    Please notify us if you got a  NEW MEDICATION prescribed prior to your procedure or have been admitted to the hospital within 30 days.     You should continue to take your morning medications with a sip of water UNLESS ADVISED OTHERWISE.       DO NOT stop taking Plavix or Aspirin unless advised otherwise.     If you are currently taking Fish Oil, Krill oil and/or Vitamin E please DO NOT TAKE FOR A WEEK PRIOR TO PROCEDURE.     If you are diabetic, DO NOT take any of your diabetic pills the morning of your procedure. Oral diabetic medications may include Glucophage, Prandin, Glyburide, Micronase, Avandia, Glucovance, Precose, Glynase, and Starlix.     Bring a list of daily medications, vitamins, minerals, herbals and nutritional supplements you take. Please include  dosages and the times you take them each day.     If you are packing an overnight bag, pack minimal clothing, you will be given hospital sleepwear.   DO NOT bring money, valuables or jewelry. The wedding band is ok.     If you use CPAP machine, bring it to the hospital.     Bring your Photo ID and Insurance cards with you.       Pre-Operative Showering Instructions. ONLY FOR DEVICE PROCEDURE.    The two nights prior to your procedure, take a shower each night using the special antiseptic body scrub (Chlorhexidine Scrub Brush) you received from the office or hospital. This scrub will replace your soap at home, the sponge is pre-filled with soap.     The scrub brushes are single use only and should be discarded after use.     Shampoo your hair with regular shampoo and rinse completely before using the antiseptic scrub brush.     Using the sponge side of the scrub brush to wash your entire body from your neck down, with special attention to your armpits, chest and groin area. DO NOT USE the scrub on your face and avoid any open wounds. Do not use any other soap or wash your skin.    DO NOT USE any lotion, powder, deodorant or perfume of any kind on your skin after you shower.    AFTER THE FIRST NIGHT of using the scrub, change your bed linen sheets to a fresh clean set and clean pajamas for bedtime.    AFTER THE SECOND NIGHT of using the scrub, use clean pajamas for bedtime.    DO NOT SHOWER THE MORNING OF SURGERY, you will be prepped and cleansed at the hospital prior to your procedure.     If you develop a cold, sore throat, fever or any other illness prior to your procedure date, notify your surgeon immediately      PLEASE  THE SPONGES AT YOUR MOST CONVENIENT [facility] CARDIOLOGY OFFICE.      FAILURE TO FOLLOW ANY OF THESE INSTRUCTIONS COULD RESULT IN THE CANCELLATION OF YOUR PROCEDURE      Please call  [phone] (Supriya) or [phone] (Arian) if you do not hear from the  by 6:00PM the  night before your procedure.    All patients enter through ENTRANCE B.  Parking is available free of charge or park on Parking Deck B.        Thank you,   Dede Bennett  Surgery Coordinator  [facility]   [address] [place] [address]  Ph: [phone]

## 2024-09-19 ENCOUNTER — CLINICAL SUPPORT (OUTPATIENT)
Dept: CARDIAC REHAB | Facility: HOSPITAL | Age: 79
End: 2024-09-19
Attending: INTERNAL MEDICINE
Payer: COMMERCIAL

## 2024-09-19 DIAGNOSIS — I50.20 HFREF (HEART FAILURE WITH REDUCED EJECTION FRACTION) (HCC): Primary | ICD-10-CM

## 2024-09-19 LAB
ALBUMIN SERPL-MCNC: 4.2 G/DL (ref 3.8–4.8)
ALP SERPL-CCNC: 51 IU/L (ref 44–121)
ALT SERPL-CCNC: 14 IU/L (ref 0–32)
AST SERPL-CCNC: 20 IU/L (ref 0–40)
BASOPHILS # BLD AUTO: 0 X10E3/UL (ref 0–0.2)
BASOPHILS NFR BLD AUTO: 1 %
BILIRUB SERPL-MCNC: 0.4 MG/DL (ref 0–1.2)
BUN SERPL-MCNC: 26 MG/DL (ref 8–27)
BUN/CREAT SERPL: 25 (ref 12–28)
CALCIUM SERPL-MCNC: 9.4 MG/DL (ref 8.7–10.3)
CHLORIDE SERPL-SCNC: 107 MMOL/L (ref 96–106)
CO2 SERPL-SCNC: 24 MMOL/L (ref 20–29)
CREAT SERPL-MCNC: 1.06 MG/DL (ref 0.57–1)
EGFR: 53 ML/MIN/1.73
EOSINOPHIL # BLD AUTO: 0.2 X10E3/UL (ref 0–0.4)
EOSINOPHIL NFR BLD AUTO: 3 %
ERYTHROCYTE [DISTWIDTH] IN BLOOD BY AUTOMATED COUNT: 15.1 % (ref 11.7–15.4)
GLOBULIN SER-MCNC: 1.7 G/DL (ref 1.5–4.5)
GLUCOSE SERPL-MCNC: 83 MG/DL (ref 70–99)
HCT VFR BLD AUTO: 40 % (ref 34–46.6)
HGB BLD-MCNC: 12.7 G/DL (ref 11.1–15.9)
IMM GRANULOCYTES # BLD: 0 X10E3/UL (ref 0–0.1)
IMM GRANULOCYTES NFR BLD: 0 %
LYMPHOCYTES # BLD AUTO: 2.6 X10E3/UL (ref 0.7–3.1)
LYMPHOCYTES NFR BLD AUTO: 42 %
MCH RBC QN AUTO: 27.8 PG (ref 26.6–33)
MCHC RBC AUTO-ENTMCNC: 31.8 G/DL (ref 31.5–35.7)
MCV RBC AUTO: 88 FL (ref 79–97)
MONOCYTES # BLD AUTO: 0.5 X10E3/UL (ref 0.1–0.9)
MONOCYTES NFR BLD AUTO: 8 %
NEUTROPHILS # BLD AUTO: 2.8 X10E3/UL (ref 1.4–7)
NEUTROPHILS NFR BLD AUTO: 46 %
PLATELET # BLD AUTO: 236 X10E3/UL (ref 150–450)
POTASSIUM SERPL-SCNC: 4.5 MMOL/L (ref 3.5–5.2)
PROT SERPL-MCNC: 5.9 G/DL (ref 6–8.5)
RBC # BLD AUTO: 4.57 X10E6/UL (ref 3.77–5.28)
SODIUM SERPL-SCNC: 144 MMOL/L (ref 134–144)
WBC # BLD AUTO: 6.1 X10E3/UL (ref 3.4–10.8)

## 2024-09-19 PROCEDURE — 93798 PHYS/QHP OP CAR RHAB W/ECG: CPT

## 2024-09-20 ENCOUNTER — CLINICAL SUPPORT (OUTPATIENT)
Dept: CARDIAC REHAB | Facility: HOSPITAL | Age: 79
End: 2024-09-20
Attending: INTERNAL MEDICINE
Payer: COMMERCIAL

## 2024-09-20 DIAGNOSIS — I50.20 HFREF (HEART FAILURE WITH REDUCED EJECTION FRACTION) (HCC): Primary | ICD-10-CM

## 2024-09-20 PROCEDURE — 93798 PHYS/QHP OP CAR RHAB W/ECG: CPT

## 2024-09-23 ENCOUNTER — NURSE TRIAGE (OUTPATIENT)
Age: 79
End: 2024-09-23

## 2024-09-23 NOTE — TELEPHONE ENCOUNTER
"Reason for Disposition   Information only question and nurse able to answer     Reviewed pre op instructions with patient, advised to shampoo hair both nights prior to procedure then use the scrub brush. Verbalized understanding.    Answer Assessment - Initial Assessment Questions  1. REASON FOR CALL or QUESTION: \"What is your reason for calling today?\" or \"How can I best help you?\" or \"What question do you have that I can help answer?\"      I am scheduled for a pacemaker on Thursday and I have a question regarding the showering/shampooing. Do I wash my hair both nights prior to and then use the scrub brush they gave me?    Protocols used: Information Only Call - No Triage-ADULT-[place]    "

## 2024-09-24 ENCOUNTER — CLINICAL SUPPORT (OUTPATIENT)
Dept: CARDIAC REHAB | Facility: HOSPITAL | Age: 79
End: 2024-09-24
Attending: INTERNAL MEDICINE
Payer: COMMERCIAL

## 2024-09-24 DIAGNOSIS — I50.20 HFREF (HEART FAILURE WITH REDUCED EJECTION FRACTION) (HCC): Primary | ICD-10-CM

## 2024-09-24 PROCEDURE — 93798 PHYS/QHP OP CAR RHAB W/ECG: CPT

## 2024-09-24 NOTE — PROGRESS NOTES
CARDIAC REHABILITATION   ASSESSMENT AND INDIVIDUALIZED TREATMENT PLAN  60 DAY and MEDICAL HOLD          Today's date: 2024   # of Exercise Sessions Completed: 21  Patient name: Noble Chou      : 1945  Age: 78 y.o.       MRN: [id number]  Referring Physician: Dr. Dejuan Ledesma  Cardiologist: Dr. Dejuan Ledesma  Provider: Viraj  Clinician: Catarina Ames MS, CEP    Treatment is tailored to this patient's individual needs.  The ITP was reviewed with the patient and all questions were answered to their satisfaction.  Additional ITP documentation can be found electronically including daily and monthly exercise summaries, daily session notes with ECG summaries, education notes, daily medication reconciliation, and daily physician supervision.      Comments: Mrs. Chou has attended 21 sessions of cardiac rehab and is being placed on a medical hold at this time due to getting a pacemaker put in this week. She plans to return to rehab following clearance from her physician following the procedure. Over the past 60 days Noble reports increased strength and stamina. She has been able to increase exercise intensities appropriately.       Dx:   Encounter Diagnosis   Name Primary?    HFrEF (heart failure with reduced ejection fraction) (HCC) Yes       Description of Diagnosis: CHF-EF 30%, non ischemic cardiomyopathy  Date of onset: 2024  Other Cardiac History: none        ASSESSMENT    Medical History:   Past Medical History:   Diagnosis Date    Edmondson esophagus     Bundle-branch block     Cervical spine fracture (HCC)     Colon polyp     GERD (gastroesophageal reflux disease)     Hypertension     Post-nasal drip     causing cough    Pure hypercholesterolemia 2019       Family History:  Family History   Problem Relation Age of Onset    Colon polyps Sister     Colon cancer Neg Hx        Allergies:   Patient has no known allergies.    Current Medications:   Current Outpatient Medications    Medication Sig Dispense Refill    amiodarone 200 mg tablet TAKE 1 TABLET BY MOUTH EVERY DAY 90 tablet 0    aspirin 81 mg chewable tablet Chew 1 tablet (81 mg total) daily 30 tablet 0    co-enzyme Q-10 30 MG capsule Take 30 mg by mouth 3 (three) times a day      Cranberry 300 MG tablet Take 300 mg by mouth 2 (two) times a day      denosumab (PROLIA) 60 mg/mL Inject 60 mg under the skin every 6 (six) months      metoprolol succinate (TOPROL-XL) 50 mg 24 hr tablet TAKE 1 TABLET BY MOUTH EVERY 12 HOURS. 180 tablet 1    Multiple Vitamin (multivitamin) tablet Take 1 tablet by mouth daily      pantoprazole (PROTONIX) 40 mg tablet TAKE 1 TABLET BY MOUTH EVERY DAY 90 tablet 1    pravastatin (PRAVACHOL) 40 mg tablet Take 1 tablet (40 mg total) by mouth daily 30 tablet 0    sacubitril-valsartan (Entresto) 24-26 MG TABS TAKE 1 TABLET BY MOUTH TWICE A DAY 60 tablet 3    TURMERIC PO Take by mouth       No current facility-administered medications for this visit.       Medication compliance: Yes   Comments: Pt reports to be compliant with medications    Physical Limitations: neck, shoulder pain from old accident, R hip replacement     Fall Risk: Low   Comments: Ambulates with a steady gait with no assist device    Cultural needs: none      CAD Risk Factors:  Cholesterol: No  HTN: Yes  DM: No  Obesity: No   Inactivity: Yes      EXERCISE ASSESSMENT:     Initial Fitness Assessment: Anisa Treadmill Protocol:  Resting:  BP: 112/68  HR: 47, Exercise:  BP: 122/68  HR: 65, METs:  2.8, and ECG Summary: sinus doug- NSR      ECG INTERPRETATION:  Sinus doug at rest, NSR with exercise, BBB    Current Functional Status:  Occupation: retired  Recreation/Physical Activity: none  ADL’s:Capable of performing light to moderate ADLs  [place]: No limitations  Home exercise:  has been doing home PT exercises for leg strength/stretching  Other Comments: n/a      SMART Exercise Goals:   10% improvement in functional capacity based on max METs  achieved in initial fitness assessment  reduced dyspnea with physical activity    improved DASI score by 10%  increased exercise capacity by 40% based on peak METs tolerated in cardiac rehab exercise session  maintain > 150 minutes per week of moderate intensity exercise    Patient Specific EXERCISE GOALS:       Increase overall strength and endurance with regular exercise program  Be able to do vacuuming, washing floors, and walking at store without shortness of breath or difficulty  Establish regular exercise program at CR    Functional Capacity Screening Tool:  [facility] Activity Status Index:  5.59 METs      PSYCHOSOCIAL ASSESSMENT:    Date of last Assessment:  8/5/2024  Depression screening:  PHQ-9 = 0    Interpretation:  0 =No Depression  Anxiety screening:  MARSHA-7 = 0    Interpretation: 0-4  = Not anxious    Pt self-report of depression and anxiety   Patient reports they are coping well with good social support and denies depression or anxiety  Reports sufficient emotional support     Self-reported stress level:  2   Stressors:  very minimal stress-some health related stress  Stress Management Tools: practice Relaxation Techniques, exercise, keep a positive mindset, spend time outside, enjoy a hobby, and spend time with family    SMART Psychosocial Goals:     Physical Fitness in [facility] Score < 3, Daily Activity in [facility] Score < 3, Overall Health in [facility] Score < 3, Quality of Life in [facility] Score < 3 , and feel less tired with more energy    Patient Specific PSYCHOCOSOCIAL GOALS:    No specific psychosocial goals at this time      Quality of Life Screen:  (Higher score indicates disease impact on QOL)  [facility] COOP score: 17/45     Social Support:   spouse and children  Community/Social Activities: none     Psychosocial Assessment as it relates to rehabilitation:   Patient denies issues with his/her family or home life that may affect their rehabilitation efforts.       NUTRITION  "ASSESSMENT:    Initial Weight:  128.4 lb  Current Weight: 129 lb    Height:   Ht Readings from Last 1 Encounters:   09/17/24 5' 2\" (1.575 m)       Rate Your Plate Score: 65/81    Diabetes: N/A  A1c: n/a    last measured: n/a    Lipid management: Discussed diet and lipid management and Last lipid profile 05/28/2024  Chol 143    HDL 49  LDL 71    Current Dietary Habits:  Reports eating healthy diet overall-low sodium, low saturated fat, fruits and vegetables    SMART Nutrition Goals:   LDL <100, HDL >40, TRG <150, and CHOL <200    Patient Specific NUTRITION GOALS:     1. Continue following low sodium diet   2. Learn to read food labels for sodium content    Drug/Alcohol Use:   No      OTHER CORE COMPONENT ASSESSMENT:    Tobacco Use:     N/A:  Patient is a non-smoker     Anginal Symptoms:  None   NTG use: No prescription    SMART Goals:   consistent, controlled resting BP < 130/80 and medication compliance    Patient Specific CORE COMPONENT GOALS:    Manage CHF with low sodium diet, exercise, and medications  See increase in EF to not have to get ICD-repeat echo September        INDIVIDUALIZED TREATMENT PLAN      EXERCISE GOALS and PLAN      Progress toward Exercise goals:   Noble is on target with her goals. She has been doing well in her program overall and is increasing her exercise times and intensities appropriately. She is gaining strength and stamina with regular aerobic exercise. She is also showing increase in functional capacity with increasing MET levels each week. She is in agreement to continue to attend CR sessions 3x/week for 12 weeks following clearance from her physician after her upcoming procedure.    Exercise Intervention:    Continue to attend CR sessions 3x/week, start a home walking program over the next 30 days, education on home exercise guidelines, home exercise 30+ mins 2 days opposite CR, and Group class: Risk Factors for Heart Disease    The patient was counseled on exercise " guidelines to achieve a minimum of 150 mins/wk of moderate intensity (RPE 4-6)   exercise and encouraged to add 1-2 days of exercise on opposite days of cardiac rehab as tolerated.     Current Aerobic Exercise Prescription:      Frequency: 3 days/week   Supplement with home exercise 2+ days/wk as tolerated       Minutes: 35-40         METS: 2.6-2.8           HR: RHR +30-40bpm   RPE: 4-6         Modalities: Treadmill, UBE, Lifecycle, NuStep, and Recumbent bike     Exercise workloads will be progressed gradually as tolerated, within limits of patient's ability, and according to the patient's   response to the exercise program.      Aerobic Exercise Prescription Plan for Progression   Frequency: 3 days/week of cardiac rehab       Supplement with home exercise 2+ days/wk as tolerated    Minutes: 40-45       >150 mins/wk of moderate intensity exercise   METS: 2.6-3.0   HR: RHR +30-40bpm     RPE: 4-6   Modalities: Treadmill, UBE, Lifecycle, NuStep, and Recumbent bike    Strength trainin-3 days / week  12-15 repetitions  1-2 sets per modality    Modalities: Pull Downs, Lateral Raise, Arm Extension, Arm Curl, Sit to Stands, and Front Raises    Home Exercise:  Home PT exercises, strength and stretching    Exercise Education: benefit of exercise for CAD risk factors, home exercise guidelines, AHA guidelines to achieve >150 mins/wk of moderate exercise, RPE scale, and Group class: Risk Factors for Heart Disease     Readiness to change: Preparation:  (Getting ready to change)  and Action:  (Changing behavior)      NUTRITION GOALS AND PLAN      Nutritional   Reviewed patient's Rate your Plate. Discussed key elements of heart healthy eating. Reviewed patient goals for dietary modifications and their clinical implications.  Reviewed most recent lipid profile.     Patient's progress toward Nutrition goals:    She is on target with her goals. Reports following a low sodium diet and reading food labels to help decrease salt  "intake.      Nutrition Intervention:   group class: Reading Food Labels, group Class: Heart Healthy Eating, increase intake of whole grains, increase daily intake of fruits and vegetables, increase daily intake of low fat dairy, choose lean red meat, drink/use 1%  low fat or skim  milk, cook without fats or oils, use \"light\" tub margarine, choose low sodium canned, frozen, packaged foods or rarely eat these foods, and rarely/never eat salty snacks    Measurable goals were based Rate Your Plate Dietary Self-Assessment. These are the areas in which the patient could score higher on the assessment.  Goals include recommendations for a heart healthy diet based on American Heart Association.    Nutrition Education:   heart healthy eating principles  low sodium diet  maintaining hydration  nutrition for  lipid management  group class: Heart Healthy Eating  group class:  Label Reading    Readiness to change: Action:  (Changing behavior)      PSYCHOSOCIAL GOALS AND PLAN    Psychosocial Assessment as it relates to rehabilitation:   Patient denies issues with his/her family or home life that may affect their rehabilitation efforts.     Patient's progress toward Psychosocial goals:    Noble is on target with goals. She has no concerns with depression or anxiety at this time.    Psychosocial Intervention:   Class: Stress and Your Health, Class: Relaxation, Practice relaxation techniques, Exercise, Spend time outdoors, and Keep a positive mindset    Psychosocial Education: signs/sxs of depression, benefits of a positive support system, stress management techniques, and depression and CAD    Information to utilize [employer] was provided as well as contact information for counseling through  Behavioral Health and group psychotherapy groups available.    Readiness to change: Action:  (Changing behavior)      OTHER CORE COMPONENTS GOALS and PLAN      Blood Pressure will be monitored throughout the program and cardiologist will " be notified of elevated trends.    Pt will be encouraged to monitor home BP if advised by cardiologist.    Tobacco Intervention:   N/A:  Pt is a non-smoker    Progress toward Core Component goals:   Noble is on target with goals. BP is well managed and is typically in lower range. She shows normal hemodynamic response to exercise. She is decreasing salt intake and taking medications as prescribed.    Other Core Components Intervention:   group class: Understanding Heart Disease, group class: Common Heart Medications, and medication compliance    Group and Individual Education:  understanding high blood pressure and it's relationship to CAD, components of blood pressure management, and low sodium diet and heart failure    Readiness to change: Action:  (Changing behavior)

## 2024-09-25 ENCOUNTER — ANESTHESIA EVENT (OUTPATIENT)
Dept: NON INVASIVE DIAGNOSTICS | Facility: HOSPITAL | Age: 79
End: 2024-09-25
Payer: COMMERCIAL

## 2024-09-26 ENCOUNTER — ANESTHESIA (OUTPATIENT)
Dept: NON INVASIVE DIAGNOSTICS | Facility: HOSPITAL | Age: 79
End: 2024-09-26
Payer: COMMERCIAL

## 2024-09-26 ENCOUNTER — HOSPITAL ENCOUNTER (OUTPATIENT)
Facility: HOSPITAL | Age: 79
Setting detail: OUTPATIENT SURGERY
Discharge: HOME | End: 2024-09-26
Attending: STUDENT IN AN ORGANIZED HEALTH CARE EDUCATION/TRAINING PROGRAM | Admitting: STUDENT IN AN ORGANIZED HEALTH CARE EDUCATION/TRAINING PROGRAM
Payer: COMMERCIAL

## 2024-09-26 ENCOUNTER — APPOINTMENT (OUTPATIENT)
Dept: CARDIAC REHAB | Facility: HOSPITAL | Age: 79
End: 2024-09-26
Attending: INTERNAL MEDICINE
Payer: COMMERCIAL

## 2024-09-26 ENCOUNTER — APPOINTMENT (OUTPATIENT)
Dept: RADIOLOGY | Facility: HOSPITAL | Age: 79
End: 2024-09-26
Payer: COMMERCIAL

## 2024-09-26 VITALS
TEMPERATURE: 97.2 F | HEART RATE: 59 BPM | BODY MASS INDEX: 23 KG/M2 | WEIGHT: 125 LBS | OXYGEN SATURATION: 95 % | RESPIRATION RATE: 16 BRPM | SYSTOLIC BLOOD PRESSURE: 136 MMHG | HEIGHT: 62 IN | DIASTOLIC BLOOD PRESSURE: 61 MMHG

## 2024-09-26 DIAGNOSIS — I44.7 LBBB (LEFT BUNDLE BRANCH BLOCK): ICD-10-CM

## 2024-09-26 DIAGNOSIS — Z95.810 S/P ICD (INTERNAL CARDIAC DEFIBRILLATOR) PROCEDURE: Primary | ICD-10-CM

## 2024-09-26 DIAGNOSIS — I42.8 NONISCHEMIC CARDIOMYOPATHY (HCC): ICD-10-CM

## 2024-09-26 LAB
ANION GAP SERPL CALCULATED.3IONS-SCNC: 8 MMOL/L (ref 4–13)
ATRIAL RATE: 50 BPM
ATRIAL RATE: 60 BPM
BUN SERPL-MCNC: 30 MG/DL (ref 5–25)
CALCIUM SERPL-MCNC: 9 MG/DL (ref 8.4–10.2)
CHLORIDE SERPL-SCNC: 107 MMOL/L (ref 96–108)
CO2 SERPL-SCNC: 26 MMOL/L (ref 21–32)
CREAT SERPL-MCNC: 1.12 MG/DL (ref 0.6–1.3)
ERYTHROCYTE [DISTWIDTH] IN BLOOD BY AUTOMATED COUNT: 15.1 % (ref 11.6–15.1)
GFR SERPL CREATININE-BSD FRML MDRD: 46 ML/MIN/1.73SQ M
GLUCOSE P FAST SERPL-MCNC: 82 MG/DL (ref 65–99)
GLUCOSE SERPL-MCNC: 82 MG/DL (ref 65–140)
HCT VFR BLD AUTO: 39.6 % (ref 34.8–46.1)
HGB BLD-MCNC: 12.6 G/DL (ref 11.5–15.4)
INR PPP: 1.05 (ref 0.85–1.19)
MCH RBC QN AUTO: 27.6 PG (ref 26.8–34.3)
MCHC RBC AUTO-ENTMCNC: 31.8 G/DL (ref 31.4–37.4)
MCV RBC AUTO: 87 FL (ref 82–98)
P AXIS: 4 DEGREES
P AXIS: 55 DEGREES
PLATELET # BLD AUTO: 230 THOUSANDS/UL (ref 149–390)
PMV BLD AUTO: 10.2 FL (ref 8.9–12.7)
POTASSIUM SERPL-SCNC: 4.6 MMOL/L (ref 3.5–5.3)
PR INTERVAL: 206 MS
PR INTERVAL: 224 MS
PROTHROMBIN TIME: 14 SECONDS (ref 12.3–15)
QRS AXIS: 16 DEGREES
QRS AXIS: 5 DEGREES
QRSD INTERVAL: 140 MS
QRSD INTERVAL: 146 MS
QT INTERVAL: 508 MS
QT INTERVAL: 530 MS
QTC INTERVAL: 483 MS
QTC INTERVAL: 508 MS
RBC # BLD AUTO: 4.56 MILLION/UL (ref 3.81–5.12)
SODIUM SERPL-SCNC: 141 MMOL/L (ref 135–147)
T WAVE AXIS: 120 DEGREES
T WAVE AXIS: 148 DEGREES
VENTRICULAR RATE: 50 BPM
VENTRICULAR RATE: 60 BPM
WBC # BLD AUTO: 7.79 THOUSAND/UL (ref 4.31–10.16)

## 2024-09-26 PROCEDURE — 80048 BASIC METABOLIC PNL TOTAL CA: CPT | Performed by: PHYSICIAN ASSISTANT

## 2024-09-26 PROCEDURE — C1898 LEAD, PMKR, OTHER THAN TRANS: HCPCS | Performed by: STUDENT IN AN ORGANIZED HEALTH CARE EDUCATION/TRAINING PROGRAM

## 2024-09-26 PROCEDURE — 93010 ELECTROCARDIOGRAM REPORT: CPT | Performed by: INTERNAL MEDICINE

## 2024-09-26 PROCEDURE — 71045 X-RAY EXAM CHEST 1 VIEW: CPT

## 2024-09-26 PROCEDURE — C1887 CATHETER, GUIDING: HCPCS | Performed by: STUDENT IN AN ORGANIZED HEALTH CARE EDUCATION/TRAINING PROGRAM

## 2024-09-26 PROCEDURE — 85610 PROTHROMBIN TIME: CPT | Performed by: PHYSICIAN ASSISTANT

## 2024-09-26 PROCEDURE — C1721 AICD, DUAL CHAMBER: HCPCS | Performed by: STUDENT IN AN ORGANIZED HEALTH CARE EDUCATION/TRAINING PROGRAM

## 2024-09-26 PROCEDURE — 33249 INSJ/RPLCMT DEFIB W/LEAD(S): CPT | Performed by: STUDENT IN AN ORGANIZED HEALTH CARE EDUCATION/TRAINING PROGRAM

## 2024-09-26 PROCEDURE — C1892 INTRO/SHEATH,FIXED,PEEL-AWAY: HCPCS | Performed by: STUDENT IN AN ORGANIZED HEALTH CARE EDUCATION/TRAINING PROGRAM

## 2024-09-26 PROCEDURE — C1769 GUIDE WIRE: HCPCS | Performed by: STUDENT IN AN ORGANIZED HEALTH CARE EDUCATION/TRAINING PROGRAM

## 2024-09-26 PROCEDURE — 85027 COMPLETE CBC AUTOMATED: CPT | Performed by: PHYSICIAN ASSISTANT

## 2024-09-26 PROCEDURE — NC001 PR NO CHARGE: Performed by: STUDENT IN AN ORGANIZED HEALTH CARE EDUCATION/TRAINING PROGRAM

## 2024-09-26 PROCEDURE — 93005 ELECTROCARDIOGRAM TRACING: CPT

## 2024-09-26 PROCEDURE — C1777 LEAD, AICD, ENDO SINGLE COIL: HCPCS | Performed by: STUDENT IN AN ORGANIZED HEALTH CARE EDUCATION/TRAINING PROGRAM

## 2024-09-26 DEVICE — ENVELOPE CMRM6133 ABSORB LRG MR
Type: IMPLANTABLE DEVICE | Site: CHEST | Status: FUNCTIONAL
Brand: TYRX™

## 2024-09-26 DEVICE — LEAD 6935M62 QUATTRO SECURE S MRI US
Type: IMPLANTABLE DEVICE | Site: HEART | Status: FUNCTIONAL
Brand: SPRINT QUATTRO SECURE S MRI™ SURESCAN™

## 2024-09-26 DEVICE — ICD DDPA2D4 COBALT XT DR MRI DF4 USA
Type: IMPLANTABLE DEVICE | Site: CHEST | Status: FUNCTIONAL
Brand: COBALT™ XT DR MRI SURESCAN™

## 2024-09-26 DEVICE — LEAD 5076-52 MRI US RCMCRD
Type: IMPLANTABLE DEVICE | Site: HEART | Status: FUNCTIONAL
Brand: CAPSUREFIX NOVUS MRI™ SURESCAN®

## 2024-09-26 RX ORDER — FENTANYL CITRATE 50 UG/ML
INJECTION, SOLUTION INTRAMUSCULAR; INTRAVENOUS AS NEEDED
Status: DISCONTINUED | OUTPATIENT
Start: 2024-09-26 | End: 2024-09-26

## 2024-09-26 RX ORDER — GENTAMICIN 40 MG/ML
INJECTION, SOLUTION INTRAMUSCULAR; INTRAVENOUS CODE/TRAUMA/SEDATION MEDICATION
Status: DISCONTINUED | OUTPATIENT
Start: 2024-09-26 | End: 2024-09-26 | Stop reason: HOSPADM

## 2024-09-26 RX ORDER — GLYCOPYRROLATE 0.2 MG/ML
INJECTION INTRAMUSCULAR; INTRAVENOUS AS NEEDED
Status: DISCONTINUED | OUTPATIENT
Start: 2024-09-26 | End: 2024-09-26

## 2024-09-26 RX ORDER — SODIUM CHLORIDE 9 MG/ML
INJECTION, SOLUTION INTRAVENOUS CONTINUOUS PRN
Status: DISCONTINUED | OUTPATIENT
Start: 2024-09-26 | End: 2024-09-26

## 2024-09-26 RX ORDER — LIDOCAINE HYDROCHLORIDE 10 MG/ML
INJECTION, SOLUTION EPIDURAL; INFILTRATION; INTRACAUDAL; PERINEURAL CODE/TRAUMA/SEDATION MEDICATION
Status: DISCONTINUED | OUTPATIENT
Start: 2024-09-26 | End: 2024-09-26 | Stop reason: HOSPADM

## 2024-09-26 RX ORDER — ACETAMINOPHEN 325 MG/1
650 TABLET ORAL EVERY 4 HOURS PRN
Status: DISCONTINUED | OUTPATIENT
Start: 2024-09-26 | End: 2024-09-26 | Stop reason: HOSPADM

## 2024-09-26 RX ORDER — EPHEDRINE SULFATE 50 MG/ML
INJECTION INTRAVENOUS AS NEEDED
Status: DISCONTINUED | OUTPATIENT
Start: 2024-09-26 | End: 2024-09-26

## 2024-09-26 RX ORDER — PHENYLEPHRINE HCL IN 0.9% NACL 1 MG/10 ML
SYRINGE (ML) INTRAVENOUS AS NEEDED
Status: DISCONTINUED | OUTPATIENT
Start: 2024-09-26 | End: 2024-09-26

## 2024-09-26 RX ORDER — CEFAZOLIN SODIUM 2 G/50ML
2000 SOLUTION INTRAVENOUS ONCE
Status: COMPLETED | OUTPATIENT
Start: 2024-09-26 | End: 2024-09-26

## 2024-09-26 RX ORDER — PROPOFOL 10 MG/ML
INJECTION, EMULSION INTRAVENOUS CONTINUOUS PRN
Status: DISCONTINUED | OUTPATIENT
Start: 2024-09-26 | End: 2024-09-26

## 2024-09-26 RX ADMIN — ACETAMINOPHEN 650 MG: 325 TABLET, FILM COATED ORAL at 15:50

## 2024-09-26 RX ADMIN — PROPOFOL 50 MCG/KG/MIN: 10 INJECTION, EMULSION INTRAVENOUS at 10:38

## 2024-09-26 RX ADMIN — SODIUM CHLORIDE: 0.9 INJECTION, SOLUTION INTRAVENOUS at 10:30

## 2024-09-26 RX ADMIN — GLYCOPYRROLATE 0.2 MCG: 0.2 INJECTION, SOLUTION INTRAMUSCULAR; INTRAVENOUS at 10:43

## 2024-09-26 RX ADMIN — FENTANYL CITRATE 50 MCG: 50 INJECTION INTRAMUSCULAR; INTRAVENOUS at 10:38

## 2024-09-26 RX ADMIN — FENTANYL CITRATE 50 MCG: 50 INJECTION INTRAMUSCULAR; INTRAVENOUS at 11:33

## 2024-09-26 RX ADMIN — Medication 100 MCG: at 11:53

## 2024-09-26 RX ADMIN — EPHEDRINE SULFATE 15 MG: 50 INJECTION, SOLUTION INTRAVENOUS at 11:34

## 2024-09-26 RX ADMIN — Medication 100 MCG: at 11:39

## 2024-09-26 RX ADMIN — CEFAZOLIN SODIUM 2000 MG: 2 SOLUTION INTRAVENOUS at 10:34

## 2024-09-26 RX ADMIN — EPHEDRINE SULFATE 10 MG: 50 INJECTION, SOLUTION INTRAVENOUS at 10:58

## 2024-09-26 NOTE — INTERVAL H&P NOTE
H&P reviewed. After examining the patient I find no changes in the patients condition since the H&P had been written.    Noble Chou is an 78 year old female with a history of NICM EF 30%, HFrEF, LBBB  ms, NSVT, HTN, HLD who presents to [facility] for implantation of a primary prevention ICD.  She has a persistently low EF despite GDMT for more than 3 months.  She has NYHA class 1 symptoms and therefore dual chamber ICD will be placed (A lead for atrial discrimination and possible need for A pacing) rather than CRT-D.  Today, she is feeling well.  She denies chest pain, shortness of breath, lightness, dizziness, orthopnea, or PND.  She is n.p.o. for the procedure today.    Vitals:    09/26/24 0851   BP: (!) 191/81   Pulse: (!) 53   Resp: 16   Temp: (!) 97.1 °F (36.2 °C)   SpO2: 99%     GEN: NAD, alert and oriented x 3, well appearing  SKIN: warm, dry without significant lesions or rashes  HEENT: NCAT  NECK: supple, no JVD appreciated  CARDIOVASCULAR: RRR, normal S1, S2 without murmurs, rubs, or gallops   LUNGS: CTA bilaterally without wheezes, rhonchi, or rales  ABDOMEN: Soft, nontender, nondistended.   EXTREMITIES/VASCULAR: perfused without clubbing, cyanosis, or LE edema b/l  PSYCH: Normal mood and affect  NEURO: CN ll-Xll grossly intact    EKG: sinus bradycardia with 1st degree AV block HR 50 bpm, LBBB  ms    Impression/Plan:  1) NICM EF 30%  2) HFrEF  3) LBBB  ms  4) 1st degree AV block  ms  5) NSVT  6) HTN  7) HLD    Plan:  -- NPO for implantation of primary prevention ICD  -- check post op chest x-ray  -- check device interrogation post procedure  -- activity restrictions x 6 weeks  -- keep implant site dry x 1 week  -- D/C home post procedure

## 2024-09-26 NOTE — H&P
Assessment & Plan     Nonischemic cardiomyopathy, EF less than 30 despite GDMT  -Continue aspirin 81 mg daily  -Continue metoprolol succinate 50 mg every 12 hours  -Continue Entresto 24-26 mg twice daily  -Plan for dual-chamber ICD as patient is functional class NYHA class I     History of NSVT  -At time of initial diagnosis was having NSVT  -Placed on amiodarone  -Now on good dose of beta-blocker  -Will discontinue amiodarone once dual chamber ICD is implanted     Hypercholesterolemia  -Continue pravastatin 40 mg daily     Primary hypertension  -Continue metoprolol succinate 50 mg every 12 hours  -Continue Entresto 24-26 mg tablets twice daily        HPI:    Noble Chou is an 78 y.o. 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 She was referred to electrophysiology for evaluation for ICD/CRTD.     She was seen in outpatient EP clinic on 9/17/24 at which time we discussed ICD implant.  She technically qualified for CRT-D based on echo and EKG (LBBB), but due to her NYHA class I status she did not meet criteria.  She also noted that she would like to proceed with minimal intervention.     Given this, she was scheduled for dual chamber ICD implant. She presents today for that procedure.            Past Surgical History:   Procedure Laterality Date    CARDIAC CATHETERIZATION N/A 5/29/2024    Procedure: Cardiac catheterization;  Surgeon: Nilesh Mullen DO;  Location: [facility];  Service: Cardiology    CARDIAC CATHETERIZATION N/A 5/29/2024    Procedure: Cardiac Coronary Angiogram;  Surgeon: Nilesh Mullen DO;  [facility]: [facility];  Service: Cardiology    COLONOSCOPY  09/08/2017    5 yr recall    JOINT REPLACEMENT Right     hip    TIBIAL IM QUIRINO REMOVAL      TOTAL HIP ARTHROPLASTY      UPPER  "GASTROINTESTINAL ENDOSCOPY  10/18/2018    3 yr recall       Review of Systems A complete review of systems was negative in 10/10 systems or as listed above.     Objective     /61   Pulse 59   Temp (!) 97.2 °F (36.2 °C) (Temporal)   Resp 16   Ht 5' 2\" (1.575 m)   Wt 56.7 kg (125 lb)   SpO2 95%   BMI 22.86 kg/m²     Physical Exam  GEN: No acute distress, Alert and oriented, well appearing  HEENT Normocephalic, atraumatic   CARDIOVASCULAR:  RRR, No murmur, rub, gallops S1,S2  LUNGS: Clear To auscultation bilaterally, normal effort, no rales, rhonchi, crackles   ABDOMEN:  nondistended,  without obvious organomegaly or ascites  EXTREMITIES/VASCULAR:  No edema. warm an well perfused.  PSYCH: Normal Affect,  linear speech pattern without evidence of psychosis.   NEURO: Grossly intact, moving all extremiteis equal, face symmetric, alert and responsive, no obvious focal defecits   GAIT:  Ambulates normally without difficulty  HEME: No bleeding, bruising, petechia, purpura   SKIN: No significant rashes on visibile skin, warm, no diaphoresis or pallor.     .SOC  "

## 2024-09-26 NOTE — DISCHARGE INSTR - AVS FIRST PAGE
Reason for Admission:  Sepsis ([facility] [address] [A41.9]                 RUR Score:    11%           Plan for utilizing home health: To be determined                      Likelihood of Readmission:   LOW                         Transition of Care Plan:              Initial assessment completed with patient. Cognitive status of patient: oriented to time, place, person and situation. Face sheet information confirmed:  yes. The patient designates Krish Amezquita, spouse ([phone]) to participate in his discharge plan and to receive any needed information. This patient lives in a single family home with spouse. Patient was able to navigate steps as needed. Prior to hospitalization, patient was considered to be independent with ADLs/IADLS : yes . Patient has a current ACP document on file: no      Healthcare Decision Maker:  No    The patient's spouse will be available to transport patient home upon discharge. The patient reported no medical equipment available in the home. Patient is not currently active with home health. Patient has not stayed in a skilled nursing facility or rehab. This patient is on dialysis :no    Freedom of choice signed: no     Currently, the discharge plan is Home with family assistance  VS home with home health    Patient would benefit from PT/OT evaluations for assist with discharge planning    CM will continue to monitor and assist with transitional needs. The patient states that he can obtain his medications from the pharmacy, and take his medications as directed. Patient's current insurance is [employer]      Care Management Interventions  PCP Verified by CM:  Yes  Mode of Transport at Discharge: Self  Transition of Care Consult (CM Consult): Discharge Planning  Discharge Durable Medical Equipment: No  Physical Therapy Consult: No  Occupational Therapy Consult: No  Speech Therapy Consult: No  Current Support Network: Lives with STOP amiodarone  Continue your other medications as taken previously     Post Procedure Care instructions:     Pain management   Tylenol (acetaminophen) and ice      If you go home on the day of the procedure:  Please send device transmission the following morning      Wound care  For 7 days:  Bandage must remain on wound   Bathing:    If bandage has a good seal on all sides, you may shower   If the bandage is not sealed or there is any question/concern, do not shower. Sponge bathe only     One week after procedure:   Remove bandage    Do not use lotions/powders/creams on incision   Shower normally, do not scrub wound      Restrictions for 6 weeks: (apply these rules only to the arm closest to the incision)   Do not raise elbow above shoulder height   Do not lift greater than 10lbs    No pulling on grab bar or bed rail    When to call clinic:    Redness or swelling at incision site   Opening and/or drainage from the incision   Temperature >100.4 F     If you have any questions:   [phone] 8:30am-5:30pm  [phone] After hours  [phone] Appointments     Information about your cardiac device:   Implantable Cardioverter Defibrillator (ICD)      If you are shocked:      A shock may feel like someone has hit you, or you may feel a thump in the chest. If someone is touching you when you get a shock, they may feel a tingling feeling   Sit or lie down and stay calm. Ask someone to stay with you if possible   Please either call your cardiologist or report to an emergency room     WHAT YOU SHOULD KNOW:       An implantable cardioverter defibrillator (ICD) is a small device that monitors your heart rate and rhythm. It is commonly placed inside your upper chest region. It may be used if you have a ventricular arrhythmia, which is an irregular, dangerous rhythm from the bottom chamber of your heart. Some arrhythmias may cause your heart to suddenly stop beating. An ICD can give a shock to your heart to make it start  Spouse  Confirm Follow Up Transport: Self  Discharge Location  Discharge Placement: (home with family assistance VS home with home health)      YNES Walter, RN  Pager # [phone]  Care Manager beating again, or it can give pacing therapy (also known as pain-free therapy) to return your heart to normal rhythm. It is also a pacemaker, so it will pace your heart if needed to prevent it from beating too slowly.     Frequently asked questions about cardiac devices     How long does the device last?    Approximately 10 years, it depends on how often your body uses it     How is my device monitored?    You will have a 2-week follow-up with our device clinic after the initial placement.  At this visit our device techs will establish your follow up appointments   Transmissions are sent every 3 months    Alerts are sent if there are changes to your heart rhythm or the device in between transmissions     Are there long-term restrictions or are there activities that will affect my device?    Generally once healing is completed there are minimal restrictions long-term   When going through security checkpoints, let them know you have an implanted cardiac device.  You will receive a permeant card, keep this with you   If you have any questions about electronic equipment and your device, please call the device  hotline

## 2024-09-26 NOTE — ANESTHESIA PREPROCEDURE EVALUATION
Procedure:  Cardiac icd implant (Chest)    Relevant Problems   CARDIO   (+) LBBB (left bundle branch block)   (+) Primary hypertension   (+) Pure hypercholesterolemia      GI/HEPATIC   (+) Gastroesophageal reflux disease with esophagitis without hemorrhage      Cardiovascular/Peripheral Vascular   (+) HFrEF (heart failure with reduced ejection fraction) (HCC)   (+) NSVT (nonsustained ventricular tachycardia) (HCC)   (+) Nonischemic cardiomyopathy (HCC)   9/6/24   Left Ventricle: Left ventricular cavity size is mildly dilated. Wall thickness is mildly increased. The left ventricular ejection fraction is 30%. Systolic function is severely reduced. There is severe global hypokinesis with regional variation. Diastolic function is moderately abnormal, consistent with grade II (pseudonormal) relaxation.    Right Ventricle: Right ventricular cavity size is normal. Systolic function is normal.    Left Atrium: The atrium is mildly dilated.    Aortic Valve: There is mild regurgitation.    Mitral Valve: There is mild annular calcification. There is moderate regurgitation.    Tricuspid Valve: There is mild regurgitation. The right ventricular systolic pressure is mildly elevated.     Physical Exam    Airway    Mallampati score: II  TM Distance: >3 FB  Neck ROM: full     Dental    upper dentures    Cardiovascular      Pulmonary   Breath sounds clear to auscultation    Other Findings  post-pubertal.      Anesthesia Plan  ASA Score- 4     Anesthesia Type- IV sedation with anesthesia with ASA Monitors.         Additional Monitors:     Airway Plan:            Plan Factors-Exercise tolerance (METS): >4 METS.    Chart reviewed. EKG reviewed. Imaging results reviewed. Existing labs reviewed. Patient summary reviewed.    Patient is not a current smoker.              Induction- intravenous.    Postoperative Plan-         Informed Consent- Anesthetic plan and risks discussed with patient, spouse and son.  I personally reviewed this  patient with the CRNA. Discussed and agreed on the Anesthesia Plan with the CRNA..

## 2024-09-27 ENCOUNTER — APPOINTMENT (OUTPATIENT)
Dept: CARDIAC REHAB | Facility: HOSPITAL | Age: 79
End: 2024-09-27
Attending: INTERNAL MEDICINE
Payer: COMMERCIAL

## 2024-10-03 ENCOUNTER — TELEPHONE (OUTPATIENT)
Dept: CARDIOLOGY CLINIC | Facility: CLINIC | Age: 79
End: 2024-10-03

## 2024-10-03 ENCOUNTER — IN-CLINIC DEVICE VISIT (OUTPATIENT)
Dept: CARDIOLOGY CLINIC | Facility: CLINIC | Age: 79
End: 2024-10-03

## 2024-10-03 DIAGNOSIS — Z95.810 AICD (AUTOMATIC CARDIOVERTER/DEFIBRILLATOR) PRESENT: Primary | ICD-10-CM

## 2024-10-03 PROCEDURE — 99024 POSTOP FOLLOW-UP VISIT: CPT | Performed by: STUDENT IN AN ORGANIZED HEALTH CARE EDUCATION/TRAINING PROGRAM

## 2024-10-03 NOTE — PROGRESS NOTES
Results for orders placed or performed in visit on 10/03/24   Cardiac EP device report    Narrative    MDT DUAL ICD/ ACTIVE SYSTEM IS MRI CONDITIONAL  DEVICE INTERROGATED IN THE [place] OFFICE. BATTERY VOLTAGE ADEQUATE (10.7 YRS). AP-98%, <0.1%. ALL LEAD PARAMETERS WITHIN NORMAL LIMITS. NO SIGNIFICANT HIGH RATE EPISODES. OPTIVOL INITIALIZING. [facility] OVERSENSING- REPROGRAMMED PVAB METHOD TO PARTIAL+. NORMAL DEVICE FUNCTION. WOUND CHECK (PICTURE IN MEDIA): INCISION CLEAN AND DRY WITH EDGES APPROXIMATED; AQUACEL DRESSING REMOVED; WOUND CARE AND RESTRICTIONS REVIEWED WITH PATIENT. GV

## 2024-10-03 NOTE — TELEPHONE ENCOUNTER
Secure Chat message to address this patient having a nurse visit today to take off her bandage. I called pt back and told her she can take that off herself but she is afraid too. Spoke to Cheryl Villareal and ok'd for pt to go to appt today. cr

## 2024-10-05 ENCOUNTER — NURSE TRIAGE (OUTPATIENT)
Dept: OTHER | Facility: OTHER | Age: 79
End: 2024-10-05

## 2024-10-05 NOTE — TELEPHONE ENCOUNTER
"Regarding: lump near incsion site pacemaker  ----- Message from Ct YAN sent at 10/5/2024 11:32 AM EDT -----  \" I had a pacemaker recently put in. Last week I had my bandages removed, and last night it started aching a bit. I have a lump in between my arm and pacemaker, with some swelling.\"            "

## 2024-10-06 ENCOUNTER — NURSE TRIAGE (OUTPATIENT)
Dept: OTHER | Facility: OTHER | Age: 79
End: 2024-10-06

## 2024-10-06 NOTE — TELEPHONE ENCOUNTER
"Reason for Disposition   [1] Caller has URGENT question AND [2] triager unable to answer question   Surgical incision after sutures or staples removed, questions about    Answer Assessment - Initial Assessment Questions  1. SYMPTOM: \"What's the main symptom you're concerned about?\" (e.g., drainage, incision opened up, pain, redness)        Patient calling to see if her stiches/ device \"lump\" are normal. Patient states she has had her stitches removed and the lump appeared Friday night under her arm    2. ONSET: \"When did  start?\"      Just noticed today    3. SURGERY: \"What surgery did you have?\"      Defib placed in cath lab on 9/26    4. DATE of SURGERY: \"When was the surgery?\"       9/26    5. INCISION SITE: \"Where is the incision located?\"       Upper left chest towards shoulder    6. REDNESS: \"Is there any redness at the incision site?\" If Yes, ask: \"How wide across is the redness?\" (Inches, centimeters)       Redness is more pink    7. PAIN: \"Is there any pain?\" If Yes, ask: \"How bad is it?\"  (Scale 1-10; or mild, moderate, severe)      Was tender yesterday and iced the area. Today less tender    8. BLEEDING: \"Is there any bleeding?\" If Yes, ask: \"How much?\" and \"Where?\"      Denies    9. DRAINAGE: \"Is there any drainage from the incision site?\" If Yes, ask: \"What color and how much?\" (e.g., red, cloudy, pus; drops, teaspoon)      Denies    10. FEVER: \"Do you have a fever?\" If Yes, ask: \"What is your temperature, how was it measured, and when did it start?\"        Denies    Protocols used: Post-Op Incision Symptoms and Questions-Adult-    Patient calling with concern over recent stitch removal. Patient was unaware device was round and raised from chest. Discussed how the device and incision site should look on the chest. Patient aware and verbalized understanding of this and reasons for call back.   "

## 2024-10-07 ENCOUNTER — NURSE TRIAGE (OUTPATIENT)
Age: 79
End: 2024-10-07

## 2024-10-07 ENCOUNTER — CLINICAL SUPPORT (OUTPATIENT)
Dept: CARDIOLOGY CLINIC | Facility: CLINIC | Age: 79
End: 2024-10-07

## 2024-10-07 DIAGNOSIS — R60.9 SWELLING: Primary | ICD-10-CM

## 2024-10-07 PROCEDURE — RECHECK

## 2024-10-07 NOTE — TELEPHONE ENCOUNTER
"Chief Complaint: swelling     History of Present Illness (HPI): pt calls regarding swelling s/p ICD implant 9/26.  Denies redness, denies drainage.  She was applying ice to site for a few days but wasn't sure how long she could continue to use ice for.  She wants to know how long she should expect swelling for    VS/Weight: unsure     Pain: No    Risk Factors: Devices    Recent Testing: Labs    Medicine: tylenol prn for pain    Upcoming Office Visit: Yes    Last Office Visit: 9/17    Additional Comments: advised pt to continue using ice for swelling.  Advised pt I would send a message to find out timeframe of how long to expect swelling for.        Reason for Disposition  • Caller has NON-URGENT question and triager unable to answer question    Answer Assessment - Initial Assessment Questions  1. SYMPTOM: \"What's the main symptom you're concerned about?\" (e.g., redness, pain, drainage)      Swelling   2. ONSET: \"When did swelling  start?\"      Friday after dressing removed   3. SURGERY: \"What surgery was performed?\"      ICD implant  4. DATE of SURGERY: \"When was surgery performed?\"       9/26  5. INCISION SITE: \"Where is the incision located?\"       Upper left chest   6. REDNESS: \"Is there any redness at the incision site?\" If yes, ask: \"How wide across is the redness?\" (Inches, centimeters)       Denies   7. PAIN: \"Is there any pain?\" If Yes, ask: \"How bad is it?\"  (Scale 1-10; or mild, moderate, severe)      Denies pain at site, but says she has some achiness when Tylenol wears off   8. BLEEDING: \"Is there any bleeding?\" If Yes, ask: \"How much?\" and \"Where?\"      Denies   9. DRAINAGE: \"Is there any drainage from the incision site?\" If yes, ask: \"What color and how much?\" (e.g., red, cloudy, pus; drops, teaspoon)      Denies   10. FEVER: \"Do you have a fever?\" If Yes, ask: \"What is your temperature, how was it measured, and when did it start?\"        Denies   11. OTHER SYMPTOMS: \"Do you have any other symptoms?\" " (e.g., shaking chills, weakness, rash elsewhere on body)        Denies    Protocols used: Post-Op Incision Symptoms and Questions-ADULT-[place]

## 2024-10-07 NOTE — PROGRESS NOTES
Patient presented today, s/p ICD implant 9/26/24 with Dr. Posey.  Started to notice swelling at device site on Friday, 4/10 pain, relief with Tylenol.  Denies fevers, chills, no drainage today, incision appears to be healing appropriately.      Srinivas Mallory PA-C assessed site and felt no s/s of infection present.  He instructed patient to call office with any noticeable changes in swelling and/or increase in pain.      Patient questioning when she is able to drive?

## 2024-10-07 NOTE — TELEPHONE ENCOUNTER
Spoke with PT, let her know that it is okay to drive, and to only use cooling and not heat for the wound.

## 2024-10-07 NOTE — TELEPHONE ENCOUNTER
Spoke with pt and she confirms it is swollen on the top and side of ICD implant. She will be coming in today for a site check at 1:30 PM.    Srinivas,  I am attaching you to this message so you are aware.     Thanks!

## 2024-10-21 ENCOUNTER — TELEPHONE (OUTPATIENT)
Dept: CARDIOLOGY CLINIC | Facility: CLINIC | Age: 79
End: 2024-10-21

## 2024-10-21 NOTE — TELEPHONE ENCOUNTER
10/21/24 @ 4:45pm spoke to pt and had her send picture of swollen site. Advised pt to continue using ice 4-5 x's daily for at least 15 min intervals. Sent task to [facility] rider and kalia vasquez

## 2024-10-22 ENCOUNTER — TELEPHONE (OUTPATIENT)
Dept: CARDIOLOGY CLINIC | Facility: CLINIC | Age: 79
End: 2024-10-22

## 2024-10-22 NOTE — TELEPHONE ENCOUNTER
----- Message from Srinivas Mallory PA-C sent at 10/22/2024 11:47 AM EDT -----  Regarding: RE: swollen site  Thanks Alexia, was able to take a look. I see Dr. Salamanca saw the image as well. Appears stable, she is thinner as well so her device may be more pronounced. Can sometimes take up to three months for the healing to settle and swelling to improve. For now so long as its not having increased swelling/worsening pain then she can continue to monitor.    Any objections to that plan Dr. Salamanca?  ----- Message -----  From: Alexia Hodge MA  Sent: 10/22/2024  11:04 AM EDT  To: Srinivas Mallory PA-C  Subject: RE: swollen site                                 They are under the device check/Cardiology Tab.  2nd view image tab.  Let me know if you need help. I can show you. I had to ask Zaynab as well.  ----- Message -----  From: Srinivas Mallory PA-C  Sent: 10/22/2024   8:07 AM EDT  To: Aleixa Hodge MA  Subject: RE: swollen site                                 I don't see any attached images  ----- Message -----  From: Alexia Hodge MA  Sent: 10/21/2024   5:08 PM EDT  To: Zaynab Lundberg; Srinivas Mallory PA-C  Subject: RE: swollen site                                 Srinivas I do remember her but I cannot find the pics from Zaynab.  Can you look at them and let me know if you want me to bring her in again or if you want CXR possibly?  ----- Message -----  From: Zaynab Lundberg  Sent: 10/21/2024   4:52 PM EDT  To: Alexia Hodge MA; Srinivas Mallory PA-C  Subject: swollen site                                     Hi,  Pt called stating her site is not getting any more improved. I did have her send picture, its in my report in EPIC. Still is swollen..ibuprofen did advise her to ice 4-5 x's day for at least 15 min intervals. Not sure if you want her to come back in for site check?    Thanks,  ~Zaynab   pt called late in the day w/ c/o swollen site. pt denies fever, chills. Site is swollen and pt states she does not think  its any worse; however, swelling is not getting anymore reduced. task adry, [facility] and kalia hill ~  (picture attached).

## 2024-10-23 NOTE — TELEPHONE ENCOUNTER
Spoke with patient, explained that PA and Dr. Salamanca felt device site looked fine and that it will need more time to settle and reduce swelling. Also explained that she is very thin where the device is place and her device likely will protrude somewhat.  Asked her to call office if anything changes (I.e., increase in swelling or pain).

## 2024-10-30 ENCOUNTER — TELEPHONE (OUTPATIENT)
Dept: CARDIOLOGY CLINIC | Facility: CLINIC | Age: 79
End: 2024-10-30

## 2024-10-30 DIAGNOSIS — Z95.810 AICD (AUTOMATIC CARDIOVERTER/DEFIBRILLATOR) PRESENT: Primary | ICD-10-CM

## 2024-10-30 NOTE — TELEPHONE ENCOUNTER
Spoke with patient and answered remaining questions per Liliane Rangel PA-C.      Sent in Rx for Amoxicillin and explained how to take prior to her dental visit.

## 2024-10-30 NOTE — TELEPHONE ENCOUNTER
Patient called with 3 questions:     Can she get the flu shot.  I told her that she could get this.    2.  She needs to schedule dental implant, all of pre-work/drilling done, just the implant part.  Does she require SBE prophylaxis?  ICD placed 9/26/24. She has no med allergies.    3.  Ok for patient to wear a Smart Watch (Tufin) with her ICD?

## 2024-10-30 NOTE — TELEPHONE ENCOUNTER
Agree patient can get the flu shot at current time.    Since her device is so fresh I would be cautious and say that yes she does need antibiotic prophylaxis (amoxicillin 2g 30-60 minutes prior to procedure).     Yes, she can wear her smart Watch.

## 2024-10-31 RX ORDER — AMOXICILLIN 500 MG/1
CAPSULE ORAL
Qty: 4 CAPSULE | Refills: 0 | Status: SHIPPED | OUTPATIENT
Start: 2024-10-31 | End: 2025-02-24

## 2024-11-12 DIAGNOSIS — K21.00 GASTROESOPHAGEAL REFLUX DISEASE WITH ESOPHAGITIS WITHOUT HEMORRHAGE: ICD-10-CM

## 2024-11-12 RX ORDER — PANTOPRAZOLE SODIUM 40 MG/1
TABLET, DELAYED RELEASE ORAL
Qty: 90 TABLET | Refills: 1 | Status: SHIPPED | OUTPATIENT
Start: 2024-11-12

## 2024-11-15 ENCOUNTER — TELEPHONE (OUTPATIENT)
Age: 79
End: 2024-11-15

## 2024-11-15 NOTE — TELEPHONE ENCOUNTER
Caller: Noble Chou    Doctor: TADEO Ledesma    Reason for call: pt was doing cardiac rehab as ordered for 36 weeks. She did about 20 weeks and then had to stop because she had a pacemaker/defib implanted. She has waited the appropriate amount of time and now would  like to restart her cardiac rehab, but is being told she needs an order from Dr. Ledesma. Please advise pt.    Call back#: [phone]

## 2024-11-15 NOTE — TELEPHONE ENCOUNTER
Dr. Ledesma - the pt has weeks remaining and most likely needs a letter clearing her to resume, not a new order. I can double check that with cardiac rehab. As she recently had a PPM implanted, would this be addressed by you or Dr. Posey? I can also check w/ him to see if there are any specific rehab exercises she should refrain from.

## 2024-11-19 DIAGNOSIS — I50.20 HFREF (HEART FAILURE WITH REDUCED EJECTION FRACTION) (HCC): ICD-10-CM

## 2024-11-19 DIAGNOSIS — I42.8 NONISCHEMIC CARDIOMYOPATHY (HCC): ICD-10-CM

## 2024-11-19 DIAGNOSIS — Z95.810 AICD (AUTOMATIC CARDIOVERTER/DEFIBRILLATOR) PRESENT: ICD-10-CM

## 2024-11-19 DIAGNOSIS — I42.8 NONISCHEMIC CARDIOMYOPATHY (HCC): Primary | ICD-10-CM

## 2024-11-19 DIAGNOSIS — I10 PRIMARY HYPERTENSION: ICD-10-CM

## 2024-11-19 DIAGNOSIS — R79.89 ELEVATED TROPONIN LEVEL NOT DUE MYOCARDIAL INFARCTION: ICD-10-CM

## 2024-11-19 NOTE — TELEPHONE ENCOUNTER
Called, spoke to Cardiac Rehab. The will not need a letter, just a renewal of the pt's Cardiac Rehab orders and pt will be able to restart her sessions.   Orders placed.

## 2024-11-20 RX ORDER — SACUBITRIL AND VALSARTAN 24; 26 MG/1; MG/1
1 TABLET, FILM COATED ORAL 2 TIMES DAILY
Qty: 60 TABLET | Refills: 5 | Status: SHIPPED | OUTPATIENT
Start: 2024-11-20

## 2024-11-25 ENCOUNTER — CLINICAL SUPPORT (OUTPATIENT)
Dept: CARDIAC REHAB | Facility: HOSPITAL | Age: 79
End: 2024-11-25
Attending: INTERNAL MEDICINE
Payer: COMMERCIAL

## 2024-11-25 DIAGNOSIS — I42.8 NONISCHEMIC CARDIOMYOPATHY (HCC): ICD-10-CM

## 2024-11-25 DIAGNOSIS — I50.20 HFREF (HEART FAILURE WITH REDUCED EJECTION FRACTION) (HCC): Primary | ICD-10-CM

## 2024-11-25 DIAGNOSIS — I10 PRIMARY HYPERTENSION: ICD-10-CM

## 2024-11-25 PROCEDURE — 93798 PHYS/QHP OP CAR RHAB W/ECG: CPT

## 2024-11-26 RX ORDER — METOPROLOL SUCCINATE 50 MG/1
50 TABLET, EXTENDED RELEASE ORAL EVERY 12 HOURS
Qty: 180 TABLET | Refills: 1 | Status: SHIPPED | OUTPATIENT
Start: 2024-11-26

## 2024-11-27 ENCOUNTER — CLINICAL SUPPORT (OUTPATIENT)
Dept: CARDIAC REHAB | Facility: HOSPITAL | Age: 79
End: 2024-11-27
Attending: INTERNAL MEDICINE
Payer: COMMERCIAL

## 2024-11-27 DIAGNOSIS — I50.20 HFREF (HEART FAILURE WITH REDUCED EJECTION FRACTION) (HCC): Primary | ICD-10-CM

## 2024-11-27 PROCEDURE — 93798 PHYS/QHP OP CAR RHAB W/ECG: CPT

## 2024-11-29 ENCOUNTER — CLINICAL SUPPORT (OUTPATIENT)
Dept: CARDIAC REHAB | Facility: HOSPITAL | Age: 79
End: 2024-11-29
Attending: INTERNAL MEDICINE
Payer: COMMERCIAL

## 2024-11-29 DIAGNOSIS — I50.20 HFREF (HEART FAILURE WITH REDUCED EJECTION FRACTION) (HCC): Primary | ICD-10-CM

## 2024-11-29 PROCEDURE — 93798 PHYS/QHP OP CAR RHAB W/ECG: CPT

## 2024-12-02 ENCOUNTER — CLINICAL SUPPORT (OUTPATIENT)
Dept: CARDIAC REHAB | Facility: HOSPITAL | Age: 79
End: 2024-12-02
Attending: INTERNAL MEDICINE
Payer: COMMERCIAL

## 2024-12-02 DIAGNOSIS — I50.20 HFREF (HEART FAILURE WITH REDUCED EJECTION FRACTION) (HCC): Primary | ICD-10-CM

## 2024-12-02 PROCEDURE — 93798 PHYS/QHP OP CAR RHAB W/ECG: CPT

## 2024-12-03 ENCOUNTER — APPOINTMENT (OUTPATIENT)
Dept: CARDIAC REHAB | Facility: HOSPITAL | Age: 79
End: 2024-12-03
Attending: INTERNAL MEDICINE
Payer: COMMERCIAL

## 2024-12-04 ENCOUNTER — CLINICAL SUPPORT (OUTPATIENT)
Dept: CARDIAC REHAB | Facility: HOSPITAL | Age: 79
End: 2024-12-04
Attending: INTERNAL MEDICINE
Payer: COMMERCIAL

## 2024-12-04 DIAGNOSIS — I50.20 HFREF (HEART FAILURE WITH REDUCED EJECTION FRACTION) (HCC): Primary | ICD-10-CM

## 2024-12-04 PROCEDURE — 93798 PHYS/QHP OP CAR RHAB W/ECG: CPT

## 2024-12-05 ENCOUNTER — APPOINTMENT (OUTPATIENT)
Dept: CARDIAC REHAB | Facility: HOSPITAL | Age: 79
End: 2024-12-05
Attending: INTERNAL MEDICINE
Payer: COMMERCIAL

## 2024-12-06 ENCOUNTER — CLINICAL SUPPORT (OUTPATIENT)
Dept: CARDIAC REHAB | Facility: HOSPITAL | Age: 79
End: 2024-12-06
Attending: INTERNAL MEDICINE
Payer: COMMERCIAL

## 2024-12-06 DIAGNOSIS — I50.20 HFREF (HEART FAILURE WITH REDUCED EJECTION FRACTION) (HCC): Primary | ICD-10-CM

## 2024-12-06 PROCEDURE — 93798 PHYS/QHP OP CAR RHAB W/ECG: CPT

## 2024-12-09 ENCOUNTER — CLINICAL SUPPORT (OUTPATIENT)
Dept: CARDIAC REHAB | Facility: HOSPITAL | Age: 79
End: 2024-12-09
Attending: INTERNAL MEDICINE
Payer: COMMERCIAL

## 2024-12-09 DIAGNOSIS — I50.20 HFREF (HEART FAILURE WITH REDUCED EJECTION FRACTION) (HCC): Primary | ICD-10-CM

## 2024-12-09 PROCEDURE — 93798 PHYS/QHP OP CAR RHAB W/ECG: CPT

## 2024-12-10 ENCOUNTER — APPOINTMENT (OUTPATIENT)
Dept: CARDIAC REHAB | Facility: HOSPITAL | Age: 79
End: 2024-12-10
Attending: INTERNAL MEDICINE
Payer: COMMERCIAL

## 2024-12-11 ENCOUNTER — CLINICAL SUPPORT (OUTPATIENT)
Dept: CARDIAC REHAB | Facility: HOSPITAL | Age: 79
End: 2024-12-11
Attending: INTERNAL MEDICINE
Payer: COMMERCIAL

## 2024-12-11 DIAGNOSIS — I50.20 HFREF (HEART FAILURE WITH REDUCED EJECTION FRACTION) (HCC): Primary | ICD-10-CM

## 2024-12-11 PROCEDURE — 93798 PHYS/QHP OP CAR RHAB W/ECG: CPT

## 2024-12-12 ENCOUNTER — APPOINTMENT (OUTPATIENT)
Dept: CARDIAC REHAB | Facility: HOSPITAL | Age: 79
End: 2024-12-12
Attending: INTERNAL MEDICINE
Payer: COMMERCIAL

## 2024-12-13 ENCOUNTER — CLINICAL SUPPORT (OUTPATIENT)
Dept: CARDIAC REHAB | Facility: HOSPITAL | Age: 79
End: 2024-12-13
Attending: INTERNAL MEDICINE
Payer: COMMERCIAL

## 2024-12-13 DIAGNOSIS — I50.20 HFREF (HEART FAILURE WITH REDUCED EJECTION FRACTION) (HCC): ICD-10-CM

## 2024-12-13 DIAGNOSIS — I50.20 SYSTOLIC HEART FAILURE, UNSPECIFIED HF CHRONICITY (HCC): Primary | ICD-10-CM

## 2024-12-13 PROCEDURE — 93798 PHYS/QHP OP CAR RHAB W/ECG: CPT

## 2024-12-16 ENCOUNTER — OFFICE VISIT (OUTPATIENT)
Dept: CARDIOLOGY CLINIC | Facility: CLINIC | Age: 79
End: 2024-12-16
Payer: COMMERCIAL

## 2024-12-16 ENCOUNTER — CLINICAL SUPPORT (OUTPATIENT)
Dept: CARDIAC REHAB | Facility: HOSPITAL | Age: 79
End: 2024-12-16
Attending: INTERNAL MEDICINE
Payer: COMMERCIAL

## 2024-12-16 VITALS
SYSTOLIC BLOOD PRESSURE: 152 MMHG | WEIGHT: 129 LBS | BODY MASS INDEX: 23.74 KG/M2 | HEIGHT: 62 IN | DIASTOLIC BLOOD PRESSURE: 60 MMHG | HEART RATE: 62 BPM

## 2024-12-16 DIAGNOSIS — I10 PRIMARY HYPERTENSION: ICD-10-CM

## 2024-12-16 DIAGNOSIS — I42.8 NONISCHEMIC CARDIOMYOPATHY (HCC): Primary | ICD-10-CM

## 2024-12-16 DIAGNOSIS — I44.7 LBBB (LEFT BUNDLE BRANCH BLOCK): ICD-10-CM

## 2024-12-16 DIAGNOSIS — I47.29 NSVT (NONSUSTAINED VENTRICULAR TACHYCARDIA) (HCC): ICD-10-CM

## 2024-12-16 DIAGNOSIS — I50.20 SYSTOLIC HEART FAILURE, UNSPECIFIED HF CHRONICITY (HCC): Primary | ICD-10-CM

## 2024-12-16 DIAGNOSIS — I50.20 HFREF (HEART FAILURE WITH REDUCED EJECTION FRACTION) (HCC): ICD-10-CM

## 2024-12-16 PROCEDURE — 99214 OFFICE O/P EST MOD 30 MIN: CPT | Performed by: STUDENT IN AN ORGANIZED HEALTH CARE EDUCATION/TRAINING PROGRAM

## 2024-12-16 PROCEDURE — 93000 ELECTROCARDIOGRAM COMPLETE: CPT | Performed by: STUDENT IN AN ORGANIZED HEALTH CARE EDUCATION/TRAINING PROGRAM

## 2024-12-16 PROCEDURE — 93798 PHYS/QHP OP CAR RHAB W/ECG: CPT

## 2024-12-16 NOTE — PROGRESS NOTES
HEART AND VASCULAR  CARDIAC ELECTROPHYSIOLOGY   HEART RHYTHM CENTER  [facility]    Outpatient New Consult for evaluation for ICD implant   Today's Date: 12/16/24        Patient name: Noble Chou  YOB: 1945  Sex: female         Chief Complaint: as above      ASSESSMENT:  Problem List Items Addressed This Visit       LBBB (left bundle branch block)    Primary hypertension    NSVT (nonsustained ventricular tachycardia) ([facility])    Nonischemic cardiomyopathy ([facility]) - Primary    HFrEF (heart failure with reduced ejection fraction) ([facility])               PLAN:  Nonischemic cardiomyopathy, EF less than 30 despite GDMT  -Continue aspirin 81 mg daily  -Continue metoprolol succinate 50 mg every 12 hours  -Continue Entresto 24-26 mg twice daily  -s/p dual chamber ICD implant 9/26/24    History of NSVT  -At time of initial diagnosis was having NSVT  -Amiodarone discontinued  -No VT noted on device  -Continued routine device checks    Status post ICD implant, dual-chamber  -Device functioning appropriately  -Continue routine checks    Hypercholesterolemia  -Continue pravastatin 40 mg daily    Primary hypertension  -Continue metoprolol succinate 50 mg every 12 hours  -Continue Entresto 24-26 mg tablets twice daily      Follow up in: PRN    No orders of the defined types were placed in this encounter.    There are no discontinued medications.        .............................................................................................    HPI/Subjective:     Ms. 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  She was referred to electrophysiology for evaluation for ICD/CRTD.     She was seen in outpatient electrophysiology clinic on , she noted she feels well. She was  accompanied by her . In terms of symptoms, she denied chest pain, palpitations, shortness of breath, dyspnea on exertion, dizziness, lightheadedness.     We first discussed the indications for ICD.  We reviewed the information found on the shared decision-making tool.  We reviewed that when patients have a low ejection fraction or high risk for arrhythmias.  We discussed ICD implant including procedural technique and recovery.    Otherwise, much of the discussion was held in an attempt to discern her functional status.  I posed the questions of activity in multiple ways, however, she denies any limitations in activity and states she feels well.  She is able to perform cardiac rehab activities without limitation.  While the METs are listed as low on these activities, she states once again she has no symptoms.  She is able to work around the house using a leaf blower and was able to walk up 2 flights of stairs without any issues.  After questioning, she appears to be solidly in the NYHA class I category.  This means that she is not a candidate for CRT-D therapy at this time despite her left bundle branch block.    In the end, she agreed to proceed with ICD implant. Due to a history of bradycardia, she underwent dual chamber pacemaker implant on 9/26/24.     Today she presents in follow up.  She note that she continues to do well from a symptom perspective.  She does cardio up to 3 times per week without symptoms.  Her device was interrogated in office revealing a pacing at 87.2% of the time.  There has been no VT or SVT noted.  Battery life is 10.4 years.  The incision itself is well-healed.  She denies chest pain, palpitation, shortness breath, dizziness, headedness, syncope, near syncope.    At today's visit, her blood pressure is a bit elevated at 152/60.  She states that this is outside the normal measures that she gets at home.  She will continue to monitor blood pressures at home, and address with her PCP  should they be elevated at home.    Given her stability, we will see her on a as needed basis going forward.          Complete 12 point ROS reviewed and otherwise non pertinent or negative except as per HPI pertinent positives in Cardiovascular and Respiratory emphasized. Please see paper chart for outpatient clinic patients where the patient completed the 12 point ROS survey.           Past Medical History:   Diagnosis Date    Edmondson esophagus     Bundle-branch block     Cervical spine fracture (HCC)     Colon polyp     GERD (gastroesophageal reflux disease)     Hypertension     Post-nasal drip     causing cough    Pure hypercholesterolemia 07/01/2019    s/p Medtronic dual chamber ICD 9/26/2024 (primary prevention) 09/26/2024       No Known Allergies  I reviewed the Home Medication list and Allergies in the chart.   Scheduled Meds:  Current Outpatient Medications   Medication Sig Dispense Refill    amoxicillin (AMOXIL) 500 mg capsule Take 4 capsules (total 2,000mg) one hour prior to dental visit. 4 capsule 0    aspirin 81 mg chewable tablet Chew 1 tablet (81 mg total) daily 30 tablet 0    co-enzyme Q-10 30 MG capsule Take 30 mg by mouth 3 (three) times a day      Cranberry 300 MG tablet Take 300 mg by mouth 2 (two) times a day      denosumab (PROLIA) 60 mg/mL Inject 60 mg under the skin every 6 (six) months      metoprolol succinate (TOPROL-XL) 50 mg 24 hr tablet TAKE 1 TABLET BY MOUTH EVERY 12 HOURS 180 tablet 1    Multiple Vitamin (multivitamin) tablet Take 1 tablet by mouth daily      pantoprazole (PROTONIX) 40 mg tablet TAKE 1 TABLET BY MOUTH EVERY DAY 90 tablet 1    pravastatin (PRAVACHOL) 40 mg tablet Take 1 tablet (40 mg total) by mouth daily 30 tablet 0    sacubitril-valsartan (Entresto) 24-26 MG TABS TAKE 1 TABLET BY MOUTH TWICE A DAY 60 tablet 5    TURMERIC PO Take by mouth       No current facility-administered medications for this visit.     PRN Meds:.        Family History   Problem Relation Age of  Onset    Colon polyps Sister     Colon cancer Neg Hx        Social History     Socioeconomic History    Marital status: /Civil Union     Spouse name: Not on file    Number of children: Not on file    Years of education: Not on file    Highest education level: Not on file   Occupational History    Not on file   Tobacco Use    Smoking status: Never     Passive exposure: Never    Smokeless tobacco: Never   Vaping Use    Vaping status: Never Used   Substance and Sexual Activity    Alcohol use: Never    Drug use: Never    Sexual activity: Not on file   Other Topics Concern    Not on file   Social History Narrative    Not on file     Social Drivers of Health     Financial Resource Strain: Not on file   Food Insecurity: No Food Insecurity (5/29/2024)    Nursing - Inadequate Food Risk Classification     Worried About Running Out of Food in the Last Year: Never true     Ran Out of Food in the Last Year: Never true     Ran Out of Food in the Last Year: Not on file   Transportation Needs: No Transportation Needs (5/29/2024)    PRAPARE - Transportation     Lack of Transportation (Medical): No     Lack of Transportation (Non-Medical): No   Physical Activity: Not on file   Stress: Not on file   Social Connections: Not on file   Intimate Partner Violence: Not on file   Housing Stability: Unknown (5/29/2024)    Housing Stability Vital Sign     Unable to Pay for Housing in the Last Year: No     Number of Times Moved in the Last Year: Not on file     Homeless in the Last Year: No         OBJECTIVE:    There were no vitals taken for this visit.  There were no vitals filed for this visit.    GEN: No acute distress, Alert and oriented, well appearing  HEENT Normocephalic, atraumatic   CARDIOVASCULAR:  RRR, No murmur, rub, gallops S1,S2  LUNGS: Clear To auscultation bilaterally, normal effort, no rales, rhonchi, crackles   ABDOMEN:  nondistended,  without obvious organomegaly or ascites  EXTREMITIES/VASCULAR:  No edema. warm an  "well perfused.  PSYCH: Normal Affect,  linear speech pattern without evidence of psychosis.   NEURO: Grossly intact, moving all extremiteis equal, face symmetric, alert and responsive, no obvious focal defecits   GAIT:  Ambulates normally without difficulty  HEME: No bleeding, bruising, petechia, purpura   SKIN: No significant rashes on visibile skin, warm, no diaphoresis or pallor.     Lab Results:       LABS:      Chemistry        Component Value Date/Time    K 4.6 09/26/2024 0921    K 4.5 09/18/2024 0826     09/26/2024 0921     (H) 09/18/2024 0826    CO2 26 09/26/2024 0921    CO2 24 09/18/2024 0826    BUN 30 (H) 09/26/2024 0921    BUN 26 09/18/2024 0826    CREATININE 1.12 09/26/2024 0921        Component Value Date/Time    CALCIUM 9.0 09/26/2024 0921    ALKPHOS 72 06/02/2024 0632    AST 20 09/18/2024 0826    ALT 14 09/18/2024 0826            No results found for: \"CHOL\"  Lab Results   Component Value Date    HDL 49 (L) 05/28/2024     Lab Results   Component Value Date    LDLCALC 71 05/28/2024     Lab Results   Component Value Date    TRIG 116 05/28/2024     No results found for: \"CHOLHDL\"    IMAGING: Echo complete w/ contrast if indicated    Result Date: 9/6/2024  Narrative:   Left Ventricle: Left ventricular cavity size is mildly dilated. Wall thickness is mildly increased. The left ventricular ejection fraction is 30%. Systolic function is severely reduced. There is severe global hypokinesis with regional variation. Diastolic function is moderately abnormal, consistent with grade II (pseudonormal) relaxation.   Right Ventricle: Right ventricular cavity size is normal. Systolic function is normal.   Left Atrium: The atrium is mildly dilated.   Aortic Valve: There is mild regurgitation.   Mitral Valve: There is mild annular calcification. There is moderate regurgitation.   Tricuspid Valve: There is mild regurgitation. The right ventricular systolic pressure is mildly elevated.        Cardiac testing: "         I reviewed and interpreted the following LABS/EKG/TELE/IMAGING and below is summary of my interpretation (if data available):          Current EKG performed at today's visit and read by me personally reveals atrial paced rhythm, occasional PVCs, left bundle branch block.    ECHO 9/6/24    Left Ventricle: Left ventricular cavity size is mildly dilated. Wall thickness is mildly increased. The left ventricular ejection fraction is 30%. Systolic function is severely reduced. There is severe global hypokinesis with regional variation. Diastolic function is moderately abnormal, consistent with grade II (pseudonormal) relaxation.    Right Ventricle: Right ventricular cavity size is normal. Systolic function is normal.    Left Atrium: The atrium is mildly dilated.    Aortic Valve: There is mild regurgitation.    Mitral Valve: There is mild annular calcification. There is moderate regurgitation.    Tricuspid Valve: There is mild regurgitation. The right ventricular systolic pressure is mildly elevated.

## 2024-12-17 ENCOUNTER — APPOINTMENT (OUTPATIENT)
Dept: CARDIAC REHAB | Facility: HOSPITAL | Age: 79
End: 2024-12-17
Attending: INTERNAL MEDICINE
Payer: COMMERCIAL

## 2024-12-18 ENCOUNTER — CLINICAL SUPPORT (OUTPATIENT)
Dept: CARDIAC REHAB | Facility: HOSPITAL | Age: 79
End: 2024-12-18
Attending: INTERNAL MEDICINE
Payer: COMMERCIAL

## 2024-12-18 DIAGNOSIS — I50.20 SYSTOLIC HEART FAILURE, UNSPECIFIED HF CHRONICITY (HCC): Primary | ICD-10-CM

## 2024-12-18 PROCEDURE — 93798 PHYS/QHP OP CAR RHAB W/ECG: CPT

## 2024-12-19 ENCOUNTER — APPOINTMENT (OUTPATIENT)
Dept: CARDIAC REHAB | Facility: HOSPITAL | Age: 79
End: 2024-12-19
Attending: INTERNAL MEDICINE
Payer: COMMERCIAL

## 2024-12-20 ENCOUNTER — CLINICAL SUPPORT (OUTPATIENT)
Dept: CARDIAC REHAB | Facility: HOSPITAL | Age: 79
End: 2024-12-20
Attending: INTERNAL MEDICINE
Payer: COMMERCIAL

## 2024-12-20 DIAGNOSIS — I50.20 HFREF (HEART FAILURE WITH REDUCED EJECTION FRACTION) (HCC): Primary | ICD-10-CM

## 2024-12-20 PROCEDURE — 93798 PHYS/QHP OP CAR RHAB W/ECG: CPT

## 2024-12-23 ENCOUNTER — CLINICAL SUPPORT (OUTPATIENT)
Dept: CARDIAC REHAB | Facility: HOSPITAL | Age: 79
End: 2024-12-23
Attending: INTERNAL MEDICINE
Payer: COMMERCIAL

## 2024-12-23 DIAGNOSIS — I50.20 HFREF (HEART FAILURE WITH REDUCED EJECTION FRACTION) (HCC): Primary | ICD-10-CM

## 2024-12-23 PROCEDURE — 93798 PHYS/QHP OP CAR RHAB W/ECG: CPT
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
## 2024-12-24 ENCOUNTER — APPOINTMENT (OUTPATIENT)
Dept: CARDIAC REHAB | Facility: HOSPITAL | Age: 79
End: 2024-12-24
Attending: INTERNAL MEDICINE
Payer: COMMERCIAL

## 2024-12-26 ENCOUNTER — APPOINTMENT (OUTPATIENT)
Dept: CARDIAC REHAB | Facility: HOSPITAL | Age: 79
End: 2024-12-26
Attending: INTERNAL MEDICINE
Payer: COMMERCIAL

## 2024-12-27 ENCOUNTER — CLINICAL SUPPORT (OUTPATIENT)
Dept: CARDIAC REHAB | Facility: HOSPITAL | Age: 79
End: 2024-12-27
Attending: INTERNAL MEDICINE
Payer: COMMERCIAL

## 2024-12-27 DIAGNOSIS — I50.20 HFREF (HEART FAILURE WITH REDUCED EJECTION FRACTION) (HCC): Primary | ICD-10-CM

## 2024-12-27 PROCEDURE — 93798 PHYS/QHP OP CAR RHAB W/ECG: CPT

## 2024-12-30 ENCOUNTER — CLINICAL SUPPORT (OUTPATIENT)
Dept: CARDIAC REHAB | Facility: HOSPITAL | Age: 79
End: 2024-12-30
Attending: INTERNAL MEDICINE
Payer: COMMERCIAL

## 2024-12-30 DIAGNOSIS — I50.20 HFREF (HEART FAILURE WITH REDUCED EJECTION FRACTION) (HCC): Primary | ICD-10-CM

## 2024-12-30 PROCEDURE — 93798 PHYS/QHP OP CAR RHAB W/ECG: CPT
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
## 2024-12-31 ENCOUNTER — APPOINTMENT (OUTPATIENT)
Dept: CARDIAC REHAB | Facility: HOSPITAL | Age: 79
End: 2024-12-31
Attending: INTERNAL MEDICINE
Payer: COMMERCIAL

## 2025-01-09 ENCOUNTER — TELEPHONE (OUTPATIENT)
Dept: CARDIOLOGY CLINIC | Facility: CLINIC | Age: 80
End: 2025-01-09

## 2025-01-09 NOTE — TELEPHONE ENCOUNTER
Patient called wondering if it is okay for her to swim and perform normally, since she had a pacer implant.

## 2025-01-22 ENCOUNTER — RESULTS FOLLOW-UP (OUTPATIENT)
Dept: CARDIOLOGY CLINIC | Facility: CLINIC | Age: 80
End: 2025-01-22

## 2025-01-22 ENCOUNTER — IN-CLINIC DEVICE VISIT (OUTPATIENT)
Dept: CARDIOLOGY CLINIC | Facility: CLINIC | Age: 80
End: 2025-01-22
Payer: COMMERCIAL

## 2025-01-22 DIAGNOSIS — Z95.810 AICD (AUTOMATIC CARDIOVERTER/DEFIBRILLATOR) PRESENT: Primary | ICD-10-CM

## 2025-01-22 PROCEDURE — 93283 PRGRMG EVAL IMPLANTABLE DFB: CPT | Performed by: INTERNAL MEDICINE

## 2025-01-22 NOTE — PROGRESS NOTES
Results for orders placed or performed in visit on 01/22/25   Cardiac EP device report    Narrative    MDT DUAL ICD/ ACTIVE SYSTEM IS MRI CONDITIONAL  DEVICE INTERROGATED IN THE [place] OFFICE: BATTERY VOLTAGE ADEQUATE-11 YRS. AP 79%  0%. ALL AVAILABLE LEAD PARAMETERS WITHIN NORMAL LIMITS. NO SIGNIFICANT HIGH RATE EPISODES. NO PROGRAMMING CHANGES MADE TO DEVICE PARAMETERS. NORMAL DEVICE FUNCTION. NC

## 2025-03-31 ENCOUNTER — OFFICE VISIT (OUTPATIENT)
Dept: CARDIOLOGY CLINIC | Facility: CLINIC | Age: 80
End: 2025-03-31
Payer: COMMERCIAL

## 2025-03-31 VITALS
HEIGHT: 63 IN | BODY MASS INDEX: 23.74 KG/M2 | SYSTOLIC BLOOD PRESSURE: 126 MMHG | DIASTOLIC BLOOD PRESSURE: 66 MMHG | WEIGHT: 134 LBS | HEART RATE: 60 BPM

## 2025-03-31 DIAGNOSIS — I50.20 HFREF (HEART FAILURE WITH REDUCED EJECTION FRACTION) (HCC): Primary | ICD-10-CM

## 2025-03-31 DIAGNOSIS — I42.8 NONISCHEMIC CARDIOMYOPATHY (HCC): ICD-10-CM

## 2025-03-31 DIAGNOSIS — I44.7 LBBB (LEFT BUNDLE BRANCH BLOCK): ICD-10-CM

## 2025-03-31 DIAGNOSIS — I47.29 NSVT (NONSUSTAINED VENTRICULAR TACHYCARDIA) (HCC): ICD-10-CM

## 2025-03-31 DIAGNOSIS — Z95.810 S/P ICD (INTERNAL CARDIAC DEFIBRILLATOR) PROCEDURE: ICD-10-CM

## 2025-03-31 PROCEDURE — 99214 OFFICE O/P EST MOD 30 MIN: CPT | Performed by: INTERNAL MEDICINE

## 2025-03-31 NOTE — PROGRESS NOTES
Cardiology Follow Up    Noble Chou  1945  [id number]  [facility] [place]  [address] [place]  [address] [id number] [phone]    1. Nonischemic cardiomyopathy (HCC)        2. HFrEF (heart failure with reduced ejection fraction) (HCC)        3. NSVT (nonsustained ventricular tachycardia) (HCC)        4. LBBB (left bundle branch block)        5. s/p Medtronic dual chamber ICD 9/26/2024 (primary prevention)              Discussion/Summary:    Nonischemic cardiomyopathy and chronic heart failure with reduced ejection fraction - Noble was found to have a dilated cardiomyopathy during a hospitalization for pneumonia which was worked up once she was found to have a significantly elevated troponin level.  At first this appeared either stress-induced or myocarditis.  This now appears to be myocarditis with a persistently reduced ejection fraction.  Her repeat echocardiogram continued to show an EF of 30%.  She is a stage C and now appears to be in NYHA class I, as she is not conveying any symptoms to me to the level she achieves.  She will remain on the same dose of Entresto and Toprol-XL.  She has not required loop diuretic therapy.  She knows to follow a low-sodium diet but I have asked her to keep well-hydrated, given her mild lightheadedness upon standing.  She will be back to see us in 6 months.    Nonsustained VT - During her hospitalization she was found to have runs of nonsustained VT in which she was placed on amiodarone.  As stated she is now status post ICD and is off of amiodarone.  Continue beta-blocker.  We will continue to follow device interrogations.      Interval History:     Mrs. Chou comes in for follow-up given her nonischemic cardiomyopathy and chronic heart failure.  Noble was hospitalized Memorial Day weekend with pneumonia.  Her symptoms included shortness of breath and worsening cough.  In the setting of  this she was found to have a significantly elevated troponin which led to a cardiac workup.  She had an echocardiogram that showed a dilated LV with severely to systolic function at 30%.  She had a cardiac catheterization that showed no significant obstructive CAD.  She has a known prior history of a chronic left bundle branch block in which she followed with Dr. Oglesby.  She does not recall ever having an echocardiogram as an outpatient during that time.    During her hospitalization she was also having runs of nonsustained VT.  Amiodarone was added and her goal-directed medical therapy including Toprol-XL 50 mg twice a day and Entresto 24/26 twice a day.  She was wearing a LifeVest.  We felt that the time that the cardiomyopathy was either stress-induced or myocarditis.  At her 3-month follow-up and just prior to this visit she had an echocardiogram that shows the same reduction in ejection fraction at 30% and therefore had not changed.  At that point she was referred to electrophysiology and is now status post ICD for primary prevention.  Her first few device checks have been normal.  She did come off amiodarone.    Noble has clinically improved very well after recovering from her hospitalization.  Initially she was quite fatigued and was having some on and off lower extremity edema, but this all resolved.  She has gone through PT/OT and completed cardiac rehabilitation.  To the level she achieves she does not convey any symptoms to me.  She denies shortness of breath, orthopnea or PND.  No edema at this point.  She denies chest pain or any symptoms of angina.  She has noticed some mild lightheadedness especially at [employer] when she stands up quickly, and admits to being dehydrated during that time.  No near-syncope or syncope.  She denies palpitations.      Patient Active Problem List   Diagnosis    History of total right hip replacement    Right upper quadrant abdominal pain    Edmondson's esophagus without  dysplasia    Constipation    Pure hypercholesterolemia    Gastroesophageal reflux disease with esophagitis without hemorrhage    History of colon polyps    LBBB (left bundle branch block)    Elevated troponin level not due myocardial infarction    Abnormal CT scan    Compression deformity of vertebra    Primary hypertension    NSVT (nonsustained ventricular tachycardia) (HCC)    Nonischemic cardiomyopathy (HCC)    HFrEF (heart failure with reduced ejection fraction) ([facility])    s/p Medtronic dual chamber ICD 9/26/2024 (primary prevention)     Past Medical History:   Diagnosis Date    Edmondson esophagus     Bundle-branch block     Cervical spine fracture (HCC)     Colon polyp     GERD (gastroesophageal reflux disease)     Hypertension     Post-nasal drip     causing cough    Pure hypercholesterolemia 07/01/2019    s/p Medtronic dual chamber ICD 9/26/2024 (primary prevention) 09/26/2024     Social History     Socioeconomic History    Marital status: /Civil Union     Spouse name: Not on file    Number of children: Not on file    Years of education: Not on file    Highest education level: Not on file   Occupational History    Not on file   Tobacco Use    Smoking status: Never     Passive exposure: Never    Smokeless tobacco: Never   Vaping Use    Vaping status: Never Used   Substance and Sexual Activity    Alcohol use: Never    Drug use: Never    Sexual activity: Not on file   Other Topics Concern    Not on file   Social History Narrative    Not on file     Social Drivers of Health     Financial Resource Strain: Not on file   Food Insecurity: No Food Insecurity (5/29/2024)    Nursing - Inadequate Food Risk Classification     Worried About Running Out of Food in the Last Year: Never true     Ran Out of Food in the Last Year: Never true     Ran Out of Food in the Last Year: Not on file   Transportation Needs: No Transportation Needs (5/29/2024)    PRAPARE - Transportation     Lack of Transportation (Medical): No      Lack of Transportation (Non-Medical): No   Physical Activity: Not on file   Stress: Not on file   Social Connections: Not on file   Intimate Partner Violence: Not on file   Housing Stability: Unknown (5/29/2024)    Housing Stability Vital Sign     Unable to Pay for Housing in the Last Year: No     Number of Times Moved in the Last Year: Not on file     Homeless in the Last Year: No      Family History   Problem Relation Age of Onset    Colon polyps Sister     Colon cancer Neg Hx      Past Surgical History:   Procedure Laterality Date    CARDIAC CATHETERIZATION N/A 5/29/2024    Procedure: Cardiac catheterization;  Surgeon: Nilesh Mullen DO;  Location: [facility];  Service: Cardiology    CARDIAC CATHETERIZATION N/A 5/29/2024    Procedure: Cardiac Coronary Angiogram;  Surgeon: Nilesh Mullen DO;  Location: [facility];  Service: Cardiology    CARDIAC ELECTROPHYSIOLOGY PROCEDURE N/A 9/26/2024    Procedure: Cardiac icd implant;  Surgeon: Trae Posey MD;  Location: [facility];  Service: Cardiology    COLONOSCOPY  09/08/2017    5 yr recall    JOINT REPLACEMENT Right     hip    TIBIAL IM QUIRINO REMOVAL      TOTAL HIP ARTHROPLASTY      UPPER GASTROINTESTINAL ENDOSCOPY  10/18/2018    3 yr recall       Current Outpatient Medications:     aspirin 81 mg chewable tablet, Chew 1 tablet (81 mg total) daily, Disp: 30 tablet, Rfl: 0    co-enzyme Q-10 30 MG capsule, Take 30 mg by mouth 3 (three) times a day, Disp: , Rfl:     Cranberry 300 MG tablet, Take 300 mg by mouth 2 (two) times a day, Disp: , Rfl:     denosumab (PROLIA) 60 mg/mL, Inject 60 mg under the skin every 6 (six) months, Disp: , Rfl:     metoprolol succinate (TOPROL-XL) 50 mg 24 hr tablet, TAKE 1 TABLET BY MOUTH EVERY 12 HOURS, Disp: 180 tablet, Rfl: 1    Multiple Vitamin (multivitamin) tablet, Take 1 tablet by mouth daily, Disp: , Rfl:     pantoprazole (PROTONIX) 40 mg tablet, TAKE 1 TABLET BY MOUTH EVERY DAY, Disp: 90 tablet, Rfl:  1    pravastatin (PRAVACHOL) 40 mg tablet, Take 1 tablet (40 mg total) by mouth daily, Disp: 30 tablet, Rfl: 0    sacubitril-valsartan (Entresto) 24-26 MG TABS, TAKE 1 TABLET BY MOUTH TWICE A DAY, Disp: 60 tablet, Rfl: 5    TURMERIC PO, Take by mouth, Disp: , Rfl:   No Known Allergies    Labs:  Lab Results   Component Value Date    K 4.6 09/26/2024    K 4.5 09/18/2024     09/26/2024     (H) 09/18/2024    CO2 26 09/26/2024    CO2 24 09/18/2024    BUN 30 (H) 09/26/2024    BUN 26 09/18/2024    CREATININE 1.12 09/26/2024    CALCIUM 9.0 09/26/2024     Lab Results   Component Value Date    WBC 7.79 09/26/2024    HGB 12.6 09/26/2024    HCT 39.6 09/26/2024    MCV 87 09/26/2024     09/26/2024     Lab Results   Component Value Date    TRIG 116 05/28/2024    HDL 49 (L) 05/28/2024     Imaging:  ECGs in the past have demonstrated sinus rhythm with a left bundle branch block.    ECHO (9/6/2024):    Left Ventricle: Left ventricular cavity size is mildly dilated. Wall thickness is mildly increased. The left ventricular ejection fraction is 30%. Systolic function is severely reduced. There is severe global hypokinesis with regional variation. Diastolic function is moderately abnormal, consistent with grade II (pseudonormal) relaxation.    Right Ventricle: Right ventricular cavity size is normal. Systolic function is normal.    Left Atrium: The atrium is mildly dilated.    Aortic Valve: There is mild regurgitation.    Mitral Valve: There is mild annular calcification. There is moderate regurgitation.    Tricuspid Valve: There is mild regurgitation. The right ventricular systolic pressure is mildly elevated.      Echo complete w/ contrast if indicated  Result Date: 5/28/2024  Narrative:   Left Ventricle: Left ventricular cavity size is normal. Wall thickness is mildly increased. There is mild concentric hypertrophy. The left ventricular ejection fraction is 30%. Systolic function is severely reduced. Diastolic  function is mildly abnormal, consistent with grade I (abnormal) relaxation.   The following segments are akinetic: basal anteroseptal, mid anteroseptal, mid inferoseptal, mid inferior, apical anterior, apical septal, apical inferior, apical lateral and apex.   The following segments are hypokinetic: basal anterior, basal inferoseptal, basal inferior, basal inferolateral, basal anterolateral, mid anterior, mid inferolateral and mid anterolateral.   Right Ventricle: Right ventricular cavity size is normal. Systolic function is normal.   Left Atrium: The atrium is mildly dilated.   Aortic Valve: There is mild regurgitation.   Mitral Valve: There is mild annular calcification. There is mild regurgitation.   Tricuspid Valve: There is mild regurgitation. The right ventricular systolic pressure is mildly elevated. The estimated right ventricular systolic pressure is 39.00 mmHg.     PE Study with CT abdomen & pelvis with contrast  Result Date: 5/27/2024  Narrative: CT PULMONARY ANGIOGRAM OF THE CHEST AND CT ABDOMEN AND PELVIS WITH INTRAVENOUS CONTRAST INDICATION: SOB, cough, elevated troponin. COMPARISON: None. TECHNIQUE: CT examination of the chest, abdomen and pelvis was performed. Thin section CT angiographic technique was used in the chest in order to evaluate for pulmonary embolus and coronal 3D MIP postprocessing was performed on the acquisition scanner. Multiplanar 2D reformatted images were created from the source data. This examination, like all CT scans performed in the [facility], was performed utilizing techniques to minimize radiation dose exposure, including the use of iterative reconstruction and automated exposure control. Radiation dose length product (DLP) for this visit: 705.06 mGy-cm IV Contrast: 85 mL of iohexol (OMNIPAQUE) Enteric Contrast: Not administered. FINDINGS: CHEST PULMONARY ARTERIAL TREE: No pulmonary embolus is seen. LUNGS: Large consolidation in the right lower lobe. Debris  within the right lower lobe airways. Additional patchy opacities and small amount of endoluminal debris in the lingula and left lower lobe. PLEURA: Small right pleural effusion. HEART/AORTA: Heart is unremarkable for patient's age. No thoracic aortic aneurysm. MEDIASTINUM AND JERE: Mediastinal lymphadenopathy is likely reactive. CHEST WALL AND LOWER NECK: Unremarkable. ABDOMEN LIVER/BILIARY TREE: Unremarkable. GALLBLADDER: No calcified gallstones. No pericholecystic inflammatory change. SPLEEN: Unremarkable. PANCREAS: Unremarkable. ADRENAL GLANDS: Unremarkable. KIDNEYS/URETERS: 1.3 cm left renal lesion measuring above fluid density, indeterminate on single phase exam for hyperdense cyst versus solid neoplasm. No hydronephrosis. STOMACH AND BOWEL: Unremarkable. APPENDIX: No findings to suggest appendicitis. ABDOMINOPELVIC CAVITY: No ascites. No pneumoperitoneum. No lymphadenopathy. VESSELS: Atherosclerosis without abdominal aortic aneurysm. PELVIS REPRODUCTIVE ORGANS: Abnormal appearance of the uterus or vaginal cuff. The uterus has a somewhat diminutive appearance which could be due to age-related atrophy versus the sequela of previous hysterectomy. But in either case there is a 2.4 x 2.1 cm soft  tissue density which is partially surrounded by a rim of fluid as demonstrated on series 3 image 129. This is difficult to interpret in the absence of comparison imaging as well as artifact in the pelvis from the right hip arthroplasty but the presence of a neoplasm cannot be excluded. URINARY BLADDER: Unremarkable. ABDOMINAL WALL/INGUINAL REGIONS: Unremarkable. BONES: Degenerative changes in the spine. Age-indeterminate L3 compression deformity. Grade 1 anterolisthesis of L5 on S1 secondary to bilateral L5 pars defects. Right total hip arthroplasty. Mild degenerative change at the left hip joint.     Impression: 1.  No pulmonary embolism. 2.  Large right lower lobe consolidation and patchy left-sided opacities with  "endoluminal debris, consistent with pneumonia. 3.  Retained debris in the esophagus. Given the lung opacities and endoluminal debris, aspiration should also be considered. 4.  Small right pleural effusion. 5.  Indeterminate 1.3 cm left renal lesion measuring above simple fluid density. Cannot distinguish hyperdense cyst from solid neoplasm on single phase CT. Recommend correlation with any prior outside imaging and otherwise characterization by nonemergent  renal protocol CT or MRI. 6.  Abnormal appearance of the uterus or vaginal cuff. A 2.4 cm soft tissue density in this region surrounded by partial rim of fluid is indeterminate but the presence of a neoplasm cannot be excluded. Correlation is needed with any available prior outside imaging. Recommend further evaluation with nonemergent pelvic ultrasound. 7.  Age-indeterminate L3 compression deformity. Correlate for focal pain. The study was marked in EPIC for immediate notification. Workstation performed: [id number]       Review of Systems:  Review of Systems   Constitutional: Negative.    HENT: Negative.     Eyes: Negative.    Respiratory: Negative.     Cardiovascular: Negative.    Gastrointestinal: Negative.    Endocrine: Negative.    Genitourinary: Negative.    Musculoskeletal: Negative.    Skin: Negative.    Allergic/Immunologic: Negative.    Neurological: Negative.    Hematological: Negative.    Psychiatric/Behavioral: Negative.     All other systems reviewed and are negative.    Vitals:    03/31/25 0919   BP: 126/66   BP Location: Left arm   Patient Position: Sitting   Cuff Size: Standard   Pulse: 60   Weight: 60.8 kg (134 lb)   Height: 5' 2.5\" (1.588 m)     Physical Exam  Vitals and nursing note reviewed.   Constitutional:       Appearance: She is well-developed.   HENT:      Head: Normocephalic and atraumatic.   Eyes:      General: No scleral icterus.        Right eye: No discharge.         Left eye: No discharge.      Pupils: Pupils are equal, round, and " reactive to light.   Neck:      Thyroid: No thyromegaly.      Vascular: No JVD.   Cardiovascular:      Rate and Rhythm: Normal rate and regular rhythm. No extrasystoles are present.     Pulses: Normal pulses. No decreased pulses.      Heart sounds: Normal heart sounds, S1 normal and S2 normal. No murmur heard.     No friction rub. No gallop.   Pulmonary:      Effort: Pulmonary effort is normal. No respiratory distress.      Breath sounds: Normal breath sounds. No wheezing, rhonchi or rales.   Abdominal:      General: Bowel sounds are normal. There is no distension.      Palpations: Abdomen is soft.      Tenderness: There is no abdominal tenderness.   Musculoskeletal:         General: No tenderness or deformity. Normal range of motion.      Cervical back: Normal range of motion and neck supple.      Right lower leg: No edema.      Left lower leg: No edema.   Skin:     General: Skin is warm and dry.      Findings: No rash.   Neurological:      Mental Status: She is alert and oriented to person, place, and time.      Cranial Nerves: No cranial nerve deficit.   Psychiatric:         Thought Content: Thought content normal.         Judgment: Judgment normal.       Counseling / Coordination of Care  Total office time spent today 25 minutes.  Greater than 50% of total time was spent with the patient and / or family counseling and / or coordination of care.

## 2025-03-31 NOTE — PATIENT INSTRUCTIONS
"Patient Education     Low-sodium diet   The Basics   Written by the doctors and editors at [facility]   What is sodium? -- This is the main ingredient in table salt. It is also found in lots of foods. The body needs a very small amount of sodium to work normally, but most people eat much more sodium than their body needs.  Who should eat less sodium? -- Nearly everyone eats too much sodium. The average American takes in 3400 milligrams of sodium each day. Experts say that most people should have no more than 2300 milligrams a day.  Some people with certain health conditions should follow a low-sodium diet. Ask your doctor how much sodium you should have.  Why should I eat less sodium? -- Reducing the amount of sodium you eat can have lots of health benefits:   It can lower your blood pressure. This means that it can help lower your risk of stroke, heart attack, kidney damage, and lots of other health problems.   It can reduce the amount of fluid in your body, which means that your heart doesn't have to work as hard.   It can keep the kidneys from having to work too hard. This is especially important in people who have kidney disease.   It can reduce swelling in the ankles and belly, which can be uncomfortable and make it hard to move.   It can lower the chances of forming kidney stones.   It can help keep your bones strong.  Which foods have the most sodium? -- Processed foods have the most sodium. These foods usually come in cans, boxes, jars, and bags. They tend to have a lot of sodium even if they don't taste salty. In fact, many sweet foods have a lot of sodium in them. The only way to know for sure how much sodium is in a food is to check the label (figure 1).  Here are some examples of foods that often have too much sodium:   Canned soups   Rice and noodle mixes   Sauces, dressings, and condiments (such as ketchup and mustard)   Pre-made frozen meals (also called \"TV dinners\")   Deli meats, hot dogs, and " "cheeses   Smoked, cured, or pickled foods   Salted snack foods and nuts   Restaurant meals  What should I do to reduce the amount of sodium in my diet? -- Many people think that eating a low-sodium diet just means not adding salt to their food. But this is not true. Not adding salt at the table or when cooking will help a little. But almost all of the sodium you eat is already in the food you buy at the grocery store or at restaurants (figure 2).  Here are some tips to help you eat less sodium:   Avoid processed foods when possible. This is the most important thing you can do to eat less sodium. Processed foods include most foods that are sold in cans, boxes, jars, and bags.   Instead of buying pre-made, processed foods, buy fresh or fresh-frozen fruits and vegetables. (\"Fresh-frozen\" means that the food is frozen without anything added to it.)   Buy meats, fish, chicken, and turkey that are fresh instead of canned or sold at the deli counter. (Meats sold at the deli counter are high in sodium.)   Try to eat at restaurants less often.   When possible, try to make meals from scratch at home using fresh ingredients.   If you do buy canned or packaged foods, choose ones that are labeled \"sodium free\" or \"very low sodium\" (table 1). Or choose foods that have less than 400 milligrams of sodium in each serving. The amount of sodium in each serving appears on the nutrition label (figure 1).  The table has some examples of foods to avoid and foods to choose instead (table 2).  Whatever changes you make, make them slowly. Choose 1 thing to do differently, and do that for a while. If you can keep doing that change easily, add another change. For instance, if you usually eat green beans from a can, try buying fresh or fresh-frozen green beans and cooking them at home without adding salt. If that works for you, keep doing it. Then, choose another thing to change.  If you try making a change and it doesn't work right away, don't " "give up. See if you can reduce sodium in other ways. The important thing is to take small steps and to keep doing the changes that work for you.  Can I still eat out at restaurants sometimes? -- One of best ways to limit your sodium is to only eat out at restaurants every once in a while. When you do eat out, try to choose places that offer healthier choices and fresh ingredients.  No matter where you eat, when choosing your food:   Ask your  if your meal can be made without salt.   Avoid foods that come with sauces or dips.   Choose plain grilled meats or fish and steamed vegetables.   Ask for oil and vinegar for your salad, rather than dressing.   If a meal you really want has more sodium than you should have, consider saving half of it to eat another day.  What if food just does not taste as good without sodium? -- Starting a low-sodium diet can be hard. The good news is that your taste buds can get used to having less sodium. But you have to give them some time to adjust.  It can also help to try other ways to add flavor to your foods. Try things like herbs, spices, lemon juice, and vinegar.  What about salt substitutes? -- Flavoring your food with a salt substitute is a good way to reduce how much salt you eat. But check with your doctor or nurse before trying this. Some salt substitutes can be dangerous if you have certain health problems or take certain medicines.  Do medicines have sodium? -- Yes, some medicines contain sodium. If you are buying medicines you can get without a prescription, look to see how much sodium they have. Avoid products that have \"sodium carbonate\" or \"sodium bicarbonate\" unless your doctor prescribes them. (Sodium bicarbonate is baking soda.)  All topics are updated as new evidence becomes available and our peer review process is complete.  This topic retrieved from [employer] on: Mar 22, 2024.  Topic 91300 Version 14.0  Release: 32.2.4 - C32.80  © 2024 [employer] and/or its " "affiliates. All rights reserved.  figure 1: Food labels can be tricky     To figure out how much sodium you are eating, check the label to find out how much sodium is in 1 serving. If you are having more than 1 serving, multiply that amount by the number of servings you plan to eat. For instance, if you are going to eat this whole can of soup, you should multiply 850 by 2. That means that you will be having 1700 milligrams of sodium. That's more sodium than many people are supposed to have in 1 day.  Graphic 08164 Version 8.0  figure 2: Sources of sodium in your diet     Graphic 81379 Version 2.0  table 1: A guide to common nutrient claims and what they mean  Salt/sodium-free  Less than 5 mg of sodium per serving   Very low sodium  35 mg or less of sodium per serving   Low sodium  140 mg or less of sodium per serving   Reduced sodium  At least 25% less sodium than the regular product   Light or lite in sodium  At least 50% less sodium than the regular product   No salt added or unsalted  No salt is added during processing, but these products may not be salt/sodium-free unless stated   mg: milligram; %: percent.  Graphic 48733 Version 7.0  table 2: Ways to cut down on salt (sodium)  Avoid these foods  Try these foods instead    Cured and smoked foods like huitron, sausage, smoked fish and meats, hot dogs, ham, lunch meats, corned beef, and pickles Fresh turkey, chicken, and lean beef   Canned fish (tuna, sardines) Unsalted tuna or sardines   Canned meats Fresh unprocessed meats, vegetable protein, and fish  Frozen and canned meats, vegetable protein, and fish that are labeled \"low-sodium\"   Salted pretzels, crackers, potato chips, tortilla chips, and nuts Low-sodium and unsalted versions of these foods   Most cheeses Low-sodium cheeses (check label for actual sodium content)   Sauces (tomato and cream, etc), tomato juices Low-sodium versions of these foods, such as low-sodium tomato juice   Processed, instant, and " "convenience foods like frozen dinners, packaged meals, canned soups, and boxed pasta blends Cook and freeze your own low-sodium meals, soups, and broths    If you do need to use convenience or processed foods, read the labels. Choose items with 140 to 200 mg of sodium per serving.    For an entire convenience meal (frozen dinner), try to find options with less than 500 to 600 mg of sodium.    If you used canned foods, look for those labeled \"sodium-free.\" Or you can rinse the canned food under water to lower the sodium content.   Fast foods and foods prepared at restaurants (unless without cheese, sauces, or added salt) Fresh foods and foods with sauces on the side  Request that food be prepared without cheese or added salt   Graphic 37793 Version 10.0  Consumer Information Use and Disclaimer   Disclaimer: This generalized information is a limited summary of diagnosis, treatment, and/or medication information. It is not meant to be comprehensive and should be used as a tool to help the user understand and/or assess potential diagnostic and treatment options. It does NOT include all information about conditions, treatments, medications, side effects, or risks that may apply to a specific patient. It is not intended to be medical advice or a substitute for the medical advice, diagnosis, or treatment of a health care provider based on the health care provider's examination and assessment of a patient's specific and unique circumstances. Patients must speak with a health care provider for complete information about their health, medical questions, and treatment options, including any risks or benefits regarding use of medications. This information does not endorse any treatments or medications as safe, effective, or approved for treating a specific patient. [employer] and its affiliates disclaim any warranty or liability relating to this information or the use thereof.The use of this information is governed by the " Terms of Use, available at https://www.woltersMoney-Wizardsuwer.com/en/know/clinical-effectiveness-terms. 2024© [employer] and its affiliates and/or licensors. All rights reserved.  Copyright   © 2024 [employer] and/or its affiliates. All rights reserved.

## 2025-04-25 ENCOUNTER — RESULTS FOLLOW-UP (OUTPATIENT)
Dept: CARDIOLOGY CLINIC | Facility: CLINIC | Age: 80
End: 2025-04-25

## 2025-04-25 ENCOUNTER — REMOTE DEVICE CLINIC VISIT (OUTPATIENT)
Dept: CARDIOLOGY CLINIC | Facility: CLINIC | Age: 80
End: 2025-04-25
Payer: COMMERCIAL

## 2025-04-25 DIAGNOSIS — I42.8 OTHER CARDIOMYOPATHY (HCC): ICD-10-CM

## 2025-04-25 DIAGNOSIS — I47.20 VENTRICULAR TACHYCARDIA (HCC): Primary | ICD-10-CM

## 2025-04-25 PROCEDURE — 93296 REM INTERROG EVL PM/IDS: CPT | Performed by: INTERNAL MEDICINE

## 2025-04-25 PROCEDURE — 93295 DEV INTERROG REMOTE 1/2/MLT: CPT | Performed by: INTERNAL MEDICINE

## 2025-04-25 NOTE — PROGRESS NOTES
Results for orders placed or performed in visit on 04/25/25   Cardiac EP device report    Narrative    MDT DUAL ICD/ ACTIVE SYSTEM IS MRI CONDITIONAL  CARELINK TRANSMISSION: BATTERY VOLTAGE ADEQUATE (11.4 YRS). AP: 71.2%. : <0.1% (MVP-ON). ALL AVAILABLE LEAD PARAMETERS WITHIN NORMAL LIMITS. NO SIGNIFICANT HIGH RATE EPISODES. OPTI-VOL WITHIN NORMAL LIMITS. NORMAL DEVICE FUNCTION. [facility]

## 2025-05-19 DIAGNOSIS — I42.8 NONISCHEMIC CARDIOMYOPATHY (HCC): ICD-10-CM

## 2025-05-19 DIAGNOSIS — I10 PRIMARY HYPERTENSION: ICD-10-CM

## 2025-05-19 RX ORDER — SACUBITRIL AND VALSARTAN 24; 26 MG/1; MG/1
1 TABLET, FILM COATED ORAL 2 TIMES DAILY
Qty: 60 TABLET | Refills: 0 | Status: SHIPPED | OUTPATIENT
Start: 2025-05-19

## 2025-05-22 NOTE — Clinical Note
5FR LAUNCHER INSERTED
7 FR SHEATH INSERTED.
7 FR SHEATH REMOVED
7FR SHEATH INSERTED; WIRE AND DILATOR REMOVED INTACT
7FR SHEATH REMOVED
9FR SHEATH INSERTED; WIRE AND DILATOR REMOVED INTACT
ALECIA IN THE POCKET
Accessed site: left axillary vein. FIRST ACCESS WITH WIRE
Accessed site: left axillary vein. SECOND ACCESS WITH WIRE
CHIDI HUGGER AND SAFETY WRIST RESTRAINTS ON
DEVICE CONNECTED
Defib pad site: left lower flank and right upper scapula.Defib pad site assessment: skin integrity intact.
Defib pad site: left lower flank and right upper scapula.Defib pad site assessment: skin integrity intact.
Generator pocket made.
Inserted under fluoro. 
Irrigated with antibiotic solution
LEAD REMOVED
LEFT CHEST WALL
Lead advanced under fluoro to the right atrium.
Lead advanced under fluoro to the right ventricle.
Left arm sling applied.
Positioned the atrial lead.
Positioned the right ventricular lead.
Positioned the right ventricular lead.
RV LEAD REPOSITIONED
Screw extended under fluoro.
Screw extended under fluoro.
Site (pad location): lateral thigh. Laterality: right.
Site (pad location): lateral thigh. Laterality: right. Grounding pad site assessment: skin integrity intact.
Sutured the atrial lead.
Sutured the generator.
Sutured the skin.
Sutured the subcutaneous tissue.
Tested the atrial lead.
Tested the right ventricular lead.
The ICD COBALT XT DR MRI IS1 DF4 - [id number] device was inserted. The leads were placed into the connector and visually verified to be in correct position. Injury current obtained.
WIRE AND 5FR LAUNCHER REMOVED INTACT
[FreeTextEntry1] : Dear Dr. SEEMA GIPSON  I had the pleasure of evaluating your patient NARENDRA OLMOS, thank you for allowing us to participate in their care. please see full note detailing our visit below. If you have any questions, please do not hesitate to call me and I would be happy to discuss further.   Dyllan Ricketts M.D. Attending Physician,   Department of Otolaryngology - Head and Neck Surgery [facility]  Office: [phone] Fax: [phone]

## 2025-05-28 DIAGNOSIS — I10 PRIMARY HYPERTENSION: ICD-10-CM

## 2025-05-28 DIAGNOSIS — I42.8 NONISCHEMIC CARDIOMYOPATHY (HCC): ICD-10-CM

## 2025-05-28 RX ORDER — METOPROLOL SUCCINATE 50 MG/1
50 TABLET, EXTENDED RELEASE ORAL 2 TIMES DAILY
Qty: 180 TABLET | Refills: 1 | Status: SHIPPED | OUTPATIENT
Start: 2025-05-28

## 2025-05-30 DIAGNOSIS — K21.00 GASTROESOPHAGEAL REFLUX DISEASE WITH ESOPHAGITIS WITHOUT HEMORRHAGE: ICD-10-CM

## 2025-05-30 RX ORDER — PANTOPRAZOLE SODIUM 40 MG/1
40 TABLET, DELAYED RELEASE ORAL DAILY
Qty: 90 TABLET | Refills: 1 | Status: SHIPPED | OUTPATIENT
Start: 2025-05-30

## 2025-06-20 DIAGNOSIS — I42.8 NONISCHEMIC CARDIOMYOPATHY (HCC): ICD-10-CM

## 2025-06-20 DIAGNOSIS — I10 PRIMARY HYPERTENSION: ICD-10-CM

## 2025-06-20 RX ORDER — SACUBITRIL AND VALSARTAN 24; 26 MG/1; MG/1
1 TABLET, FILM COATED ORAL 2 TIMES DAILY
Qty: 60 TABLET | Refills: 0 | Status: SHIPPED | OUTPATIENT
Start: 2025-06-20

## 2025-07-18 DIAGNOSIS — I10 PRIMARY HYPERTENSION: ICD-10-CM

## 2025-07-18 DIAGNOSIS — I42.8 NONISCHEMIC CARDIOMYOPATHY (HCC): ICD-10-CM

## 2025-07-18 RX ORDER — SACUBITRIL AND VALSARTAN 24; 26 MG/1; MG/1
1 TABLET, FILM COATED ORAL 2 TIMES DAILY
Qty: 60 TABLET | Refills: 0 | Status: SHIPPED | OUTPATIENT
Start: 2025-07-18

## 2025-07-25 ENCOUNTER — REMOTE DEVICE CLINIC VISIT (OUTPATIENT)
Dept: CARDIOLOGY CLINIC | Facility: CLINIC | Age: 80
End: 2025-07-25
Payer: COMMERCIAL

## 2025-07-25 DIAGNOSIS — Z95.810 PRESENCE OF AUTOMATIC CARDIOVERTER/DEFIBRILLATOR (AICD): Primary | ICD-10-CM

## 2025-07-25 PROCEDURE — 93295 DEV INTERROG REMOTE 1/2/MLT: CPT | Performed by: INTERNAL MEDICINE

## 2025-07-25 PROCEDURE — 93296 REM INTERROG EVL PM/IDS: CPT | Performed by: INTERNAL MEDICINE

## 2025-07-25 NOTE — PROGRESS NOTES
MDT DUAL ICD/ ACTIVE SYSTEM IS MRI CONDITIONAL   CARELINK TRANSMISSION: BATTERY VOLTAGE ADEQUATE (11.2 YR). AP 60.7%.  <0.1%. ALL LEAD PARAMETERS WITHIN NORMAL LIMITS. NO SIGNIFICANT HIGH RATE EPISODES. SINGLE PVC BURDEN 5.3%, 5.0 PVC RUNS/H. OPTI-VOL WITHIN NORMAL LIMITS. NORMAL DEVICE FUNCTION. [facility]

## (undated) DEVICE — DGW .035 FC J3MM 260CM TEF: Brand: EMERALD

## (undated) DEVICE — GLIDESHEATH SLENDER STAINLESS STEEL KIT: Brand: GLIDESHEATH SLENDER

## (undated) DEVICE — RADIFOCUS GLIDEWIRE: Brand: GLIDEWIRE

## (undated) DEVICE — TR BAND RADIAL ARTERY COMPRESSION DEVICE: Brand: TR BAND

## (undated) DEVICE — AVITENE MICROFIBRILLAR COLLAGEN HEMOSTAT NON-WOVEN WEB: Brand: AVITENE NON-WOVEN WEB

## (undated) DEVICE — INTRO SHEATH PEEL AWAY 9 FR

## (undated) DEVICE — RADIFOCUS OPTITORQUE ANGIOGRAPHIC CATHETER: Brand: OPTITORQUE

## (undated) DEVICE — GUIDEWIRE WHOLEY HI TORQUE INTERM MOD J .035 145CM

## (undated) DEVICE — CATH GUIDE LAUNCHER 5FR JR 4.0

## (undated) DEVICE — INTRO SHEATH PEEL AWAY 7FR